# Patient Record
Sex: MALE | Race: BLACK OR AFRICAN AMERICAN | NOT HISPANIC OR LATINO | Employment: OTHER | URBAN - METROPOLITAN AREA
[De-identification: names, ages, dates, MRNs, and addresses within clinical notes are randomized per-mention and may not be internally consistent; named-entity substitution may affect disease eponyms.]

---

## 2017-05-01 ENCOUNTER — OFFICE VISIT (OUTPATIENT)
Dept: NEPHROLOGY | Facility: CLINIC | Age: 42
End: 2017-05-01
Payer: COMMERCIAL

## 2017-05-01 VITALS
HEART RATE: 88 BPM | WEIGHT: 280 LBS | HEIGHT: 74 IN | OXYGEN SATURATION: 98 % | BODY MASS INDEX: 35.94 KG/M2 | SYSTOLIC BLOOD PRESSURE: 150 MMHG | DIASTOLIC BLOOD PRESSURE: 90 MMHG

## 2017-05-01 DIAGNOSIS — I10 ACCELERATED HYPERTENSION: Primary | ICD-10-CM

## 2017-05-01 DIAGNOSIS — E66.01 MORBID OBESITY DUE TO EXCESS CALORIES: ICD-10-CM

## 2017-05-01 PROCEDURE — 3077F SYST BP >= 140 MM HG: CPT | Mod: S$GLB,,, | Performed by: INTERNAL MEDICINE

## 2017-05-01 PROCEDURE — 1160F RVW MEDS BY RX/DR IN RCRD: CPT | Mod: S$GLB,,, | Performed by: INTERNAL MEDICINE

## 2017-05-01 PROCEDURE — 99999 PR PBB SHADOW E&M-EST. PATIENT-LVL III: CPT | Mod: PBBFAC,,, | Performed by: INTERNAL MEDICINE

## 2017-05-01 PROCEDURE — 99204 OFFICE O/P NEW MOD 45 MIN: CPT | Mod: S$GLB,,, | Performed by: INTERNAL MEDICINE

## 2017-05-01 PROCEDURE — 3080F DIAST BP >= 90 MM HG: CPT | Mod: S$GLB,,, | Performed by: INTERNAL MEDICINE

## 2017-05-01 NOTE — Clinical Note
Dinh let kanu know that I will try to enroll     Him in the [facility] Digital BP Monitoring Program.  I would like him to sen to me 2 weeks of BP readings every 6 weeks at least until he is established in this program.

## 2017-05-01 NOTE — LETTER
May 3, 2017      Vero Mercer, NP  [address] [facility] [address]           [facility] - Nephrology  [address]  [place] [address]  Phone: [phone]  Fax: [phone]          Patient: Nicolas Rock   MR Number: [id number]   YOB: 1975   Date of Visit: 5/1/2017       Dear Veor Mercer:    Thank you for referring Nicolas Rock to me for evaluation. Attached you will find relevant portions of my assessment and plan of care.    If you have questions, please do not hesitate to call me. I look forward to following Nicolas Rock along with you.    Sincerely,    Yvette Hyde MD    Enclosure  CC:  No Recipients    If you would like to receive this communication electronically, please contact [email or web address] or [phone] to request more information on [employer]mo Link access.    For providers and/or their staff who would like to refer a patient to [facility], please contact us through our one-stop-shop provider referral line, [facility] , at [phone].    If you feel you have received this communication in error or would no longer like to receive these types of communications, please e-mail [email or web address]

## 2017-05-01 NOTE — PROGRESS NOTES
Subjective:       Patient ID: Nicolas Rock is a 41 y.o. Black or  male who presents for new evaluation of renal function.  HPI  40 yo AAM with morbid obesity IDDM, uncontrolled HTN, serum crt 0.9 and no proteinuria, LVEF 55 with mild diastolic dysfunction and mild MR, here  For evaluation of HTN.  Bp meds include:  on amlodipine,  losartan, labetolol, clonidine, hydralazine and spironolactone.  Renin, aldosterone wnl  2016.     11/2016 renal US: Note that the examination was technically difficult. The kidneys are normal in overall size with the right kidney measuring 12.0 x 6.2 x 5.3 cm in size and the left kidney measuring 12.4 x 6.4 x 5.7 cm. There is no evidence for abnormal renal masses or hydronephrosis. No renal calculi are identified. The urinary bladder appears unremarkable.  The Doppler portion of the examination is markedly suboptimal.     Since hospitalization he has been on more medication and feels that his bp is better controlled.  At home bp 120/80 usally but may drop to 90/60. NO use of NSAIDs,  LUTS, dysuria, SOB, CP.    Review of Systems   Constitutional: Negative for activity change, appetite change, chills, diaphoresis, fatigue, fever and unexpected weight change.   HENT: Negative for congestion, ear discharge, ear pain, facial swelling, hearing loss, nosebleeds, sinus pressure, sore throat and trouble swallowing.    Eyes: Negative for photophobia, pain, discharge, redness, itching and visual disturbance.   Respiratory: Negative for apnea, cough, chest tightness, shortness of breath and wheezing.    Cardiovascular: Negative for chest pain, palpitations and leg swelling.   Gastrointestinal: Negative for abdominal distention, abdominal pain, constipation, diarrhea and vomiting.   Endocrine: Negative for cold intolerance, heat intolerance, polydipsia and polyuria.   Genitourinary: Negative for decreased urine volume, difficulty urinating, dysuria, flank pain, frequency,  "hematuria, scrotal swelling, testicular pain and urgency.   Musculoskeletal: Negative for arthralgias, back pain, gait problem, joint swelling, myalgias, neck pain and neck stiffness.   Skin: Negative for color change, pallor, rash and wound.   Allergic/Immunologic: Negative for environmental allergies and food allergies.   Neurological: Negative for dizziness, tremors, seizures, syncope, facial asymmetry, speech difficulty, weakness, light-headedness, numbness and headaches.   Hematological: Negative for adenopathy. Does not bruise/bleed easily.   Psychiatric/Behavioral: Negative for agitation, behavioral problems, dysphoric mood and sleep disturbance. The patient is not nervous/anxious.        Objective:     Blood pressure (!) 150/90, pulse 88, height 6' 2" (1.88 m), weight 127 kg (280 lb), SpO2 98 %.      Physical Exam   Constitutional: He is oriented to person, place, and time. He appears well-developed and well-nourished. No distress.   HENT:   Head: Normocephalic and atraumatic.   Mouth/Throat: Oropharynx is clear and moist. No oropharyngeal exudate.   Eyes: Conjunctivae and EOM are normal. Pupils are equal, round, and reactive to light. Right eye exhibits no discharge. Left eye exhibits no discharge. No scleral icterus.   Neck: Normal range of motion. Neck supple. No JVD present. No tracheal deviation present. No thyromegaly present.   Cardiovascular: Normal rate, regular rhythm and normal heart sounds.  Exam reveals no gallop and no friction rub.    No murmur heard.  Pulmonary/Chest: No stridor. No respiratory distress. He has no wheezes. He has no rales. He exhibits no tenderness.   Abdominal: Soft. Bowel sounds are normal. He exhibits no distension and no mass. There is no tenderness. There is no rebound and no guarding. No hernia.   Musculoskeletal: Normal range of motion. He exhibits no edema or tenderness.   Lymphadenopathy:     He has no cervical adenopathy.   Neurological: He is alert and oriented to " person, place, and time. No cranial nerve deficit. He exhibits normal muscle tone. Coordination normal.   Skin: Skin is warm and dry. No rash noted. He is not diaphoretic. No erythema. No pallor.   Psychiatric: He has a normal mood and affect. His behavior is normal. Judgment and thought content normal.   Nursing note and vitals reviewed.      Assessment:       1. Accelerated hypertension    2. Morbid obesity due to excess calories        Plan:     42 yo AAM with morbid obesity IDDM, uncontrolled HTN, serum crt 0.9 and no proteinuria, LVEF 55 with mild diastolic dysfunction and mild MR, here  With recent hospitalization for accelerated HTN.    Bp meds include:  on amlodipine,  losartan, labetolol, clonidine, hydralazine and spironolactone.  Renin, aldosterone wnl  2016.normal electrolytes and renal function on chemistries and  normal kidneys on US and now with compliancy and current medical regimen, bps that he claims are well controlled and even low at home.    No further workup at this time.  If Bps continue to be problematic with current regimen would then consider serum and urine catecholamines and metanephrines.  Patient will continue to monitor his bp; instructions on how to monitor provided.  Patient may do well in outpatient BP monitoring program offered through cardiology: Digital BP Monitoring Program with pharmacy surveillance.    No change in meds for now with h/o low bps at home        At this time would continue to monitor

## 2017-05-01 NOTE — PATIENT INSTRUCTIONS
1. Labs:    6 months  2.  Medications:  No change    Add probiotic or fage yogurt  and high fiber diet or metamucil   speak wiith PCP about  Smoking cessation    5. BP:  Take BP and pulse  twice daily for one week, record              Bring results  to next visit.              Goal :   <130/85    6. Diet:        Sodium: < 2000 milligrams daily including all food and drink      (one teaspoon of table salt has 2300 milligrams of sodium)  Mediterranean diet     Vaccines: please check with your PCP and be sure to have an annual flu vaccine and keep up to date with your pneumococcal vaccine for pneumonia      Please avoid or minimize all NSAIDS (ibuprofen, motrin, aleve, indocin, naprosyn, BC powder, mobic, relafen, alleve, and any others) to minimize the risk to your kidneys    Please avoid Proton pump inhibitors unless specifically necessary and speak with your PCP about alternatives such as H2 blockers     Return to clinic:  6 months

## 2017-05-01 NOTE — MR AVS SNAPSHOT
[facility] - Nephrology  [address]  [facility] [address]  Phone: [phone]  Fax: [phone]                  Nicolas MENA Pranav   2017 10:30 AM   Office Visit    Description:  Male : 1975   Provider:  Yvette Hyde MD   Department:  [facility] - Nephrology           Diagnoses this Visit        Comments    Accelerated hypertension    -  Primary     Morbid obesity due to excess calories                To Do List           Future Appointments        Provider Department Dept Phone    5/10/2017 10:30 AM [facility] - Endocrinology [phone]      Goals (5 Years of Data)     None      Follow-Up and Disposition     Return in about 6 months (around 2017).      [facility]     [facility] On Call Nurse Care Line -  Assistance  Unless otherwise directed by your provider, please contact [facility], our nurse care line that is available for  assistance.     Registered nurses in the [facility] On Call Center provide: appointment scheduling, clinical advisement, health education, and other advisory services.  Call: [phone] (toll free)               Medications           Message regarding Medications     Verify the changes and/or additions to your medication regime listed below are the same as discussed with your clinician today.  If any of these changes or additions are incorrect, please notify your healthcare provider.             Verify that the below list of medications is an accurate representation of the medications you are currently taking.  If none reported, the list may be blank. If incorrect, please contact your healthcare provider. Carry this list with you in case of emergency.           Current Medications     amlodipine (NORVASC) 10 MG tablet Take 1 tablet (10 mg total) by mouth once daily.    aspirin 81 MG Chew Take 1 tablet (81 mg total) by mouth once daily.    cloNIDine (CATAPRES) 0.3 MG tablet Take 0.3 mg by mouth 3 (three) times daily.  "   econazole nitrate 1 % cream Daily.    hydrALAZINE (APRESOLINE) 100 MG tablet Take 1 tablet (100 mg total) by mouth every 8 (eight) hours.    ibuprofen (ADVIL,MOTRIN) 800 MG tablet every 4 (four) hours as needed.     insulin glargine (TOUJEO SOLOSTAR) 300 unit/mL (1.5 mL) InPn Inject 40 Units as directed every evening.    insulin regular (NOVOLIN R) 100 unit/mL Inj injection  3 times a day by injection route before meals. ( sliding scale)    labetalol (NORMODYNE) 300 MG tablet Take 1 tablet (300 mg total) by mouth every 12 (twelve) hours.    losartan (COZAAR) 100 MG tablet Take 1 tablet (100 mg total) by mouth once daily.    metformin (GLUCOPHAGE) 500 MG tablet 500 mg 2 (two) times daily with meals.     nicotine (NICODERM CQ) 21 mg/24 hr Place 1 patch onto the skin once daily.    nystatin-triamcinolone (MYCOLOG II) cream Apply topically 2 (two) times daily.    spironolactone (ALDACTONE) 25 MG tablet Take 1 tablet (25 mg total) by mouth once daily.           Clinical Reference Information           Your Vitals Were     BP Pulse Height Weight SpO2 BMI    150/90 88 6' 2" (1.88 m) 127 kg (280 lb) 98% 35.95 kg/m2      Blood Pressure          Most Recent Value    BP  (!)  150/90      Allergies as of 5/1/2017     Lisinopril      Immunizations Administered on Date of Encounter - 5/1/2017     None      [facility] Sign-Up     Activating your [facility] account is as easy as 1-2-3!     1) Visit [email or web address], select Sign Up Now, enter this activation code and your date of birth, then select Next.  [email or web address]  Expires: 5/28/2017 10:00 PM      2) Create a username and password to use when you visit [facility] in the future and select a security question in case you lose your password and select Next.    3) Enter your e-mail address and click Sign Up!    Additional Information  If you have questions, please e-mail [email or web address] or call [phone] to talk to our [facility] staff. Remember, [facility] is NOT to be " used for urgent needs. For medical emergencies, dial 911.         Instructions      1. Labs:    6 months  2.  Medications:  No change    Add probiotic or fage yogurt  and high fiber diet or metamucil   speak wiith PCP about  Smoking cessation    5. BP:  Take BP and pulse  twice daily for one week, record              Bring results  to next visit.              Goal :   <130/85    6. Diet:        Sodium: < 2000 milligrams daily including all food and drink      (one teaspoon of table salt has 2300 milligrams of sodium)  Mediterranean diet     Vaccines: please check with your PCP and be sure to have an annual flu vaccine and keep up to date with your pneumococcal vaccine for pneumonia      Please avoid or minimize all NSAIDS (ibuprofen, motrin, aleve, indocin, naprosyn, BC powder, mobic, relafen, alleve, and any others) to minimize the risk to your kidneys    Please avoid Proton pump inhibitors unless specifically necessary and speak with your PCP about alternatives such as H2 blockers     Return to clinic:  6 months       Smoking Cessation     If you would like to quit smoking:   You may be eligible for free services if you are a [place] resident and started smoking cigarettes before September 1, 1988.  Call the Smoking Cessation Trust ([facility]) toll free at [phone] or [phone].   Call 1-800-QUIT-NOW if you do not meet the above criteria.   Contact us via email: [email or web address]   View our website for more information: [email or web address]        Language Assistance Services     ATTENTION: Language assistance services are available, free of charge. Please call [phone].      ATENCIÓN: Si habla español, tiene a pitts disposición servicios gratuitos de asistencia lingüística. Llame al [phone].     CHÚ Ý: N?u b?n nói Ti?ng Vi?t, có các d?ch v? h? tr? ngôn ng? mi?n phí dành cho b?n. G?i s? [phone].         [facility] complies with applicable Federal civil rights  laws and does not discriminate on the basis of race, color, national origin, age, disability, or sex.

## 2017-05-01 NOTE — Clinical Note
Dr. Rueda, I see that you saw Mr. Rock inpatient last winter. The patient  was sent to me for HTN evaluation at this time. BP appears much better.  I think he would be a good candidate for the Digital BP Monitoring Program.  I am not sure how to initiate this evaluation.  Would you be able to direct me? Regards, Yvette Hyde

## 2017-05-02 ENCOUNTER — TELEPHONE (OUTPATIENT)
Dept: NEPHROLOGY | Facility: CLINIC | Age: 42
End: 2017-05-02

## 2017-10-30 ENCOUNTER — TELEPHONE (OUTPATIENT)
Dept: PAIN MEDICINE | Facility: CLINIC | Age: 42
End: 2017-10-30

## 2017-10-30 NOTE — TELEPHONE ENCOUNTER
"----- Message from Sammy Nuñez sent at 10/30/2017 11:01 AM CDT -----  Contact: self   "Please call me in reference to scheduling an appointment to see the pain management md .  I went to ER, and I was told that I have sciatic nerve pain.  I have not had any radiology report done.  "

## 2017-11-06 ENCOUNTER — TELEPHONE (OUTPATIENT)
Dept: NEUROSURGERY | Facility: CLINIC | Age: 42
End: 2017-11-06

## 2017-11-06 NOTE — TELEPHONE ENCOUNTER
"The pt stated he was not sure if Neurosurgery was the correct path he should be taking for SI pain. I inquired if the medication prescribe for him in the ED is working (mobic, robaxin, and gabapentin. The pt replied with "yes". He stated he will f/u with his PCP first and if he needs to call for another appointment he will do so.    "

## 2018-05-01 ENCOUNTER — HOSPITAL ENCOUNTER (EMERGENCY)
Facility: HOSPITAL | Age: 43
Discharge: HOME | End: 2018-05-01
Payer: COMMERCIAL

## 2018-05-01 VITALS
BODY MASS INDEX: 35.94 KG/M2 | TEMPERATURE: 98 F | RESPIRATION RATE: 16 BRPM | OXYGEN SATURATION: 99 % | SYSTOLIC BLOOD PRESSURE: 160 MMHG | HEIGHT: 74 IN | HEART RATE: 80 BPM | DIASTOLIC BLOOD PRESSURE: 80 MMHG | WEIGHT: 280 LBS

## 2018-05-01 DIAGNOSIS — L02.214 INGUINAL ABSCESS: Primary | ICD-10-CM

## 2018-05-01 PROCEDURE — 25000003 PHARM REV CODE 250: Performed by: NURSE PRACTITIONER

## 2018-05-01 PROCEDURE — 90471 IMMUNIZATION ADMIN: CPT | Performed by: NURSE PRACTITIONER

## 2018-05-01 PROCEDURE — 10061 I&D ABSCESS COMP/MULTIPLE: CPT

## 2018-05-01 PROCEDURE — 63600175 PHARM REV CODE 636 W HCPCS: Performed by: NURSE PRACTITIONER

## 2018-05-01 PROCEDURE — 99283 EMERGENCY DEPT VISIT LOW MDM: CPT | Mod: 25

## 2018-05-01 PROCEDURE — 90715 TDAP VACCINE 7 YRS/> IM: CPT | Performed by: NURSE PRACTITIONER

## 2018-05-01 RX ORDER — SULFAMETHOXAZOLE AND TRIMETHOPRIM 800; 160 MG/1; MG/1
2 TABLET ORAL 2 TIMES DAILY
Qty: 40 TABLET | Refills: 0 | Status: SHIPPED | OUTPATIENT
Start: 2018-05-01 | End: 2018-05-11

## 2018-05-01 RX ORDER — LIDOCAINE HYDROCHLORIDE 10 MG/ML
5 INJECTION INFILTRATION; PERINEURAL
Status: COMPLETED | OUTPATIENT
Start: 2018-05-01 | End: 2018-05-01

## 2018-05-01 RX ORDER — HYDROCODONE BITARTRATE AND ACETAMINOPHEN 7.5; 325 MG/1; MG/1
1 TABLET ORAL EVERY 6 HOURS PRN
Qty: 13 TABLET | Refills: 0 | Status: SHIPPED | OUTPATIENT
Start: 2018-05-01 | End: 2019-05-03

## 2018-05-01 RX ADMIN — CLOSTRIDIUM TETANI TOXOID ANTIGEN (FORMALDEHYDE INACTIVATED), CORYNEBACTERIUM DIPHTHERIAE TOXOID ANTIGEN (FORMALDEHYDE INACTIVATED), BORDETELLA PERTUSSIS TOXOID ANTIGEN (GLUTARALDEHYDE INACTIVATED), BORDETELLA PERTUSSIS FILAMENTOUS HEMAGGLUTININ ANTIGEN (FORMALDEHYDE INACTIVATED), BORDETELLA PERTUSSIS PERTACTIN ANTIGEN, AND BORDETELLA PERTUSSIS FIMBRIAE 2/3 ANTIGEN 0.5 ML: 5; 2; 2.5; 5; 3; 5 INJECTION, SUSPENSION INTRAMUSCULAR at 05:05

## 2018-05-01 RX ADMIN — LIDOCAINE HYDROCHLORIDE 5 ML: 10 INJECTION, SOLUTION INFILTRATION; PERINEURAL at 04:05

## 2018-05-01 NOTE — ED PROVIDER NOTES
New Prescriptions    HYDROCODONE-ACETAMINOPHEN 7.5-325MG (NORCO) 7.5-325 MG PER TABLET    Take 1 tablet by mouth every 6 (six) hours as needed for Pain.    SULFAMETHOXAZOLE-TRIMETHOPRIM 800-160MG (BACTRIM DS) 800-160 MG TAB    Take 2 tablets by mouth 2 (two) times daily.    eMERGENCY dEPARTMENT eNCOUnter    CHIEF COMPLAINT    Chief Complaint   Patient presents with    Abscess     I have abscess in my groin on the right for 7 days and it isn't open.        HPI    Nicolas Rock is a 42 y.o. male who presents to the ED with an abscess to the right groin. States started 7 days ago. Started as a small hard area and now has gotten bigger and is red and feels softer, like something is in it. He denies fever, vomiting, diarrhea. He denies any other lesions. He states he has had abscess before and had it cut open.       CURRENT MEDICATIONS    No current facility-administered medications on file prior to encounter.      Current Outpatient Prescriptions on File Prior to Encounter   Medication Sig Dispense Refill    amlodipine (NORVASC) 10 MG tablet Take 1 tablet (10 mg total) by mouth once daily. 30 tablet 1    cloNIDine (CATAPRES) 0.3 MG tablet Take 0.3 mg by mouth 3 (three) times daily.  0    gabapentin (NEURONTIN) 300 MG capsule Take 1 capsule (300 mg total) by mouth 3 (three) times daily. 60 capsule 0    hydrALAZINE (APRESOLINE) 100 MG tablet Take 1 tablet (100 mg total) by mouth every 8 (eight) hours. 90 tablet 0    insulin glargine (TOUJEO SOLOSTAR) 300 unit/mL (1.5 mL) InPn Inject 40 Units as directed every evening.      insulin regular (NOVOLIN R) 100 unit/mL Inj injection  3 times a day by injection route before meals. ( sliding scale)      labetalol (NORMODYNE) 300 MG tablet Take 1 tablet (300 mg total) by mouth every 12 (twelve) hours. 60 tablet 0    losartan (COZAAR) 100 MG tablet Take 1 tablet (100 mg total) by mouth once daily. 30 tablet 0    meloxicam (MOBIC) 15 MG tablet Take 1 tablet (15 mg  total) by mouth once daily. 14 tablet 0    metformin (GLUCOPHAGE) 500 MG tablet 500 mg 2 (two) times daily with meals.       econazole nitrate 1 % cream Daily.      ibuprofen (ADVIL,MOTRIN) 800 MG tablet every 4 (four) hours as needed.       nicotine (NICODERM CQ) 21 mg/24 hr Place 1 patch onto the skin once daily.  0    nystatin-triamcinolone (MYCOLOG II) cream Apply topically 2 (two) times daily. 30 g 1    [DISCONTINUED] aspirin 81 MG Chew Take 1 tablet (81 mg total) by mouth once daily.  0    [DISCONTINUED] spironolactone (ALDACTONE) 25 MG tablet Take 1 tablet (25 mg total) by mouth once daily. 30 tablet 0         ALLERGIES    Review of patient's allergies indicates:   Allergen Reactions    Lisinopril Hives       PAST MEDICAL HISTORY  Past Medical History:   Diagnosis Date    Chronic diastolic heart failure     Diabetes mellitus     HLD (hyperlipidemia)     Hypertension        SURGICAL HISTORY    Past Surgical History:   Procedure Laterality Date    COLONOSCOPY Left 6/28/2016    Procedure: COLONOSCOPY;  Surgeon: ONEYDA Mckeon MD;  Location: [facility];  Service: Endoscopy;  Laterality: Left;  prostate exam         SOCIAL HISTORY    Social History     Social History    Marital status:      Spouse name: N/A    Number of children: N/A    Years of education: N/A     Social History Main Topics    Smoking status: Current Some Day Smoker     Packs/day: 0.75     Years: 16.00     Types: Cigarettes    Smokeless tobacco: Never Used      Comment:  Currently, chews Nicorette gum.    Alcohol use No    Drug use: No    Sexual activity: Yes     Partners: Female     Other Topics Concern    None     Social History Narrative    None       FAMILY HISTORY    Family History   Problem Relation Age of Onset    Hypertension Mother     Diabetes Mother     Kidney disease Mother     Cancer Maternal Uncle     Heart attack Maternal Grandfather        REVIEW OF SYSTEMS   ROS  Constitutional:  No fever,  "chills, weight loss or weakness.   Eyes:  No  Photophobia, blurred vision or discharge.   HENT:  No ear pain, nasal congestion or sore throat..  Respiratory:  No cough, shortness of breath or wheezing.   Cardiovascular:  No chest pain, palpitations or swelling.   GI:  No abdominal pain, nausea, vomiting, or diarrhea.  : No dysuria, frequency   Musculoskeletal:  No back pain or neck pain.   Skin:  No reported rashes or infected lesions.   Neurologic:  No reported headache, focal weakness or sensory changes.   Endocrine:    Lymphatic:  No reported swollen glands.   Psychiatric:  No reported depression, suicidal ideation or homicidal ideation  All Systems otherwise negative except as noted in the History of Present Illness.        PHYSICAL EXAM    Reviewed Triage Note  VITAL SIGNS: BP (!) 163/89   Pulse 83   Temp 97.9 °F (36.6 °C) (Oral)   Resp 16   Ht 6' 2" (1.88 m)   Wt 127 kg (280 lb)   SpO2 98%   BMI 35.95 kg/m²    Vitals:    05/01/18 1556   BP: (!) 163/89   Pulse: 83   Resp: 16   Temp: 97.9 °F (36.6 °C)       Physical Exam  Nursing Notes and Vital Signs Reviewed  Constitutional:  Well-developed, well-nourished, middle aged male in NAD.  HENT:  Normocephalic, atraumatic. Bilateral external EACs normal. Nose normal, no rhinorrhea. Mouth mucus membranes P & M.   Eyes:  PERRL EOMI. Conjunctiva normal without discharge.   Neck: Normal range of motion. No midline tenderness or vertebral step-off. No stridor. No meningismus. No lymphadenopathy.   Respiratory:  Normal breath sounds bilaterally.  No respiratory distress, retractions, or conversational dyspnea. No wheezing. No rhonchi. No rales.   Cardiovascular:  Normal heart rate. Normal rhythm. No pitting lower extremity edema.   Integument:  Right inguinal abscess with erythema andedema above right testicle. No drainage.   Neurologic:   Alert and Interactive. MAEW. Gait steady.   Psychiatric:  Affect normal. Mood normal.         LABS  Pertinent labs reviewed. " "(See chart for details)           RADIOLOGY    Imaging Results    None         PROCEDURES    I & D - Incision and Drainage  Date/Time: 5/1/2018 5:30 PM  Location procedure was performed: HISTORICAL DEPT - [facility]  Performed by: GUILLERMO DOWD  Authorized by: GUILLERMO DOWD   Assisting provider: ROHAN VILLANUEVA  Pre-operative diagnosis: Inguinal Abscess  Post-operative diagnosis: Inguinal Abscess  Consent Done: Yes  Consent: Verbal consent obtained.  Risks and benefits: risks, benefits and alternatives were discussed  Consent given by: patient  Patient understanding: patient states understanding of the procedure being performed  Patient consent: the patient's understanding of the procedure matches consent given  Procedure consent: procedure consent matches procedure scheduled  Relevant documents: relevant documents present and verified  Site marked: the operative site was marked  Patient identity confirmed: name and verbally with patient  Time out: Immediately prior to procedure a "time out" was called to verify the correct patient, procedure, equipment, support staff and site/side marked as required.  Type: abscess  Body area: trunk  Location details: abdomen    Anesthesia:  Local Anesthetic: lidocaine 1% without epinephrine  Anesthetic total: 5 mL  Patient sedated: no  Risk factor: underlying major vessel  Scalpel size: 11  Incision type: single straight  Complexity: complex  Drainage: pus  Drainage amount: moderate  Wound treatment: deloculation  Packing material: 1/4 in gauze  Technical procedures used: Incision and drainage with packing  Significant surgical tasks conducted by the assistant(s): none  Complications: No  Estimated blood loss (mL): 3  Implants: No  Patient tolerance: Patient tolerated the procedure well with no immediate complications            EKG         ED COURSE & MEDICAL DECISION MAKING    Pertinent & Imaging studies reviewed. (See chart for details and " specific orders.)  I & D with packing. Rx for Bactrim double dose. Patient has had these multiple sites in the past. Advised to return to ED in 2 days for recheck. Sooner if concerns/worsening.    Medications   Tdap vaccine injection 0.5 mL (0.5 mLs Intramuscular Given 5/1/18 1726)   lidocaine HCL 10 mg/ml (1%) injection 5 mL (5 mLs Infiltration Given by Other 5/1/18 1645)           FINAL IMPRESSION    1. Inguinal abscess        Differential Diagnosis: Inguinal Hernia                                       Scrotal/Testicular Mass    Patient advised to follow-up with PCP for re-check and BP re-check.                   Jeimy Pimentel, ADAM  05/01/18 2231

## 2018-07-18 DIAGNOSIS — E11.69 TYPE 2 DIABETES MELLITUS WITH OTHER SPECIFIED COMPLICATION: Primary | ICD-10-CM

## 2018-08-07 ENCOUNTER — HOSPITAL ENCOUNTER (OUTPATIENT)
Dept: RADIOLOGY | Facility: HOSPITAL | Age: 43
Discharge: HOME | End: 2018-08-07
Attending: PODIATRIST
Payer: COMMERCIAL

## 2018-08-07 DIAGNOSIS — E11.69 TYPE 2 DIABETES MELLITUS WITH OTHER SPECIFIED COMPLICATION: ICD-10-CM

## 2018-08-07 PROCEDURE — 73630 X-RAY EXAM OF FOOT: CPT | Mod: 50,TC,FY,PO

## 2019-05-30 ENCOUNTER — PATIENT MESSAGE (OUTPATIENT)
Dept: ADMINISTRATIVE | Facility: OTHER | Age: 44
End: 2019-05-30

## 2019-08-14 ENCOUNTER — HOSPITAL ENCOUNTER (OUTPATIENT)
Dept: RADIOLOGY | Facility: HOSPITAL | Age: 44
Discharge: HOME | End: 2019-08-14
Attending: ANESTHESIOLOGY
Payer: COMMERCIAL

## 2019-08-14 DIAGNOSIS — G89.29 CHRONIC LOW BACK PAIN: ICD-10-CM

## 2019-08-14 DIAGNOSIS — M54.50 CHRONIC LOW BACK PAIN: Primary | ICD-10-CM

## 2019-08-14 DIAGNOSIS — G89.29 CHRONIC LOW BACK PAIN: Primary | ICD-10-CM

## 2019-08-14 DIAGNOSIS — M54.50 CHRONIC LOW BACK PAIN: ICD-10-CM

## 2019-08-14 PROCEDURE — 72110 X-RAY EXAM L-2 SPINE 4/>VWS: CPT | Mod: TC,FY,PO

## 2019-08-21 ENCOUNTER — HOSPITAL ENCOUNTER (OUTPATIENT)
Dept: RADIOLOGY | Facility: HOSPITAL | Age: 44
Discharge: HOME | End: 2019-08-21
Attending: ANESTHESIOLOGY
Payer: COMMERCIAL

## 2019-08-21 DIAGNOSIS — G89.29 CHRONIC LOW BACK PAIN: ICD-10-CM

## 2019-08-21 DIAGNOSIS — M54.50 CHRONIC LOW BACK PAIN: ICD-10-CM

## 2019-08-21 PROCEDURE — 72148 MRI LUMBAR SPINE W/O DYE: CPT | Mod: TC,PO

## 2020-01-31 ENCOUNTER — HOSPITAL ENCOUNTER (EMERGENCY)
Facility: HOSPITAL | Age: 45
Discharge: HOME | End: 2020-01-31
Attending: EMERGENCY MEDICINE
Payer: COMMERCIAL

## 2020-01-31 VITALS
DIASTOLIC BLOOD PRESSURE: 91 MMHG | OXYGEN SATURATION: 97 % | HEIGHT: 74 IN | HEART RATE: 97 BPM | TEMPERATURE: 98 F | WEIGHT: 255 LBS | SYSTOLIC BLOOD PRESSURE: 176 MMHG | RESPIRATION RATE: 16 BRPM | BODY MASS INDEX: 32.73 KG/M2

## 2020-01-31 DIAGNOSIS — I10 HYPERTENSION, UNSPECIFIED TYPE: ICD-10-CM

## 2020-01-31 DIAGNOSIS — L98.8 SKIN MACERATION: Primary | ICD-10-CM

## 2020-01-31 PROCEDURE — S0077 INJECTION, CLINDAMYCIN PHOSP: HCPCS | Mod: ER | Performed by: EMERGENCY MEDICINE

## 2020-01-31 PROCEDURE — 96372 THER/PROPH/DIAG INJ SC/IM: CPT | Mod: ER

## 2020-01-31 PROCEDURE — 99284 EMERGENCY DEPT VISIT MOD MDM: CPT | Mod: 25,ER

## 2020-01-31 PROCEDURE — 25000003 PHARM REV CODE 250: Mod: ER | Performed by: EMERGENCY MEDICINE

## 2020-01-31 RX ORDER — CLINDAMYCIN PHOSPHATE 150 MG/ML
600 INJECTION, SOLUTION INTRAVENOUS
Status: COMPLETED | OUTPATIENT
Start: 2020-01-31 | End: 2020-01-31

## 2020-01-31 RX ORDER — CLINDAMYCIN HYDROCHLORIDE 150 MG/1
300 CAPSULE ORAL 4 TIMES DAILY
Qty: 56 CAPSULE | Refills: 0 | Status: SHIPPED | OUTPATIENT
Start: 2020-01-31 | End: 2020-02-07

## 2020-01-31 RX ORDER — CLONIDINE HYDROCHLORIDE 0.1 MG/1
0.1 TABLET ORAL
Status: COMPLETED | OUTPATIENT
Start: 2020-01-31 | End: 2020-01-31

## 2020-01-31 RX ORDER — CLINDAMYCIN HYDROCHLORIDE 150 MG/1
300 CAPSULE ORAL 4 TIMES DAILY
Qty: 56 CAPSULE | Refills: 0 | Status: SHIPPED | OUTPATIENT
Start: 2020-01-31 | End: 2020-01-31 | Stop reason: SDUPTHER

## 2020-01-31 RX ADMIN — CLONIDINE HYDROCHLORIDE 0.1 MG: 0.1 TABLET ORAL at 09:01

## 2020-01-31 RX ADMIN — CLINDAMYCIN PHOSPHATE 600 MG: 150 INJECTION, SOLUTION INTRAMUSCULAR; INTRAVENOUS at 09:01

## 2020-02-01 NOTE — DISCHARGE INSTRUCTIONS
Keep the area clean and dry.  Your xray does not show bone involvement. Follow-up with your podiatrist in 2 days for wound recheck.  Watch for signs of infection including any worsening redness, pus, warmth, or fever.  If you notice these things please follow up with your primary care physician immediately return to emergency department.  Take medications as prescribed.  Refer to the additional materials provided for further information including when to return to the emergency department.

## 2020-02-01 NOTE — ED TRIAGE NOTES
"Reports to ED c diabetic wound to L foot. States "I have been having this callus on my foot for quite some time now and I see a podiatrist for it. Recently its been moist. And when I work it got wet and since Wednesday it has been draining some white stuff and a little blood." Reports podiatrist sent pt here for possible blood work and abx regimen. No drainage noted. No fever present. Pt diabetic, last BS was 95 this AM  "

## 2020-02-01 NOTE — ED PROVIDER NOTES
Chief complaint:  Left foot wound     HPI    Nicolas Rock 44 y.o. presents to the emergency department today with a complaint of  a left foot long.  Patient reports that he has a callus on his left foot that he is seen regularly with Podiatry for.  Few days ago at work he was out working in the rain, his boot got wet, he had a lot of moisture within the boots.  He noticed that the callus had gotten very soft at that point.  It then progressed becoming open and draining some clear fluid.  Patient denies any warmth or pain at the site.  Denies any drainage of pus.  No fever, chills or sweats at home.    ROS    Constitutional: No fever, no chills.  Eyes: No discharge. No pain.  HENT: No nasal drainage. No ear ache. No sore throat.  Cardiovascular: No chest pain, no palpitations.  Respiratory: No cough, no shortness of breath.  Gastrointestinal: No abdominal pain, no vomiting. No diarrhea.  Genitourinary: No hematuria, dysuria, urgency.  Musculoskeletal: No back pain.   Skin: No rashes, no lesions.  Neurological: No headache, no focal weakness.    Otherwise remaining ROS negative     The history is provided by the patient      ALLERGIES REVIEWED  MEDICATIONS REVIEWED  PMH/PSH/SOC/FH REVIEWED       Past Medical History:   Diagnosis Date    Chronic diastolic heart failure     Diabetes mellitus     HLD (hyperlipidemia)     Hypertension          Past Surgical History:   Procedure Laterality Date    COLONOSCOPY Left 6/28/2016    Procedure: COLONOSCOPY;  Surgeon: ONEYDA Mckeon MD;  Location: [facility];  Service: Endoscopy;  Laterality: Left;  prostate exam           Social History     Tobacco Use    Smoking status: Current Some Day Smoker     Packs/day: 0.75     Years: 16.00     Pack years: 12.00     Types: Cigarettes    Smokeless tobacco: Never Used    Tobacco comment:  Currently, chews Nicorette gum.   Substance Use Topics    Alcohol use: No    Drug use: No       Family History   Problem Relation Age of  "Onset    Hypertension Mother     Diabetes Mother     Kidney disease Mother     Cancer Maternal Uncle     Heart attack Maternal Grandfather        Nursing/Ancillary staff note reviewed.  VS reviewed         Physical Exam     BP (S) (!) 200/95 Comment: pt reports "i forgot to take all blood pressure medicines"  Pulse 98   Temp 98 °F (36.7 °C) (Oral)   Resp 20   Ht 6' 2" (1.88 m)   Wt 115.7 kg (255 lb)   SpO2 96%   BMI 32.74 kg/m²     Physical Exam   Musculoskeletal:        Feet:     There is no warmth or erythema to this area.    General Appearance: The patient is alert, has no immediate need for airway protection and no signs of toxicity. No acute distress. Lying in bed but able to sit up without difficulty.   HEENT: Eyes: Pupils equal and round no pallor or injection. Extra ocular movements intact. No drainage.       Mouth: Mucous membranes are moist. Oropharynx clear.   Neck:Neck is supple non-tender. No lymphadenopathy. No stridor.   Respiratory: There are no retractions, lungs are clear to auscultation. No wheezing, no crackles. Chest wall nontender to palpation.   Cardiovascular: Regular rate and rhythm. No murmurs, rubs or gallops.  Gastrointestinal:  Abdomen is soft and non-tender, no masses, bowel sounds normal. No guarding, no rebound.  No pulsatile mass.   Neurological: Alert and oriented x 4. CN II-XII grossly intact. No focal weakness. Strength intact 5/5 bilaterally in upper and lower extremities.   Skin: Warm and dry, no rashes.   Musculoskeletal:Musculoskeletal: Extremities are non-tender, non-swollen and have full range of motion. Back NTTP along the midline.     DIFFERENTIAL DIAGNOSIS: After history and physical exam a differential diagnosis was considered, but was not limited to, callus, immersion injury, trench foot, cellulitis, abscess         Initial management:  This is a 44-year-old male who presents to the emergency department today with what appears to be an emergent injury. He " has what appears to be macerated area of skin secondary to moisture.  It does not have any purulent drainage at this time.  Will obtain x-ray to in order to evaluate for osteomyelitis.  Treat with antibiotics.          X-Ray Foot Complete Left   Final Result      No radiographic evidence of osteomyelitis.  No foreign body.         Electronically signed by: Cristóbal Ortiz MD   Date:    01/31/2020   Time:    21:05                        ED Course                 Medical Decision Making:   History:   I obtained history from: patient.  Old Medical Records: I decided to obtain old medical records.  Is seen regularly by Podiatry.      Radiological Study: Ordered and Reviewed      ED Management:  Nicolas Rock  presents to the emergency Department today with what appears to be an emergent injury on his left foot.  At this time there is no purulent drainage.  No sign of abscess.  No sign of osteomyelitis on x-ray.  Given the fact the patient has diabetes of place him on antibiotics and I will have him follow up with his podiatrist.  I have discussed with him the need for close follow-up and to monitor the wound for signs of worsening infection.  Patient understands.  He had an elevated blood pressure in the emergency department.  He will go home and take his medications.  He is comfortable with this plan and comfortable going home at this time. The pt is comfortable with this plan and comfortable going home at this time. After taking into careful account the historical factors and physical exam findings of the patient's presentation today, in conjunction with the empirical and objective data obtained on ED workup, no acute emergent medical condition requiring admission has been identified. The patient appears to be low risk for an emergent medical condition and I feel it is safe and appropriate at this time for the patient to be discharged to follow-up as detailed in their discharge instructions for reevaluation and possible  continued outpatient workup and management. Regardless, an unremarkable evaluation in the ED does not preclude the development or presence of a serious or life threatening condition. As such, patient was instructed to return immediately for any worsening or change in current symptoms. Precautions for return discussed at length.  Discharge and follow-up instructions discussed with the patient who expressed understanding and willingness to comply with my recommendations.    Voice recognition software utilized in this note.              Impression      The primary encounter diagnosis was Skin maceration. A diagnosis of Hypertension, unspecified type was also pertinent to this visit.                New Prescriptions    CLINDAMYCIN (CLEOCIN) 150 MG CAPSULE    Take 2 capsules (300 mg total) by mouth 4 (four) times daily. for 7 days                Devin Chávez MD  02/01/20 0313

## 2020-02-01 NOTE — ED NOTES
Pt is ambulatory to and from restroom c NADN or SOB noted. Aware of waiting 15 min to be cautious of any reaction

## 2020-02-06 ENCOUNTER — OFFICE VISIT (OUTPATIENT)
Dept: PODIATRY | Facility: CLINIC | Age: 45
End: 2020-02-06
Payer: COMMERCIAL

## 2020-02-06 VITALS
DIASTOLIC BLOOD PRESSURE: 84 MMHG | SYSTOLIC BLOOD PRESSURE: 120 MMHG | RESPIRATION RATE: 16 BRPM | WEIGHT: 253.81 LBS | HEIGHT: 74 IN | BODY MASS INDEX: 32.57 KG/M2 | HEART RATE: 78 BPM

## 2020-02-06 DIAGNOSIS — E11.621 DIABETIC ULCER OF OTHER PART OF LEFT FOOT ASSOCIATED WITH TYPE 2 DIABETES MELLITUS, WITH FAT LAYER EXPOSED: ICD-10-CM

## 2020-02-06 DIAGNOSIS — E11.49 TYPE II DIABETES MELLITUS WITH NEUROLOGICAL MANIFESTATIONS: ICD-10-CM

## 2020-02-06 DIAGNOSIS — F17.200 SMOKER: ICD-10-CM

## 2020-02-06 DIAGNOSIS — L97.522 DIABETIC ULCER OF OTHER PART OF LEFT FOOT ASSOCIATED WITH TYPE 2 DIABETES MELLITUS, WITH FAT LAYER EXPOSED: ICD-10-CM

## 2020-02-06 PROCEDURE — 3074F PR MOST RECENT SYSTOLIC BLOOD PRESSURE < 130 MM HG: ICD-10-PCS | Mod: CPTII,S$GLB,, | Performed by: PODIATRIST

## 2020-02-06 PROCEDURE — 3008F PR BODY MASS INDEX (BMI) DOCUMENTED: ICD-10-PCS | Mod: CPTII,S$GLB,, | Performed by: PODIATRIST

## 2020-02-06 PROCEDURE — 11042 WOUND DEBRIDEMENT: ICD-10-PCS | Mod: S$GLB,,, | Performed by: PODIATRIST

## 2020-02-06 PROCEDURE — 99204 OFFICE O/P NEW MOD 45 MIN: CPT | Mod: 25,S$GLB,, | Performed by: PODIATRIST

## 2020-02-06 PROCEDURE — 99204 PR OFFICE/OUTPT VISIT, NEW, LEVL IV, 45-59 MIN: ICD-10-PCS | Mod: 25,S$GLB,, | Performed by: PODIATRIST

## 2020-02-06 PROCEDURE — 3079F DIAST BP 80-89 MM HG: CPT | Mod: CPTII,S$GLB,, | Performed by: PODIATRIST

## 2020-02-06 PROCEDURE — 99999 PR PBB SHADOW E&M-EST. PATIENT-LVL IV: ICD-10-PCS | Mod: PBBFAC,,, | Performed by: PODIATRIST

## 2020-02-06 PROCEDURE — 11042 DBRDMT SUBQ TIS 1ST 20SQCM/<: CPT | Mod: S$GLB,,, | Performed by: PODIATRIST

## 2020-02-06 PROCEDURE — 99999 PR PBB SHADOW E&M-EST. PATIENT-LVL IV: CPT | Mod: PBBFAC,,, | Performed by: PODIATRIST

## 2020-02-06 PROCEDURE — 3074F SYST BP LT 130 MM HG: CPT | Mod: CPTII,S$GLB,, | Performed by: PODIATRIST

## 2020-02-06 PROCEDURE — 3008F BODY MASS INDEX DOCD: CPT | Mod: CPTII,S$GLB,, | Performed by: PODIATRIST

## 2020-02-06 PROCEDURE — 3079F PR MOST RECENT DIASTOLIC BLOOD PRESSURE 80-89 MM HG: ICD-10-PCS | Mod: CPTII,S$GLB,, | Performed by: PODIATRIST

## 2020-02-06 RX ORDER — OXYCODONE AND ACETAMINOPHEN 10; 325 MG/1; MG/1
1 TABLET ORAL 2 TIMES DAILY PRN
Status: ON HOLD | COMMUNITY
Start: 2020-01-30 | End: 2020-09-03

## 2020-02-06 RX ORDER — ATORVASTATIN CALCIUM 40 MG/1
TABLET, FILM COATED ORAL
COMMUNITY
End: 2021-01-01

## 2020-02-06 RX ORDER — GABAPENTIN 300 MG/1
300 CAPSULE ORAL 2 TIMES DAILY
COMMUNITY

## 2020-02-06 NOTE — LETTER
February 6, 2020      Eveline Hensley DPM  [address]  [place] [address]             [address]  [place] [address]  Phone: [phone]  Fax: [phone]          Patient: Nicolas Rock   MR Number: [id number]   YOB: 1975   Date of Visit: 2/6/2020       Dear Dr. Eveline Hensley:    Thank you for referring Nicolas Rock to me for evaluation. Attached you will find relevant portions of my assessment and plan of care.    If you have questions, please do not hesitate to call me. I look forward to following Nicolas Rock along with you.    Sincerely,    Lacie Ibanez DPM    Enclosure  CC:  No Recipients    If you would like to receive this communication electronically, please contact [email or web address] or [phone] to request more information on [facility] access.    For providers and/or their staff who would like to refer a patient to [facility], please contact us through our one-stop-shop provider referral line, [facility], at [phone].    If you feel you have received this communication in error or would no longer like to receive these types of communications, please e-mail [email or web address]

## 2020-02-06 NOTE — PROGRESS NOTES
Subjective:      Patient ID: Nicolas Rock is a 44 y.o. male.    Chief Complaint: Foot Ulcer (left foot. discharge); Callouses (right foot); and PCP visit (does not have a set primary)    44 y.o. male presenting with left foot diabetic ulcer.  Patient has been under care of Dr. Hensley.  Patient points sub met 2 of the left foot. Patient tells me has been formation of callus 4 years.  Callus for them developed ulcer sometime last week.  Ambulating in [place] shoe.  Last glucose check was 192.  He smokes half pack a day. Patient was sent to emergency room.  X-rays were taken which was negative for infection.  Patient was given 10 days of clindamycin.  Patient admits taking antibiotics.  Denies pain.  Complains of numbness and tingling.        Review of Systems   Constitution: Negative for chills, decreased appetite, fever and malaise/fatigue.   HENT: Negative for congestion, ear discharge and sore throat.    Eyes: Negative for discharge and pain.   Cardiovascular: Negative for chest pain, claudication and leg swelling.   Respiratory: Negative for cough and shortness of breath.    Skin: Positive for color change and poor wound healing. Negative for nail changes and rash.   Musculoskeletal: Positive for stiffness. Negative for arthritis, joint pain, joint swelling and muscle weakness.   Gastrointestinal: Negative for bloating, abdominal pain, diarrhea, nausea and vomiting.   Genitourinary: Negative for flank pain and hematuria.   Neurological: Positive for numbness and sensory change. Negative for headaches and weakness.   Psychiatric/Behavioral: Negative for altered mental status.             Past Medical History:   Diagnosis Date    Chronic diastolic heart failure     Diabetes mellitus     HLD (hyperlipidemia)     Hypertension        Past Surgical History:   Procedure Laterality Date    COLONOSCOPY Left 6/28/2016    Procedure: COLONOSCOPY;  Surgeon: ONEYDA Mckeon MD;  Location: [facility];  Service:  Endoscopy;  Laterality: Left;  prostate exam         Family History   Problem Relation Age of Onset    Hypertension Mother     Diabetes Mother     Kidney disease Mother     Cancer Maternal Uncle     Heart attack Maternal Grandfather        Social History     Socioeconomic History    Marital status:      Spouse name: Not on file    Number of children: Not on file    Years of education: Not on file    Highest education level: Not on file   Occupational History    Not on file   Social Needs    Financial resource strain: Not on file    Food insecurity:     Worry: Not on file     Inability: Not on file    Transportation needs:     Medical: Not on file     Non-medical: Not on file   Tobacco Use    Smoking status: Current Some Day Smoker     Packs/day: 0.75     Years: 16.00     Pack years: 12.00     Types: Cigarettes    Smokeless tobacco: Never Used    Tobacco comment:  Currently, chews Nicorette gum.   Substance and Sexual Activity    Alcohol use: No    Drug use: No    Sexual activity: Yes     Partners: Female   Lifestyle    Physical activity:     Days per week: Not on file     Minutes per session: Not on file    Stress: Not on file   Relationships    Social connections:     Talks on phone: Not on file     Gets together: Not on file     Attends Hindu service: Not on file     Active member of club or organization: Not on file     Attends meetings of clubs or organizations: Not on file     Relationship status: Not on file   Other Topics Concern    Not on file   Social History Narrative    Not on file       Current Outpatient Medications   Medication Sig Dispense Refill    atorvastatin (LIPITOR) 40 MG tablet atorvastatin 40 mg tablet      clindamycin (CLEOCIN) 150 MG capsule Take 2 capsules (300 mg total) by mouth 4 (four) times daily. for 7 days 56 capsule 0    cloNIDine (CATAPRES) 0.3 MG tablet Take 0.3 mg by mouth 3 (three) times daily.  0    gabapentin (NEURONTIN) 300 MG capsule  "gabapentin 300 mg capsule      hydrALAZINE (APRESOLINE) 100 MG tablet Take 1 tablet (100 mg total) by mouth every 8 (eight) hours. 90 tablet 0    ibuprofen (ADVIL,MOTRIN) 800 MG tablet every 4 (four) hours as needed.       insulin glargine (TOUJEO SOLOSTAR) 300 unit/mL (1.5 mL) InPn Inject 40 Units as directed every evening.      insulin regular (NOVOLIN R) 100 unit/mL Inj injection  3 times a day by injection route before meals. ( sliding scale)      oxyCODONE-acetaminophen (PERCOCET)  mg per tablet Take 1 tablet by mouth 2 (two) times daily as needed.      amlodipine (NORVASC) 10 MG tablet Take 1 tablet (10 mg total) by mouth once daily. 30 tablet 1    HYDROcodone-acetaminophen (NORCO) 5-325 mg per tablet Take 1 tablet by mouth every 4 (four) hours as needed for Pain. (Patient not taking: Reported on 2/6/2020) 10 tablet 0    labetalol (NORMODYNE) 300 MG tablet Take 1 tablet (300 mg total) by mouth every 12 (twelve) hours. 60 tablet 0    losartan (COZAAR) 100 MG tablet Take 1 tablet (100 mg total) by mouth once daily. 30 tablet 0    meloxicam (MOBIC) 15 MG tablet Take 1 tablet (15 mg total) by mouth once daily. 14 tablet 0     No current facility-administered medications for this visit.        Review of patient's allergies indicates:   Allergen Reactions    Lisinopril Hives       Vitals:    02/06/20 1438   BP: 120/84   Pulse: 78   Resp: 16   Weight: 115.1 kg (253 lb 12.8 oz)   Height: 6' 2" (1.88 m)   PainSc: 0-No pain       Objective:      Physical Exam   Constitutional: He is oriented to person, place, and time. He appears well-developed and well-nourished. No distress.   HENT:   Nose: Nose normal.   Eyes: Conjunctivae are normal.   Neck: Normal range of motion.   Pulmonary/Chest: Effort normal. He exhibits no tenderness.   Abdominal: There is no tenderness.   Neurological: He is alert and oriented to person, place, and time.   Psychiatric: He has a normal mood and affect. His behavior is normal. "       Vascular: Distal DP/PT pulses palpable 1/4. CRT < 3 sec to tips of toes. No vericosities noted to LEs. warm to touch LE, No edema noted to LE.    Dermatologic:   Left foot:  4 cm x 5 cm by 0.5 cm wound on the plantar aspect of the sub met 2 with blister formation filled with serous drainage with some epidermolysis.  Mild rubor, no erythema, no pus, no crepitus, mild malodor, interdigital maceration.  No probes to bone/no probe to deep tissue.     R foot:  Hyperkeratotic lesion distal tip of hallux, sub met 4.     Scaling dryness in a moccasin distribution is noted to the bilateral lower extremities with associated erythema.    Musculoskeletal: MMT 5/5 in DF/PF/Inv/Ev resistance with no reproduction of pain in any direction. Passive range of motion of ankle and pedal joints is painless. No calf tenderness LE, Compartments soft/compressible.    Neurological: Light touch, proprioception, and sharp/dull sensation are all intact. Protective threshold with the Bellwood-Wienstein monofilament is intact. Vibratory sensation intact.     2/6/20            Assessment:       Encounter Diagnoses   Name Primary?    IDDM (insulin dependent diabetes mellitus) Yes    Type II diabetes mellitus with neurological manifestations     Diabetic ulcer of other part of left foot associated with type 2 diabetes mellitus, with fat layer exposed     Smoker          Plan:       Nicolas was seen today for foot ulcer, callouses and pcp visit.    Diagnoses and all orders for this visit:    IDDM (insulin dependent diabetes mellitus)    Type II diabetes mellitus with neurological manifestations    Diabetic ulcer of other part of left foot associated with type 2 diabetes mellitus, with fat layer exposed    Smoker      I counseled the patient on his conditions, their implications and medical management.    44 y.o. male with left foot diabetic ulcer status post wound debridement.     -status post left foot wound debridement.  Verbal consent was  obtained.  See procedure note.  Patient tolerated well.  -Applied saline moist and Silva to wound site followed by Aquacel foam 2 layers of cast padding to the foot secured lightly with Coban.  -WBAt in sx shoe. Sx shoe dispensed.  -Shoe inspection. General Foot Education. Patient reminded of the importance of good nutrition. Patient instructed on proper foot hygeine. We discussed wearing proper shoe gear, daily foot inspections, never walking without protective shoe gear, caution putting sharp instruments to feet. Discussed general foot care:  Wear comfortable, proper fitting shoes. Wash feet daily. Dry well. After drying, apply moisturizer to feet (no lotion to webspaces). Inspect feet daily for skin breaks, blisters, swelling, or redness. Wear cotton socks (preferably white)  Change socks every day. Do NOT walk barefoot. Do NOT use heating pads or warm/hot water soaks   -It was discussed the importance of wearing shoes with adequate room in toe box to accommodate toe deformities. Recommended New Balance/Asics shoe brands with adequate arch supports to alleviate abnormal pressure and improve stability of foot while walking. Avoid flat shoes and barefoot walking as these will exacerbate or worsen symptoms.   -Discuss in detail the harmful effects of nicotine/tobacco/cigarette smoking, especially in relation to the lower extremity. Recommend consultation with primary care provider for further discussed of smoking cessation methods. Smoking & Tobacco use cessation couseling was rendered at today's visit; intermediate, bewteen 3 and 10 minutes.  -f/u 1 week.       Note dictated with voice recognition software, please excuse any grammatical errors.

## 2020-02-06 NOTE — PROCEDURES
"Wound Debridement  Date/Time: 2/6/2020 1:45 PM  Performed by: Lacie Ibanez DPM  Authorized by: Lacie Ibanez DPM     Time out: Immediately prior to procedure a "time out" was called to verify the correct patient, procedure, equipment, support staff and site/side marked as required.    Consent Done?:  Yes (Verbal)    Preparation: Patient was prepped and draped in usual sterile fashion    Local anesthesia used?: No      Wound Details:    Location:  Left foot    Location:  Left Plantar    Type of Debridement:  Excisional       Length (cm):  4       Area (sq cm):  20       Width (cm):  5       Percent Debrided (%):  1       Depth (cm):  100       Total Area Debrided (sq cm):  0.2    Depth of debridement:  Subcutaneous tissue    Tissue debrided:  Subcutaneous    Devitalized tissue debrided:  Biofilm and Callus    Instruments:  Curette and Blade    Bleeding:  Minimal  Hemostasis Achieved: Yes    Method Used:  Pressure  Patient tolerance:  Patient tolerated the procedure well with no immediate complications     Time-out was performed with the patient the room      "

## 2020-02-17 ENCOUNTER — TELEPHONE (OUTPATIENT)
Dept: PODIATRY | Facility: CLINIC | Age: 45
End: 2020-02-17

## 2020-02-17 NOTE — TELEPHONE ENCOUNTER
----- Message from Starr Phillips sent at 2/17/2020  3:47 PM CST -----  Contact: [phone]/self  Patient calling to inform you that he is running late for his appointment. About 15 - 20 minutes. Please advise.

## 2020-02-19 ENCOUNTER — PATIENT MESSAGE (OUTPATIENT)
Dept: PODIATRY | Facility: CLINIC | Age: 45
End: 2020-02-19

## 2020-02-19 ENCOUNTER — OFFICE VISIT (OUTPATIENT)
Dept: PODIATRY | Facility: CLINIC | Age: 45
End: 2020-02-19
Payer: COMMERCIAL

## 2020-02-19 VITALS
WEIGHT: 251.88 LBS | HEART RATE: 94 BPM | RESPIRATION RATE: 16 BRPM | DIASTOLIC BLOOD PRESSURE: 84 MMHG | BODY MASS INDEX: 32.33 KG/M2 | HEIGHT: 74 IN | SYSTOLIC BLOOD PRESSURE: 135 MMHG

## 2020-02-19 DIAGNOSIS — E11.621 DIABETIC ULCER OF OTHER PART OF LEFT FOOT ASSOCIATED WITH TYPE 2 DIABETES MELLITUS, WITH FAT LAYER EXPOSED: Primary | ICD-10-CM

## 2020-02-19 DIAGNOSIS — E11.49 TYPE II DIABETES MELLITUS WITH NEUROLOGICAL MANIFESTATIONS: ICD-10-CM

## 2020-02-19 DIAGNOSIS — L84 CORN OR CALLUS: ICD-10-CM

## 2020-02-19 DIAGNOSIS — L97.522 DIABETIC ULCER OF OTHER PART OF LEFT FOOT ASSOCIATED WITH TYPE 2 DIABETES MELLITUS, WITH FAT LAYER EXPOSED: Primary | ICD-10-CM

## 2020-02-19 PROCEDURE — 99999 PR PBB SHADOW E&M-EST. PATIENT-LVL IV: ICD-10-PCS | Mod: PBBFAC,,, | Performed by: PODIATRIST

## 2020-02-19 PROCEDURE — 99213 PR OFFICE/OUTPT VISIT, EST, LEVL III, 20-29 MIN: ICD-10-PCS | Mod: S$GLB,,, | Performed by: PODIATRIST

## 2020-02-19 PROCEDURE — 99213 OFFICE O/P EST LOW 20 MIN: CPT | Mod: S$GLB,,, | Performed by: PODIATRIST

## 2020-02-19 PROCEDURE — 3079F PR MOST RECENT DIASTOLIC BLOOD PRESSURE 80-89 MM HG: ICD-10-PCS | Mod: CPTII,S$GLB,, | Performed by: PODIATRIST

## 2020-02-19 PROCEDURE — 3008F BODY MASS INDEX DOCD: CPT | Mod: CPTII,S$GLB,, | Performed by: PODIATRIST

## 2020-02-19 PROCEDURE — 3079F DIAST BP 80-89 MM HG: CPT | Mod: CPTII,S$GLB,, | Performed by: PODIATRIST

## 2020-02-19 PROCEDURE — 99999 PR PBB SHADOW E&M-EST. PATIENT-LVL IV: CPT | Mod: PBBFAC,,, | Performed by: PODIATRIST

## 2020-02-19 PROCEDURE — 3075F SYST BP GE 130 - 139MM HG: CPT | Mod: CPTII,S$GLB,, | Performed by: PODIATRIST

## 2020-02-19 PROCEDURE — 3075F PR MOST RECENT SYSTOLIC BLOOD PRESS GE 130-139MM HG: ICD-10-PCS | Mod: CPTII,S$GLB,, | Performed by: PODIATRIST

## 2020-02-19 PROCEDURE — 3008F PR BODY MASS INDEX (BMI) DOCUMENTED: ICD-10-PCS | Mod: CPTII,S$GLB,, | Performed by: PODIATRIST

## 2020-02-19 NOTE — PROGRESS NOTES
Subjective:      Patient ID: Nicolas Rock is a 44 y.o. male.    Chief Complaint: football dressing (left foot)    44 y.o. male presenting with left foot diabetic ulcer.  Patient has been under care of Dr. Hensley.  Patient points sub met 2 of the left foot. Patient tells me has been formation of callus 4 years.  Callus for them developed ulcer sometime last week.  Ambulating in [place] shoe.  Last glucose check was 192.  He smokes half pack a day. Patient was sent to emergency room.  X-rays were taken which was negative for infection.  Patient was given 10 days of clindamycin.  Patient admits taking antibiotics.  Denies pain.  Complains of numbness and tingling.      2/19/20 f/u for left foot DM ulcer. Has been in football dressing in sx shoe. Denies pain. Last glucose measured was 189. Requesting DM shoe.       Hemoglobin A1C   Date Value Ref Range Status   11/28/2016 12.0 (H) 4.5 - 6.2 % Final     Comment:     According to ADA guidelines, hemoglobin A1C <7.0% represents  optimal control in non-pregnant diabetic patients.  Different  metrics may apply to specific populations.   Standards of Medical Care in Diabetes - 2016.  For the purpose of screening for the presence of diabetes:  <5.7%     Consistent with the absence of diabetes  5.7-6.4%  Consistent with increasing risk for diabetes   (prediabetes)  >or=6.5%  Consistent with diabetes  Currently no consensus exists for use of hemoglobin A1C  for diagnosis of diabetes for children.     04/04/2014 9.3 (H) 4.5 - 6.2 % Final       Review of Systems   Constitution: Negative for chills, decreased appetite, fever and malaise/fatigue.   HENT: Negative for congestion, ear discharge and sore throat.    Eyes: Negative for discharge and pain.   Cardiovascular: Negative for chest pain, claudication and leg swelling.   Respiratory: Negative for cough and shortness of breath.    Skin: Positive for color change. Negative for nail changes, poor wound healing and rash.    Musculoskeletal: Positive for stiffness. Negative for arthritis, joint pain, joint swelling and muscle weakness.   Gastrointestinal: Negative for bloating, abdominal pain, diarrhea, nausea and vomiting.   Genitourinary: Negative for flank pain and hematuria.   Neurological: Positive for numbness and sensory change. Negative for headaches and weakness.   Psychiatric/Behavioral: Negative for altered mental status.             Past Medical History:   Diagnosis Date    Chronic diastolic heart failure     Diabetes mellitus     HLD (hyperlipidemia)     Hypertension        Past Surgical History:   Procedure Laterality Date    COLONOSCOPY Left 6/28/2016    Procedure: COLONOSCOPY;  Surgeon: ONEYDA Mckeon MD;  Location: [facility];  Service: Endoscopy;  Laterality: Left;  prostate exam         Family History   Problem Relation Age of Onset    Hypertension Mother     Diabetes Mother     Kidney disease Mother     Cancer Maternal Uncle     Heart attack Maternal Grandfather        Social History     Socioeconomic History    Marital status:      Spouse name: Not on file    Number of children: Not on file    Years of education: Not on file    Highest education level: Not on file   Occupational History    Not on file   Social Needs    Financial resource strain: Not on file    Food insecurity:     Worry: Not on file     Inability: Not on file    Transportation needs:     Medical: Not on file     Non-medical: Not on file   Tobacco Use    Smoking status: Current Some Day Smoker     Packs/day: 0.75     Years: 16.00     Pack years: 12.00     Types: Cigarettes    Smokeless tobacco: Never Used    Tobacco comment:  Currently, chews Nicorette gum.   Substance and Sexual Activity    Alcohol use: No    Drug use: No    Sexual activity: Yes     Partners: Female   Lifestyle    Physical activity:     Days per week: Not on file     Minutes per session: Not on file    Stress: Not on file   Relationships     Social connections:     Talks on phone: Not on file     Gets together: Not on file     Attends [employer] service: Not on file     Active member of club or organization: Not on file     Attends meetings of clubs or organizations: Not on file     Relationship status: Not on file   Other Topics Concern    Not on file   Social History Narrative    Not on file       Current Outpatient Medications   Medication Sig Dispense Refill    atorvastatin (LIPITOR) 40 MG tablet atorvastatin 40 mg tablet      cloNIDine (CATAPRES) 0.3 MG tablet Take 0.3 mg by mouth 3 (three) times daily.  0    gabapentin (NEURONTIN) 300 MG capsule gabapentin 300 mg capsule      hydrALAZINE (APRESOLINE) 100 MG tablet Take 1 tablet (100 mg total) by mouth every 8 (eight) hours. 90 tablet 0    ibuprofen (ADVIL,MOTRIN) 800 MG tablet every 4 (four) hours as needed.       insulin glargine (TOUJEO SOLOSTAR) 300 unit/mL (1.5 mL) InPn Inject 40 Units as directed every evening.      insulin regular (NOVOLIN R) 100 unit/mL Inj injection  3 times a day by injection route before meals. ( sliding scale)      meloxicam (MOBIC) 15 MG tablet Take 1 tablet (15 mg total) by mouth once daily. 14 tablet 0    oxyCODONE-acetaminophen (PERCOCET)  mg per tablet Take 1 tablet by mouth 2 (two) times daily as needed.      amlodipine (NORVASC) 10 MG tablet Take 1 tablet (10 mg total) by mouth once daily. 30 tablet 1    HYDROcodone-acetaminophen (NORCO) 5-325 mg per tablet Take 1 tablet by mouth every 4 (four) hours as needed for Pain. (Patient not taking: Reported on 2/6/2020) 10 tablet 0    labetalol (NORMODYNE) 300 MG tablet Take 1 tablet (300 mg total) by mouth every 12 (twelve) hours. 60 tablet 0    losartan (COZAAR) 100 MG tablet Take 1 tablet (100 mg total) by mouth once daily. 30 tablet 0     No current facility-administered medications for this visit.        Review of patient's allergies indicates:   Allergen Reactions    Lisinoprmalvin Cuevaes  "      Vitals:    02/19/20 1509   BP: 135/84   Pulse: 94   Resp: 16   Weight: 114.3 kg (251 lb 14.4 oz)   Height: 6' 2" (1.88 m)   PainSc: 0-No pain       Objective:      Physical Exam   Constitutional: He is oriented to person, place, and time. He appears well-developed and well-nourished. No distress.   HENT:   Nose: Nose normal.   Eyes: Conjunctivae are normal.   Neck: Normal range of motion.   Pulmonary/Chest: Effort normal. He exhibits no tenderness.   Abdominal: There is no tenderness.   Neurological: He is alert and oriented to person, place, and time.   Psychiatric: He has a normal mood and affect. His behavior is normal.       Vascular: Distal DP/PT pulses palpable 1/4. CRT < 3 sec to tips of toes. No vericosities noted to LEs. warm to touch LE, No edema noted to LE.    Dermatologic:   Left foot: completely closed wound with periwound hyperkeratotic lesion plantarly. No open wound.     R foot:  Hyperkeratotic lesion distal tip of hallux, sub met 4.     Scaling dryness in a moccasin distribution is noted to the bilateral lower extremities with associated erythema.    Musculoskeletal: MMT 5/5 in DF/PF/Inv/Ev resistance with no reproduction of pain in any direction. Passive range of motion of ankle and pedal joints is painless. No calf tenderness LE, Compartments soft/compressible.    Neurological: Light touch, proprioception, and sharp/dull sensation are all intact. Protective threshold with the [place]-Wienstein monofilament is intact. Vibratory sensation intact.     2/6/20            Assessment:       Encounter Diagnoses   Name Primary?    Type II diabetes mellitus with neurological manifestations     Corn or callus     Diabetic ulcer of other part of left foot associated with type 2 diabetes mellitus, with fat layer exposed Yes         Plan:       Nicolas was seen today for football dressing.    Diagnoses and all orders for this visit:    Diabetic ulcer of other part of left foot associated with type 2 " diabetes mellitus, with fat layer exposed    Type II diabetes mellitus with neurological manifestations  -     DIABETIC SHOES FOR HOME USE    Corn or callus      I counseled the patient on his conditions, their implications and medical management.    44 y.o. male with left foot diabetic ulcer status post wound debridement.     -completely healed of left foot DM ulcer. Recommend DM shoe with proper offloading.  -rx. DM shoe  -pt would like to follow up with Dr. Hensley.  -The nature of the condition, options for management, as well as potential risks and complications were discussed in detail with patient. Patient was amenable to my recommendations and left my office fully informed and will follow up as instructed or sooner if necessary.    -Patient was advised of signs and symptoms of infection including redness, drainage, purulence, odor, streaking, fever, chills and I advised patient to seek medical attention (ER or urgent care) if these symptoms arise.   -Advised for optimal glucose control and maintenance per primary care physician. Patient was also educated on healthy diet that is naturally rich in nutrients and low in fat and calories.   -f/u prn       Note dictated with voice recognition software, please excuse any grammatical errors.

## 2020-03-16 ENCOUNTER — OFFICE VISIT (OUTPATIENT)
Dept: PODIATRY | Facility: CLINIC | Age: 45
End: 2020-03-16
Payer: COMMERCIAL

## 2020-03-16 VITALS
BODY MASS INDEX: 32.61 KG/M2 | HEIGHT: 74 IN | HEART RATE: 82 BPM | RESPIRATION RATE: 16 BRPM | SYSTOLIC BLOOD PRESSURE: 140 MMHG | WEIGHT: 254.13 LBS | DIASTOLIC BLOOD PRESSURE: 92 MMHG

## 2020-03-16 DIAGNOSIS — Z87.2 HISTORY OF ULCER OF LOWER LIMB: ICD-10-CM

## 2020-03-16 DIAGNOSIS — E11.49 TYPE II DIABETES MELLITUS WITH NEUROLOGICAL MANIFESTATIONS: ICD-10-CM

## 2020-03-16 DIAGNOSIS — L84 CORN OR CALLUS: Primary | ICD-10-CM

## 2020-03-16 PROBLEM — E11.621 DIABETIC ULCER OF LEFT FOOT ASSOCIATED WITH TYPE 2 DIABETES MELLITUS, WITH FAT LAYER EXPOSED: Status: RESOLVED | Noted: 2020-02-06 | Resolved: 2020-03-16

## 2020-03-16 PROBLEM — L97.522 DIABETIC ULCER OF LEFT FOOT ASSOCIATED WITH TYPE 2 DIABETES MELLITUS, WITH FAT LAYER EXPOSED: Status: RESOLVED | Noted: 2020-02-06 | Resolved: 2020-03-16

## 2020-03-16 PROCEDURE — 3080F PR MOST RECENT DIASTOLIC BLOOD PRESSURE >= 90 MM HG: ICD-10-PCS | Mod: CPTII,S$GLB,, | Performed by: PODIATRIST

## 2020-03-16 PROCEDURE — 99999 PR PBB SHADOW E&M-EST. PATIENT-LVL III: CPT | Mod: PBBFAC,,, | Performed by: PODIATRIST

## 2020-03-16 PROCEDURE — 99213 PR OFFICE/OUTPT VISIT, EST, LEVL III, 20-29 MIN: ICD-10-PCS | Mod: S$GLB,,, | Performed by: PODIATRIST

## 2020-03-16 PROCEDURE — 99213 OFFICE O/P EST LOW 20 MIN: CPT | Mod: S$GLB,,, | Performed by: PODIATRIST

## 2020-03-16 PROCEDURE — 3080F DIAST BP >= 90 MM HG: CPT | Mod: CPTII,S$GLB,, | Performed by: PODIATRIST

## 2020-03-16 PROCEDURE — 3008F BODY MASS INDEX DOCD: CPT | Mod: CPTII,S$GLB,, | Performed by: PODIATRIST

## 2020-03-16 PROCEDURE — 3077F SYST BP >= 140 MM HG: CPT | Mod: CPTII,S$GLB,, | Performed by: PODIATRIST

## 2020-03-16 PROCEDURE — 3008F PR BODY MASS INDEX (BMI) DOCUMENTED: ICD-10-PCS | Mod: CPTII,S$GLB,, | Performed by: PODIATRIST

## 2020-03-16 PROCEDURE — 99999 PR PBB SHADOW E&M-EST. PATIENT-LVL III: ICD-10-PCS | Mod: PBBFAC,,, | Performed by: PODIATRIST

## 2020-03-16 PROCEDURE — 3077F PR MOST RECENT SYSTOLIC BLOOD PRESSURE >= 140 MM HG: ICD-10-PCS | Mod: CPTII,S$GLB,, | Performed by: PODIATRIST

## 2020-03-16 NOTE — PROGRESS NOTES
Subjective:      Patient ID: Nicolas Rock is a 44 y.o. male.    Chief Complaint: Follow-up (left foot) and Foot Ulcer (left foot)    44 y.o. male presenting with left foot diabetic ulcer.  Patient has been under care of Dr. Hensley.  Patient points sub met 2 of the left foot. Patient tells me has been formation of callus 4 years.  Callus for them developed ulcer sometime last week.  Ambulating in [place] shoe.  Last glucose check was 192.  He smokes half pack a day. Patient was sent to emergency room.  X-rays were taken which was negative for infection.  Patient was given 10 days of clindamycin.  Patient admits taking antibiotics.  Denies pain.  Complains of numbness and tingling.      2/19/20 f/u for left foot DM ulcer. Has been in football dressing in sx shoe. Denies pain. Last glucose measured was 189. Requesting DM shoe.     3/16/20 f/u for surgical consultation from Dr. Hensley. Pt has been evaluated by Dr. Hensley who has been debriding plantar wounds. Pt was seen by Dr. Hensley last Friday. Pt denies having re occurrence of the ulcer of left foot since pt was seen by me. Ambulating in [place] shoe. Last measured sugar was 154. I ordered DM shoe last time which he did not obtain. Complains of pain at submet2/3 of left foot and painful callus distal tip of hallux.         Hemoglobin A1C   Date Value Ref Range Status   11/28/2016 12.0 (H) 4.5 - 6.2 % Final     Comment:     According to ADA guidelines, hemoglobin A1C <7.0% represents  optimal control in non-pregnant diabetic patients.  Different  metrics may apply to specific populations.   Standards of Medical Care in Diabetes - 2016.  For the purpose of screening for the presence of diabetes:  <5.7%     Consistent with the absence of diabetes  5.7-6.4%  Consistent with increasing risk for diabetes   (prediabetes)  >or=6.5%  Consistent with diabetes  Currently no consensus exists for use of hemoglobin A1C  for diagnosis of diabetes for children.     04/04/2014 9.3 (H)  4.5 - 6.2 % Final       Review of Systems   Constitution: Negative for chills, decreased appetite, fever and malaise/fatigue.   HENT: Negative for congestion, ear discharge and sore throat.    Eyes: Negative for discharge and pain.   Cardiovascular: Negative for chest pain, claudication and leg swelling.   Respiratory: Negative for cough and shortness of breath.    Skin: Positive for color change. Negative for nail changes, poor wound healing and rash.   Musculoskeletal: Positive for stiffness. Negative for arthritis, joint pain, joint swelling and muscle weakness.   Gastrointestinal: Negative for bloating, abdominal pain, diarrhea, nausea and vomiting.   Genitourinary: Negative for flank pain and hematuria.   Neurological: Positive for numbness and sensory change. Negative for headaches and weakness.   Psychiatric/Behavioral: Negative for altered mental status.             Past Medical History:   Diagnosis Date    Chronic diastolic heart failure     Diabetes mellitus     HLD (hyperlipidemia)     Hypertension        Past Surgical History:   Procedure Laterality Date    COLONOSCOPY Left 6/28/2016    Procedure: COLONOSCOPY;  Surgeon: ONEYDA Mckeon MD;  Location: [facility];  Service: Endoscopy;  Laterality: Left;  prostate exam         Family History   Problem Relation Age of Onset    Hypertension Mother     Diabetes Mother     Kidney disease Mother     Cancer Maternal Uncle     Heart attack Maternal Grandfather        Social History     Socioeconomic History    Marital status:      Spouse name: Not on file    Number of children: Not on file    Years of education: Not on file    Highest education level: Not on file   Occupational History    Not on file   Social Needs    Financial resource strain: Not on file    Food insecurity:     Worry: Not on file     Inability: Not on file    Transportation needs:     Medical: Not on file     Non-medical: Not on file   Tobacco Use    Smoking status:  Current Some Day Smoker     Packs/day: 0.75     Years: 16.00     Pack years: 12.00     Types: Cigarettes    Smokeless tobacco: Never Used    Tobacco comment:  Currently, chews Nicorette gum.   Substance and Sexual Activity    Alcohol use: No    Drug use: No    Sexual activity: Yes     Partners: Female   Lifestyle    Physical activity:     Days per week: Not on file     Minutes per session: Not on file    Stress: Not on file   Relationships    Social connections:     Talks on phone: Not on file     Gets together: Not on file     Attends [employer] service: Not on file     Active member of club or organization: Not on file     Attends meetings of clubs or organizations: Not on file     Relationship status: Not on file   Other Topics Concern    Not on file   Social History Narrative    Not on file       Current Outpatient Medications   Medication Sig Dispense Refill    atorvastatin (LIPITOR) 40 MG tablet atorvastatin 40 mg tablet      cloNIDine (CATAPRES) 0.3 MG tablet Take 0.3 mg by mouth 3 (three) times daily.  0    gabapentin (NEURONTIN) 300 MG capsule gabapentin 300 mg capsule      hydrALAZINE (APRESOLINE) 100 MG tablet Take 1 tablet (100 mg total) by mouth every 8 (eight) hours. 90 tablet 0    ibuprofen (ADVIL,MOTRIN) 800 MG tablet every 4 (four) hours as needed.       insulin glargine (TOUJEO SOLOSTAR) 300 unit/mL (1.5 mL) InPn Inject 40 Units as directed every evening.      insulin regular (NOVOLIN R) 100 unit/mL Inj injection  3 times a day by injection route before meals. ( sliding scale)      meloxicam (MOBIC) 15 MG tablet Take 1 tablet (15 mg total) by mouth once daily. 14 tablet 0    oxyCODONE-acetaminophen (PERCOCET)  mg per tablet Take 1 tablet by mouth 2 (two) times daily as needed.      amlodipine (NORVASC) 10 MG tablet Take 1 tablet (10 mg total) by mouth once daily. 30 tablet 1    HYDROcodone-acetaminophen (NORCO) 5-325 mg per tablet Take 1 tablet by mouth every 4 (four)  "hours as needed for Pain. (Patient not taking: Reported on 2/6/2020) 10 tablet 0    labetalol (NORMODYNE) 300 MG tablet Take 1 tablet (300 mg total) by mouth every 12 (twelve) hours. 60 tablet 0    losartan (COZAAR) 100 MG tablet Take 1 tablet (100 mg total) by mouth once daily. 30 tablet 0     No current facility-administered medications for this visit.        Review of patient's allergies indicates:   Allergen Reactions    Lisinopril Hives       Vitals:    03/16/20 1027   BP: (!) 140/92   Pulse: 82   Resp: 16   Weight: 115.3 kg (254 lb 1.6 oz)   Height: 6' 2" (1.88 m)   PainSc: 0-No pain       Objective:      Physical Exam   Constitutional: He is oriented to person, place, and time. He appears well-developed and well-nourished. No distress.   HENT:   Nose: Nose normal.   Eyes: Conjunctivae are normal.   Neck: Normal range of motion.   Pulmonary/Chest: Effort normal. He exhibits no tenderness.   Abdominal: There is no tenderness.   Neurological: He is alert and oriented to person, place, and time.   Psychiatric: He has a normal mood and affect. His behavior is normal.       Vascular: Distal DP/PT pulses palpable 1/4. CRT < 3 sec to tips of toes. No vericosities noted to LEs. warm to touch LE, No edema noted to LE.    Dermatologic:   Left foot: hyperkeratotic lesion submet2 and 3.   R foot:  Hyperkeratotic lesion distal tip of hallux, sub met 4.     Scaling dryness in a moccasin distribution is noted to the bilateral lower extremities with associated erythema.    Musculoskeletal: MMT 5/5 in DF/PF/Inv/Ev resistance with no reproduction of pain in any direction. Passive range of motion of ankle and pedal joints is painless. No calf tenderness LE, Compartments soft/compressible.  B/l feet: ttp over hyperkeratotic lesions.     Neurological: Light touch, proprioception, and sharp/dull sensation are all intact. Protective threshold with the [place]-Wienstein monofilament is intact. Vibratory sensation intact. "     2/6/20            Assessment:       Encounter Diagnoses   Name Primary?    Corn or callus Yes    Type II diabetes mellitus with neurological manifestations     History of ulcer of lower limb - Left Foot          Plan:       Nicolas was seen today for follow-up and foot ulcer.    Diagnoses and all orders for this visit:    Corn or callus    Type II diabetes mellitus with neurological manifestations    History of ulcer of lower limb - Left Foot      I counseled the patient on his conditions, their implications and medical management.    44 y.o. male with left foot healed diabetic ulcer status post wound debridement.     -no open wounds. +callus formation plantar left foot and distal hallux of right foot. I do not think he will need surgery at this time. No open wounds. I think his callus can be well controlled with proper off loading with DM shoe. If dealing with reoccurrence we will consider surgical option. Pt verbalized understanding.   -stressed importance of wearing DM shoe. Rx. DM is already placed from last visit.     -pt would like to follow up with Dr. Hensley.  -The nature of the condition, options for management, as well as potential risks and complications were discussed in detail with patient. Patient was amenable to my recommendations and left my office fully informed and will follow up as instructed or sooner if necessary.    -Patient was advised of signs and symptoms of infection including redness, drainage, purulence, odor, streaking, fever, chills and I advised patient to seek medical attention (ER or urgent care) if these symptoms arise.   -Advised for optimal glucose control and maintenance per primary care physician. Patient was also educated on healthy diet that is naturally rich in nutrients and low in fat and calories.   -f/u prn       Note dictated with voice recognition software, please excuse any grammatical errors.

## 2020-03-16 NOTE — LETTER
March 16, 2020      [facility]  [address]  [place] [address]  Phone: [phone]  Fax: [phone]       Patient: Nicolas Rock   YOB: 1975  Date of Visit: 03/16/2020    To Whom It May Concern:    Zeke Rock  was at [facility] on 03/16/2020. He may return to work on 03/23/2020 with no restrictions. If you have any questions or concerns, or if I can be of further assistance, please do not hesitate to contact me.    Sincerely,    Renee Wilburn MA

## 2020-04-22 ENCOUNTER — HOSPITAL ENCOUNTER (EMERGENCY)
Facility: HOSPITAL | Age: 45
Discharge: HOME | End: 2020-04-22
Attending: EMERGENCY MEDICINE
Payer: COMMERCIAL

## 2020-04-22 VITALS
HEART RATE: 86 BPM | TEMPERATURE: 98 F | HEIGHT: 74 IN | SYSTOLIC BLOOD PRESSURE: 216 MMHG | RESPIRATION RATE: 20 BRPM | WEIGHT: 260 LBS | BODY MASS INDEX: 33.37 KG/M2 | OXYGEN SATURATION: 100 % | DIASTOLIC BLOOD PRESSURE: 94 MMHG

## 2020-04-22 DIAGNOSIS — L02.91 ABSCESS: Primary | ICD-10-CM

## 2020-04-22 PROCEDURE — 99284 EMERGENCY DEPT VISIT MOD MDM: CPT | Mod: 25,ER

## 2020-04-22 PROCEDURE — 25000003 PHARM REV CODE 250: Mod: ER | Performed by: EMERGENCY MEDICINE

## 2020-04-22 PROCEDURE — 10060 I&D ABSCESS SIMPLE/SINGLE: CPT | Mod: ER

## 2020-04-22 RX ORDER — CLINDAMYCIN HYDROCHLORIDE 150 MG/1
450 CAPSULE ORAL EVERY 8 HOURS
Qty: 63 CAPSULE | Refills: 0 | Status: SHIPPED | OUTPATIENT
Start: 2020-04-22 | End: 2020-04-29

## 2020-04-22 RX ORDER — LIDOCAINE HYDROCHLORIDE 10 MG/ML
10 INJECTION, SOLUTION EPIDURAL; INFILTRATION; INTRACAUDAL; PERINEURAL
Status: COMPLETED | OUTPATIENT
Start: 2020-04-22 | End: 2020-04-22

## 2020-04-22 RX ADMIN — LIDOCAINE HYDROCHLORIDE 50 MG: 10 INJECTION, SOLUTION EPIDURAL; INFILTRATION; INTRACAUDAL; PERINEURAL at 02:04

## 2020-04-22 NOTE — ED PROVIDER NOTES
"Encounter Date: 4/22/2020       History     Chief Complaint   Patient presents with    Arm Swelling     "I was at the methadone clinic and they sent me here because I have swelling on my left arm like a abcess or something again"      Nicolas Rock is a 44 y.o. male who  has a past medical history of Chronic diastolic heart failure, Diabetes mellitus, HLD (hyperlipidemia), and Hypertension.    The patient presents to the ED due to left arm swelling.  Patient reports a history of IV drug use last used 2 days ago he was referred here from methadone clinic for concern for abscess to his left forearm.  Patient reports noticing swelling for the past 5 days.  He reports injecting about site couple days ago.  Denies fever or additional symptoms at this time.  He does have a history of diabetic foot infection and skin popping.  Reports tetanus is up-to-date.        Review of patient's allergies indicates:   Allergen Reactions    Lisinopril Hives     Past Medical History:   Diagnosis Date    Chronic diastolic heart failure     Diabetes mellitus     HLD (hyperlipidemia)     Hypertension      Past Surgical History:   Procedure Laterality Date    COLONOSCOPY Left 6/28/2016    Procedure: COLONOSCOPY;  Surgeon: ONEYDA Mckeon MD;  Location[facility];  Service: Endoscopy;  Laterality: Left;  prostate exam       Family History   Problem Relation Age of Onset    Hypertension Mother     Diabetes Mother     Kidney disease Mother     Cancer Maternal Uncle     Heart attack Maternal Grandfather      Social History     Tobacco Use    Smoking status: Current Some Day Smoker     Packs/day: 0.75     Years: 16.00     Pack years: 12.00     Types: Cigarettes    Smokeless tobacco: Never Used    Tobacco comment:  Currently, chews Nicorette gum.   Substance Use Topics    Alcohol use: No    Drug use: No     Review of Systems   Constitutional: Negative for chills and fever.   HENT: Negative for rhinorrhea, sore throat and " trouble swallowing.    Eyes: Negative for visual disturbance.   Respiratory: Negative for cough and shortness of breath.    Cardiovascular: Negative for chest pain.   Gastrointestinal: Negative for abdominal pain, diarrhea and vomiting.   Genitourinary: Negative for dysuria and hematuria.   Musculoskeletal: Negative for back pain.   Skin: Positive for wound. Negative for rash.   Neurological: Negative for numbness and headaches.   Hematological: Negative for adenopathy.       Physical Exam     Initial Vitals [04/22/20 1345]   BP Pulse Resp Temp SpO2   (!) 222/100 97 19 97.9 °F (36.6 °C) 100 %      MAP       --         Physical Exam    Nursing note and vitals reviewed.  Constitutional: He appears well-developed and well-nourished. He is not diaphoretic. No distress.   HENT:   Head: Normocephalic and atraumatic.   Eyes: Conjunctivae are normal.   Cardiovascular: Regular rhythm and intact distal pulses.   Pulmonary/Chest: No respiratory distress.   Musculoskeletal:        Arms:  Left upper extremity:  Left anterior forearm with 2 x 3 cm area of fluctuance with surrounding induration.  Noncircumferential.  No crepitus noted.  Additional track marks/induration noted along the upper forearm    Pulses palpable and sensation intact to left upper extremity.   Neurological: He is alert.   Skin: Skin is warm and dry. Capillary refill takes less than 2 seconds. No rash noted.   Psychiatric: He has a normal mood and affect.         ED Course   I & D - Incision and Drainage  Date/Time: 4/22/2020 3:52 PM  Performed by: Joon Santos Jr., MD  Authorized by: Joon Santos Jr., MD   Consent Done: Emergent Situation  Type: abscess  Body area: upper extremity  Location details: left arm  Anesthesia: local infiltration    Anesthesia:  Local Anesthetic: lidocaine 1% without epinephrine  Anesthetic total: 5 mL  Patient sedated: no  Scalpel size: 11  Incision type: single straight  Complexity: simple  Drainage: pus  Drainage amount:  moderate  Wound treatment: incision,  expression of material and  deloculation  Packing material: 1/4 in gauze  Complications: No  Patient tolerance: Patient tolerated the procedure well with no immediate complications        Labs Reviewed - No data to display       Imaging Results    None          Medical Decision Making:   Differential Diagnosis:   Differential Diagnosis includes, but is not limited to:  Cellulitis, abscess, necrotizing fasciitis, osteomyelitis, septic joint, MRSA, DVT, drug eruption, allergic/contact dermatitis, EM/SJS, viral exanthem, local trauma/contusion    ED Management:  Patient with history of skin popping/IV drug use presents with abscess to left upper extremity.  No evidence of necrotizing infection or systemic signs at this time.  Vital signs do not suggest sepsis.    Abscess was incised and drained with expression of purulent material.  Patient refused testing for hypertension and hyperglycemia.  Will start on clindamycin for surrounding cellulitis.  I recommend he follow-up with his primary doctor in 2 days to have the wound repacked and for wound recheck.  Patient verbalized understanding.  Return precautions discussed for worsening pain fever drainage swelling or any other concerns.    After taking into careful account the historical factors and physical exam findings of the patient's presentation today, in conjunction with the empirical and objective data obtained on ED workup, no acute emergent medical condition has been identified. The patient appears to be low risk for an emergent medical condition and I feel it is safe and appropriate at this time for the patient to be discharged to follow-up as detailed in their discharge instructions for reevaluation and possible continued outpatient workup and management. I have discussed the specifics of the workup with the patient and the patient has verbalized understanding of the details of the workup, the diagnosis, the treatment plan,  and the need for outpatient follow-up.  Although the patient has no emergent etiology today this does not preclude the development of an emergent condition so in addition, I have advised the patient that they can return to the ED and/or activate EMS at any time with worsening of their symptoms, change of their symptoms, or with any other medical complaint.  The patient remained comfortable and stable during their visit in the ED.  Discharge and follow-up instructions discussed with the patient who expressed understanding and willingness to comply with my recommendations.                    ED Course as of Apr 22 1551 Wed Apr 22, 2020   1409 Patient presents with skin popping assess to left upper extremity.  Vital signs remarkable for hypertension.  Patient is refusing evaluation for hypertension or point of care glucose to evaluate for hyperglycemia.  He is clinically sober and is demonstrated medical decision-making capacity.  Will incise and drain abscess and reassess.    [RN]      ED Course User Index  [RN] Joon Santos Jr., MD                Clinical Impression:       ICD-10-CM ICD-9-CM   1. Abscess L02.91 682.9     Portions of this note were dictated using voice recognition software and may contain dictation related errors in spelling/grammar/syntax not found on text review                               Joon Santos Jr., MD  04/22/20 9806

## 2020-04-22 NOTE — DISCHARGE INSTRUCTIONS
Please follow-up with her primary doctor for wound repacking and wound check in 2 days (this Friday).  Please return immediately if you notice increased swelling drainage fever or any other concerns.

## 2020-08-19 ENCOUNTER — TELEPHONE (OUTPATIENT)
Dept: PODIATRY | Facility: CLINIC | Age: 45
End: 2020-08-19

## 2020-09-02 ENCOUNTER — HOSPITAL ENCOUNTER (INPATIENT)
Facility: HOSPITAL | Age: 45
LOS: 1 days | Discharge: LEFT BEFORE DISCHARGE | DRG: 638 | End: 2020-09-06
Attending: EMERGENCY MEDICINE | Admitting: INTERNAL MEDICINE
Payer: COMMERCIAL

## 2020-09-02 DIAGNOSIS — F14.10 COCAINE ABUSE: ICD-10-CM

## 2020-09-02 DIAGNOSIS — L97.513 DIABETIC ULCER OF TOE OF RIGHT FOOT ASSOCIATED WITH TYPE 2 DIABETES MELLITUS, WITH NECROSIS OF MUSCLE: ICD-10-CM

## 2020-09-02 DIAGNOSIS — I50.9 CHF (CONGESTIVE HEART FAILURE): ICD-10-CM

## 2020-09-02 DIAGNOSIS — E11.52 TYPE 2 DIABETES MELLITUS WITH DIABETIC PERIPHERAL ANGIOPATHY AND GANGRENE, WITH LONG-TERM CURRENT USE OF INSULIN: ICD-10-CM

## 2020-09-02 DIAGNOSIS — I10 UNCONTROLLED HYPERTENSION: ICD-10-CM

## 2020-09-02 DIAGNOSIS — R59.0 INGUINAL LYMPHADENOPATHY: Chronic | ICD-10-CM

## 2020-09-02 DIAGNOSIS — E11.621 DIABETIC ULCER OF TOE OF RIGHT FOOT ASSOCIATED WITH TYPE 2 DIABETES MELLITUS, WITH NECROSIS OF MUSCLE: ICD-10-CM

## 2020-09-02 DIAGNOSIS — F11.10 HEROIN ABUSE: ICD-10-CM

## 2020-09-02 DIAGNOSIS — Z79.4 TYPE 2 DIABETES MELLITUS WITH DIABETIC PERIPHERAL ANGIOPATHY AND GANGRENE, WITH LONG-TERM CURRENT USE OF INSULIN: ICD-10-CM

## 2020-09-02 DIAGNOSIS — Z72.0 TOBACCO ABUSE: ICD-10-CM

## 2020-09-02 DIAGNOSIS — L03.116 CELLULITIS OF LEFT LOWER LEG: Primary | ICD-10-CM

## 2020-09-02 DIAGNOSIS — L97.512 CHRONIC ULCER OF GREAT TOE OF RIGHT FOOT WITH FAT LAYER EXPOSED: ICD-10-CM

## 2020-09-02 DIAGNOSIS — I50.32 CHRONIC DIASTOLIC HEART FAILURE: ICD-10-CM

## 2020-09-02 LAB
ALBUMIN SERPL BCP-MCNC: 3.3 G/DL (ref 3.5–5.2)
ALP SERPL-CCNC: 104 U/L (ref 38–126)
ALT SERPL W/O P-5'-P-CCNC: 17 U/L (ref 10–44)
AMPHET+METHAMPHET UR QL: NEGATIVE
ANION GAP SERPL CALC-SCNC: 6 MMOL/L (ref 8–16)
AST SERPL-CCNC: 28 U/L (ref 15–46)
BACTERIA #/AREA URNS AUTO: NORMAL /HPF
BARBITURATES UR QL SCN>200 NG/ML: NEGATIVE
BASOPHILS # BLD AUTO: 0.07 K/UL (ref 0–0.2)
BASOPHILS NFR BLD: 0.6 % (ref 0–1.9)
BENZODIAZ UR QL SCN>200 NG/ML: NEGATIVE
BILIRUB SERPL-MCNC: 0.6 MG/DL (ref 0.1–1)
BILIRUB UR QL STRIP: ABNORMAL
BUN SERPL-MCNC: 16 MG/DL (ref 2–20)
BZE UR QL SCN: NORMAL
CALCIUM SERPL-MCNC: 8.4 MG/DL (ref 8.7–10.5)
CANNABINOIDS UR QL SCN: NEGATIVE
CHLORIDE SERPL-SCNC: 98 MMOL/L (ref 95–110)
CLARITY UR REFRACT.AUTO: ABNORMAL
CO2 SERPL-SCNC: 28 MMOL/L (ref 23–29)
COLOR UR AUTO: ABNORMAL
CREAT SERPL-MCNC: 0.84 MG/DL (ref 0.5–1.4)
CREAT UR-MCNC: 259 MG/DL (ref 23–375)
DIFFERENTIAL METHOD: ABNORMAL
EOSINOPHIL # BLD AUTO: 0.4 K/UL (ref 0–0.5)
EOSINOPHIL NFR BLD: 3.5 % (ref 0–8)
ERYTHROCYTE [DISTWIDTH] IN BLOOD BY AUTOMATED COUNT: 13.6 % (ref 11.5–14.5)
EST. GFR  (AFRICAN AMERICAN): >60 ML/MIN/1.73 M^2
EST. GFR  (NON AFRICAN AMERICAN): >60 ML/MIN/1.73 M^2
GLUCOSE SERPL-MCNC: 137 MG/DL (ref 70–110)
GLUCOSE UR QL STRIP: NEGATIVE
HCT VFR BLD AUTO: 27.5 % (ref 40–54)
HGB BLD-MCNC: 9.1 G/DL (ref 14–18)
HGB UR QL STRIP: NEGATIVE
HYALINE CASTS UR QL AUTO: 0 /LPF
IMM GRANULOCYTES # BLD AUTO: 0.05 K/UL (ref 0–0.04)
IMM GRANULOCYTES NFR BLD AUTO: 0.4 % (ref 0–0.5)
KETONES UR QL STRIP: ABNORMAL
LACTATE SERPL-SCNC: 0.8 MMOL/L (ref 0.5–2.2)
LEUKOCYTE ESTERASE UR QL STRIP: ABNORMAL
LYMPHOCYTES # BLD AUTO: 2.2 K/UL (ref 1–4.8)
LYMPHOCYTES NFR BLD: 17.7 % (ref 18–48)
MCH RBC QN AUTO: 28.8 PG (ref 27–31)
MCHC RBC AUTO-ENTMCNC: 33.1 G/DL (ref 32–36)
MCV RBC AUTO: 87 FL (ref 82–98)
METHADONE UR QL SCN>300 NG/ML: NEGATIVE
MICROSCOPIC COMMENT: NORMAL
MONOCYTES # BLD AUTO: 1.9 K/UL (ref 0.3–1)
MONOCYTES NFR BLD: 15.4 % (ref 4–15)
NEUTROPHILS # BLD AUTO: 7.8 K/UL (ref 1.8–7.7)
NEUTROPHILS NFR BLD: 62.4 % (ref 38–73)
NITRITE UR QL STRIP: NEGATIVE
NRBC BLD-RTO: 0 /100 WBC
OPIATES UR QL SCN: NORMAL
PCP UR QL SCN>25 NG/ML: NEGATIVE
PH UR STRIP: 6 [PH] (ref 5–8)
PLATELET # BLD AUTO: 301 K/UL (ref 150–350)
PMV BLD AUTO: 9.3 FL (ref 9.2–12.9)
POTASSIUM SERPL-SCNC: 3.9 MMOL/L (ref 3.5–5.1)
PROT SERPL-MCNC: 7.6 G/DL (ref 6–8.4)
PROT UR QL STRIP: ABNORMAL
RBC # BLD AUTO: 3.16 M/UL (ref 4.6–6.2)
RBC #/AREA URNS AUTO: 2 /HPF (ref 0–4)
SODIUM SERPL-SCNC: 132 MMOL/L (ref 136–145)
SP GR UR STRIP: 1.01 (ref 1–1.03)
SQUAMOUS #/AREA URNS AUTO: NORMAL /HPF
TOXICOLOGY INFORMATION: NORMAL
URN SPEC COLLECT METH UR: ABNORMAL
UROBILINOGEN UR STRIP-ACNC: >=8 EU/DL
WBC # BLD AUTO: 12.43 K/UL (ref 3.9–12.7)
WBC #/AREA URNS AUTO: 2 /HPF (ref 0–5)

## 2020-09-02 PROCEDURE — 99285 EMERGENCY DEPT VISIT HI MDM: CPT | Mod: 25,ER

## 2020-09-02 PROCEDURE — 96365 THER/PROPH/DIAG IV INF INIT: CPT | Mod: ER

## 2020-09-02 PROCEDURE — 84145 PROCALCITONIN (PCT): CPT | Mod: ER

## 2020-09-02 PROCEDURE — 96375 TX/PRO/DX INJ NEW DRUG ADDON: CPT | Mod: ER

## 2020-09-02 PROCEDURE — 85025 COMPLETE CBC W/AUTO DIFF WBC: CPT | Mod: ER

## 2020-09-02 PROCEDURE — 80307 DRUG TEST PRSMV CHEM ANLYZR: CPT | Mod: ER

## 2020-09-02 PROCEDURE — 81000 URINALYSIS NONAUTO W/SCOPE: CPT | Mod: 59,ER

## 2020-09-02 PROCEDURE — 87040 BLOOD CULTURE FOR BACTERIA: CPT | Mod: 59,ER

## 2020-09-02 PROCEDURE — 25000003 PHARM REV CODE 250: Mod: ER | Performed by: EMERGENCY MEDICINE

## 2020-09-02 PROCEDURE — 83605 ASSAY OF LACTIC ACID: CPT | Mod: ER

## 2020-09-02 PROCEDURE — 63600175 PHARM REV CODE 636 W HCPCS: Mod: ER | Performed by: EMERGENCY MEDICINE

## 2020-09-02 PROCEDURE — 80053 COMPREHEN METABOLIC PANEL: CPT | Mod: ER

## 2020-09-02 RX ORDER — DOXYCYCLINE 100 MG/1
CAPSULE ORAL
Status: ON HOLD | COMMUNITY
End: 2020-09-03 | Stop reason: ALTCHOICE

## 2020-09-02 RX ADMIN — VANCOMYCIN HYDROCHLORIDE 2000 MG: 1 INJECTION, POWDER, LYOPHILIZED, FOR SOLUTION INTRAVENOUS at 10:09

## 2020-09-02 RX ADMIN — PIPERACILLIN AND TAZOBACTAM 4.5 G: 4; .5 INJECTION, POWDER, LYOPHILIZED, FOR SOLUTION INTRAVENOUS; PARENTERAL at 11:09

## 2020-09-03 ENCOUNTER — CLINICAL SUPPORT (OUTPATIENT)
Dept: SMOKING CESSATION | Facility: CLINIC | Age: 45
End: 2020-09-03
Payer: COMMERCIAL

## 2020-09-03 DIAGNOSIS — F17.210 CIGARETTE SMOKER: Primary | ICD-10-CM

## 2020-09-03 PROBLEM — R59.0 INGUINAL LYMPHADENOPATHY: Chronic | Status: ACTIVE | Noted: 2020-09-03

## 2020-09-03 PROBLEM — L03.116 CELLULITIS OF LEFT LOWER LEG: Status: ACTIVE | Noted: 2020-09-03

## 2020-09-03 PROBLEM — L97.512 CHRONIC ULCER OF GREAT TOE OF RIGHT FOOT WITH FAT LAYER EXPOSED: Status: ACTIVE | Noted: 2020-09-03

## 2020-09-03 PROBLEM — F19.90 IVDU (INTRAVENOUS DRUG USER): Chronic | Status: ACTIVE | Noted: 2020-09-03

## 2020-09-03 LAB
ANION GAP SERPL CALC-SCNC: 9 MMOL/L (ref 8–16)
BASOPHILS # BLD AUTO: 0.07 K/UL (ref 0–0.2)
BASOPHILS NFR BLD: 0.7 % (ref 0–1.9)
BUN SERPL-MCNC: 16 MG/DL (ref 6–20)
CALCIUM SERPL-MCNC: 8.3 MG/DL (ref 8.7–10.5)
CHLORIDE SERPL-SCNC: 94 MMOL/L (ref 95–110)
CO2 SERPL-SCNC: 25 MMOL/L (ref 23–29)
CREAT SERPL-MCNC: 0.9 MG/DL (ref 0.5–1.4)
DIFFERENTIAL METHOD: ABNORMAL
EOSINOPHIL # BLD AUTO: 0.7 K/UL (ref 0–0.5)
EOSINOPHIL NFR BLD: 6.3 % (ref 0–8)
ERYTHROCYTE [DISTWIDTH] IN BLOOD BY AUTOMATED COUNT: 13.9 % (ref 11.5–14.5)
EST. GFR  (AFRICAN AMERICAN): >60 ML/MIN/1.73 M^2
EST. GFR  (NON AFRICAN AMERICAN): >60 ML/MIN/1.73 M^2
ESTIMATED AVG GLUCOSE: 154 MG/DL (ref 68–131)
FERRITIN SERPL-MCNC: 319 NG/ML (ref 20–300)
GLUCOSE SERPL-MCNC: 175 MG/DL (ref 70–110)
HBA1C MFR BLD HPLC: 7 % (ref 4–5.6)
HCT VFR BLD AUTO: 27 % (ref 40–54)
HGB BLD-MCNC: 9 G/DL (ref 14–18)
IMM GRANULOCYTES # BLD AUTO: 0.04 K/UL (ref 0–0.04)
IMM GRANULOCYTES NFR BLD AUTO: 0.4 % (ref 0–0.5)
LYMPHOCYTES # BLD AUTO: 1.7 K/UL (ref 1–4.8)
LYMPHOCYTES NFR BLD: 16.2 % (ref 18–48)
MCH RBC QN AUTO: 29.1 PG (ref 27–31)
MCHC RBC AUTO-ENTMCNC: 33.3 G/DL (ref 32–36)
MCV RBC AUTO: 87 FL (ref 82–98)
MONOCYTES # BLD AUTO: 1.5 K/UL (ref 0.3–1)
MONOCYTES NFR BLD: 14.9 % (ref 4–15)
NEUTROPHILS # BLD AUTO: 6.3 K/UL (ref 1.8–7.7)
NEUTROPHILS NFR BLD: 61.5 % (ref 38–73)
NRBC BLD-RTO: 0 /100 WBC
PLATELET # BLD AUTO: ABNORMAL K/UL (ref 150–350)
PLATELET BLD QL SMEAR: ABNORMAL
PMV BLD AUTO: ABNORMAL FL (ref 9.2–12.9)
POCT GLUCOSE: 157 MG/DL (ref 70–110)
POCT GLUCOSE: 191 MG/DL (ref 70–110)
POTASSIUM SERPL-SCNC: 3.8 MMOL/L (ref 3.5–5.1)
PROCALCITONIN SERPL IA-MCNC: 0.04 NG/ML
RBC # BLD AUTO: 3.09 M/UL (ref 4.6–6.2)
SARS-COV-2 RDRP RESP QL NAA+PROBE: NEGATIVE
SODIUM SERPL-SCNC: 128 MMOL/L (ref 136–145)
WBC # BLD AUTO: 10.27 K/UL (ref 3.9–12.7)

## 2020-09-03 PROCEDURE — 25500020 PHARM REV CODE 255: Mod: ER | Performed by: EMERGENCY MEDICINE

## 2020-09-03 PROCEDURE — 94761 N-INVAS EAR/PLS OXIMETRY MLT: CPT

## 2020-09-03 PROCEDURE — 93010 ELECTROCARDIOGRAM REPORT: CPT | Mod: ,,, | Performed by: INTERNAL MEDICINE

## 2020-09-03 PROCEDURE — 25000003 PHARM REV CODE 250: Performed by: STUDENT IN AN ORGANIZED HEALTH CARE EDUCATION/TRAINING PROGRAM

## 2020-09-03 PROCEDURE — 99407 PR TOBACCO USE CESSATION INTENSIVE >10 MINUTES: ICD-10-PCS | Mod: S$GLB,,,

## 2020-09-03 PROCEDURE — 96376 TX/PRO/DX INJ SAME DRUG ADON: CPT | Performed by: EMERGENCY MEDICINE

## 2020-09-03 PROCEDURE — 63600175 PHARM REV CODE 636 W HCPCS: Performed by: STUDENT IN AN ORGANIZED HEALTH CARE EDUCATION/TRAINING PROGRAM

## 2020-09-03 PROCEDURE — U0002 COVID-19 LAB TEST NON-CDC: HCPCS | Mod: ER

## 2020-09-03 PROCEDURE — 86703 HIV-1/HIV-2 1 RESULT ANTBDY: CPT

## 2020-09-03 PROCEDURE — 82746 ASSAY OF FOLIC ACID SERUM: CPT

## 2020-09-03 PROCEDURE — 83036 HEMOGLOBIN GLYCOSYLATED A1C: CPT

## 2020-09-03 PROCEDURE — 80048 BASIC METABOLIC PNL TOTAL CA: CPT

## 2020-09-03 PROCEDURE — 93005 ELECTROCARDIOGRAM TRACING: CPT

## 2020-09-03 PROCEDURE — G0378 HOSPITAL OBSERVATION PER HR: HCPCS

## 2020-09-03 PROCEDURE — 85025 COMPLETE CBC W/AUTO DIFF WBC: CPT

## 2020-09-03 PROCEDURE — 25000003 PHARM REV CODE 250: Performed by: INTERNAL MEDICINE

## 2020-09-03 PROCEDURE — 93010 EKG 12-LEAD: ICD-10-PCS | Mod: ,,, | Performed by: INTERNAL MEDICINE

## 2020-09-03 PROCEDURE — 99407 BEHAV CHNG SMOKING > 10 MIN: CPT | Mod: S$GLB,,,

## 2020-09-03 PROCEDURE — 82728 ASSAY OF FERRITIN: CPT

## 2020-09-03 PROCEDURE — 96372 THER/PROPH/DIAG INJ SC/IM: CPT | Mod: 59 | Performed by: EMERGENCY MEDICINE

## 2020-09-03 PROCEDURE — 96375 TX/PRO/DX INJ NEW DRUG ADDON: CPT | Performed by: EMERGENCY MEDICINE

## 2020-09-03 PROCEDURE — 99999 PR PBB SHADOW E&M-EST. PATIENT-LVL II: CPT | Mod: PBBFAC,,,

## 2020-09-03 PROCEDURE — 99999 PR PBB SHADOW E&M-EST. PATIENT-LVL II: ICD-10-PCS | Mod: PBBFAC,,,

## 2020-09-03 PROCEDURE — 82607 VITAMIN B-12: CPT

## 2020-09-03 PROCEDURE — 87522 HEPATITIS C REVRS TRNSCRPJ: CPT

## 2020-09-03 PROCEDURE — 25000003 PHARM REV CODE 250: Performed by: EMERGENCY MEDICINE

## 2020-09-03 PROCEDURE — 83540 ASSAY OF IRON: CPT

## 2020-09-03 PROCEDURE — 63600175 PHARM REV CODE 636 W HCPCS: Performed by: INTERNAL MEDICINE

## 2020-09-03 PROCEDURE — C9399 UNCLASSIFIED DRUGS OR BIOLOG: HCPCS | Performed by: STUDENT IN AN ORGANIZED HEALTH CARE EDUCATION/TRAINING PROGRAM

## 2020-09-03 PROCEDURE — 36415 COLL VENOUS BLD VENIPUNCTURE: CPT

## 2020-09-03 PROCEDURE — 96361 HYDRATE IV INFUSION ADD-ON: CPT | Performed by: EMERGENCY MEDICINE

## 2020-09-03 PROCEDURE — 63600175 PHARM REV CODE 636 W HCPCS: Performed by: EMERGENCY MEDICINE

## 2020-09-03 RX ORDER — IBUPROFEN 200 MG
24 TABLET ORAL
Status: DISCONTINUED | OUTPATIENT
Start: 2020-09-03 | End: 2020-09-06 | Stop reason: HOSPADM

## 2020-09-03 RX ORDER — ATORVASTATIN CALCIUM 40 MG/1
40 TABLET, FILM COATED ORAL DAILY
Status: DISCONTINUED | OUTPATIENT
Start: 2020-09-03 | End: 2020-09-03

## 2020-09-03 RX ORDER — GLUCAGON 1 MG
1 KIT INJECTION
Status: DISCONTINUED | OUTPATIENT
Start: 2020-09-03 | End: 2020-09-06 | Stop reason: HOSPADM

## 2020-09-03 RX ORDER — DICYCLOMINE HYDROCHLORIDE 20 MG/1
20 TABLET ORAL EVERY 6 HOURS PRN
Status: DISCONTINUED | OUTPATIENT
Start: 2020-09-03 | End: 2020-09-06 | Stop reason: HOSPADM

## 2020-09-03 RX ORDER — IBUPROFEN 200 MG
16 TABLET ORAL
Status: DISCONTINUED | OUTPATIENT
Start: 2020-09-03 | End: 2020-09-06 | Stop reason: HOSPADM

## 2020-09-03 RX ORDER — HYDRALAZINE HYDROCHLORIDE 25 MG/1
100 TABLET, FILM COATED ORAL EVERY 8 HOURS
Status: DISCONTINUED | OUTPATIENT
Start: 2020-09-03 | End: 2020-09-03

## 2020-09-03 RX ORDER — SODIUM CHLORIDE 0.9 % (FLUSH) 0.9 %
10 SYRINGE (ML) INJECTION
Status: DISCONTINUED | OUTPATIENT
Start: 2020-09-03 | End: 2020-09-06 | Stop reason: HOSPADM

## 2020-09-03 RX ORDER — GABAPENTIN 300 MG/1
300 CAPSULE ORAL 2 TIMES DAILY
Status: DISCONTINUED | OUTPATIENT
Start: 2020-09-03 | End: 2020-09-06 | Stop reason: HOSPADM

## 2020-09-03 RX ORDER — ATORVASTATIN CALCIUM 40 MG/1
40 TABLET, FILM COATED ORAL NIGHTLY
Status: DISCONTINUED | OUTPATIENT
Start: 2020-09-03 | End: 2020-09-06 | Stop reason: HOSPADM

## 2020-09-03 RX ORDER — SODIUM CHLORIDE 9 MG/ML
INJECTION, SOLUTION INTRAVENOUS CONTINUOUS
Status: DISCONTINUED | OUTPATIENT
Start: 2020-09-03 | End: 2020-09-06 | Stop reason: HOSPADM

## 2020-09-03 RX ORDER — AMLODIPINE BESYLATE 5 MG/1
10 TABLET ORAL DAILY
Status: DISCONTINUED | OUTPATIENT
Start: 2020-09-03 | End: 2020-09-03

## 2020-09-03 RX ORDER — CYCLOBENZAPRINE HCL 10 MG
10 TABLET ORAL EVERY 6 HOURS PRN
Status: DISCONTINUED | OUTPATIENT
Start: 2020-09-03 | End: 2020-09-06 | Stop reason: HOSPADM

## 2020-09-03 RX ORDER — FAMOTIDINE 20 MG/1
20 TABLET, FILM COATED ORAL
Status: DISCONTINUED | OUTPATIENT
Start: 2020-09-04 | End: 2020-09-03

## 2020-09-03 RX ORDER — FAMOTIDINE 20 MG/1
20 TABLET, FILM COATED ORAL 2 TIMES DAILY
Status: DISCONTINUED | OUTPATIENT
Start: 2020-09-03 | End: 2020-09-06 | Stop reason: HOSPADM

## 2020-09-03 RX ORDER — CLONIDINE HYDROCHLORIDE 0.3 MG/1
0.3 TABLET ORAL 3 TIMES DAILY
Status: DISCONTINUED | OUTPATIENT
Start: 2020-09-03 | End: 2020-09-06 | Stop reason: HOSPADM

## 2020-09-03 RX ORDER — HYDRALAZINE HYDROCHLORIDE 25 MG/1
100 TABLET, FILM COATED ORAL 2 TIMES DAILY
Status: DISCONTINUED | OUTPATIENT
Start: 2020-09-03 | End: 2020-09-05

## 2020-09-03 RX ORDER — INSULIN ASPART 100 [IU]/ML
1-10 INJECTION, SOLUTION INTRAVENOUS; SUBCUTANEOUS
Status: DISCONTINUED | OUTPATIENT
Start: 2020-09-03 | End: 2020-09-06 | Stop reason: HOSPADM

## 2020-09-03 RX ORDER — ENOXAPARIN SODIUM 100 MG/ML
40 INJECTION SUBCUTANEOUS EVERY 24 HOURS
Status: DISCONTINUED | OUTPATIENT
Start: 2020-09-03 | End: 2020-09-06 | Stop reason: HOSPADM

## 2020-09-03 RX ORDER — TRAZODONE HYDROCHLORIDE 100 MG/1
100 TABLET ORAL NIGHTLY PRN
Status: DISCONTINUED | OUTPATIENT
Start: 2020-09-03 | End: 2020-09-06 | Stop reason: HOSPADM

## 2020-09-03 RX ORDER — ONDANSETRON 8 MG/1
8 TABLET, ORALLY DISINTEGRATING ORAL EVERY 6 HOURS PRN
Status: DISCONTINUED | OUTPATIENT
Start: 2020-09-03 | End: 2020-09-06 | Stop reason: HOSPADM

## 2020-09-03 RX ADMIN — ENOXAPARIN SODIUM 40 MG: 40 INJECTION SUBCUTANEOUS at 05:09

## 2020-09-03 RX ADMIN — HYDRALAZINE HYDROCHLORIDE 100 MG: 25 TABLET, FILM COATED ORAL at 08:09

## 2020-09-03 RX ADMIN — ATORVASTATIN CALCIUM 40 MG: 40 TABLET, FILM COATED ORAL at 09:09

## 2020-09-03 RX ADMIN — INSULIN DETEMIR 10 UNITS: 100 INJECTION, SOLUTION SUBCUTANEOUS at 08:09

## 2020-09-03 RX ADMIN — LABETALOL HYDROCHLORIDE 300 MG: 100 TABLET, FILM COATED ORAL at 05:09

## 2020-09-03 RX ADMIN — IOHEXOL 100 ML: 350 INJECTION, SOLUTION INTRAVENOUS at 01:09

## 2020-09-03 RX ADMIN — VANCOMYCIN HYDROCHLORIDE 1750 MG: 100 INJECTION, POWDER, LYOPHILIZED, FOR SOLUTION INTRAVENOUS at 11:09

## 2020-09-03 RX ADMIN — ATORVASTATIN CALCIUM 40 MG: 40 TABLET, FILM COATED ORAL at 08:09

## 2020-09-03 RX ADMIN — SODIUM CHLORIDE: 0.9 INJECTION, SOLUTION INTRAVENOUS at 03:09

## 2020-09-03 RX ADMIN — GABAPENTIN 300 MG: 300 CAPSULE ORAL at 05:09

## 2020-09-03 RX ADMIN — VANCOMYCIN HYDROCHLORIDE 1750 MG: 100 INJECTION, POWDER, LYOPHILIZED, FOR SOLUTION INTRAVENOUS at 09:09

## 2020-09-03 RX ADMIN — AMLODIPINE BESYLATE 10 MG: 5 TABLET ORAL at 09:09

## 2020-09-03 RX ADMIN — FAMOTIDINE 20 MG: 20 TABLET ORAL at 08:09

## 2020-09-03 RX ADMIN — CLONIDINE HYDROCHLORIDE 0.3 MG: 0.1 TABLET ORAL at 08:09

## 2020-09-03 RX ADMIN — CLONIDINE HYDROCHLORIDE 0.3 MG: 0.1 TABLET ORAL at 09:09

## 2020-09-03 RX ADMIN — HYDRALAZINE HYDROCHLORIDE 100 MG: 25 TABLET, FILM COATED ORAL at 05:09

## 2020-09-03 RX ADMIN — CLONIDINE HYDROCHLORIDE 0.3 MG: 0.1 TABLET ORAL at 03:09

## 2020-09-03 RX ADMIN — PIPERACILLIN AND TAZOBACTAM 4.5 G: 4; .5 INJECTION, POWDER, LYOPHILIZED, FOR SOLUTION INTRAVENOUS; PARENTERAL at 07:09

## 2020-09-03 NOTE — MEDICAL/APP STUDENT
"[facility] Medicine H&P Note     Admitting Team: [facility] Hospitalist Team A  Attending Physician: Vipin White MD  Resident: Tino Marshall MD  Intern: Holly Guadalupe MD    Date of Admit: 9/2/2020    Chief Complaint      "Right toe wound" for 1 week    Subjective:      History of Present Illness:  Nicolas Rock is a 45 y.o. male with a past medical history of chronic diastolic heart failure, DM I, HLD, and chronic foot wounds presented to [facility] as a transfer from [facility] for a wound of the great right toe.     Mr. Rock has a 3 year history of chronic foot wounds with routine wound care appointments. Three weeks ago he experienced increased bloody discharge of a wound on his right great toe.  He underwent debridement of the right great toe with wound care at that time.  Two weeks later he began to experience bilateral foot and leg swelling and increased bloody/pus drainage of the R toe wound.  Yesterday, he was told to go to the ER during his follow up wound care appointment due to how his foot wound looked. He endorses a 3 day history of chills but denies any nausea, fever, vomiting    Pt states has two swollen and tender inguinal lymph nodes. He has had chronic bouts of inflamed inguinal lymph nodes with drainage since he was 25 y.o.  They have been swollen for roughly 10 days with some drainage over a week ago.     He is a current IV drug user last injecting heroin 2 days ago as well as taking oral opioids at that time. He does not lick his needles and uses bottled water to mix with. He occasionally mixes cocaine into his heroin. He is feeling like he may go into withdrawals.        Past Medical History:  Past Medical History:   Diagnosis Date    Chronic diastolic heart failure     Diabetes mellitus     HLD (hyperlipidemia)     Hypertension        Past Surgical History:  Past Surgical History:   Procedure Laterality Date    COLONOSCOPY Left 6/28/2016    Procedure: " "COLONOSCOPY;  Surgeon: ONEYDA Mckeon MD;  Location: [facility];  Service: Endoscopy;  Laterality: Left;  prostate exam         Allergies:  Review of patient's allergies indicates:   Allergen Reactions    Lisinopril Hives     Angioedema        Home Medications:  Prior to Admission medications    Clonidine .3mg TID  Gabapentin 300 mg BID  Labetalol 300 mg QD  Hydralazine 100 mg BID  Levemir 10 U QD  Insulin sliding scale    Family History:  Family History   Problem Relation Age of Onset    Hypertension Mother     Diabetes Mother     Kidney disease Mother     Cancer Maternal Uncle     Heart attack Maternal Grandfather        Social History:  Social History     Tobacco Use    Smoking status: Current Some Day Smoker     Packs/day: 0.50     Years: 16.00     Pack years: 8.00     Types: Cigarettes    Smokeless tobacco: Never Used    Tobacco comment:  Currently, chews Nicorette gum.   Substance Use Topics    Alcohol use: No    Drug use: Yes, IV heroine (2 days ago), opioids, cocaine       Review of Systems:  Constitutional: positive for chills  Eyes: negative  Ears, nose, mouth, throat, and face: negative  Respiratory: negative  Cardiovascular: negative  Gastrointestinal: positive for loose stool  Genitourinary:negative  Hematologic/lymphatic: positive for tender inguinal lymph nodes  Musculoskeletal:negative  Neurological: negative  Behavioral/Psych: negative     All other systems are reviewed and are negative.    Health Maintaince :   Primary Care Physician: Dr. Efrain Berman    Immunizations:   TDap: Not up to date  Flu: Not up to date      Cancer Screening:    Colonoscopy: UT2016     Objective:   Last 24 Hour Vital Signs:  BP  Min: 163/82  Max: 193/96  Temp  Av.8 °F (37.1 °C)  Min: 97.8 °F (36.6 °C)  Max: 100.1 °F (37.8 °C)  Pulse  Av.4  Min: 74  Max: 97  Resp  Av.2  Min: 16  Max: 20  SpO2  Av.1 %  Min: 96 %  Max: 100 %  Height  Av' 2" (188 cm)  Min: 6' 2" (188 cm)  Max: 6' 2" " (188 cm)  Weight  Av.5 kg (239 lb 4.3 oz)  Min: 108.2 kg (238 lb 8.6 oz)  Max: 108.9 kg (240 lb)  Body mass index is 30.63 kg/m².  I/O last 3 completed shifts:  In: 50 [IV Piggyback:50]  Out: -     Physical Examination:  Physical Exam   Constitutional: He is oriented to person, place, and time. Mildly Uncomfortable.   HENT:   Head: Atraumatic.   Right Ear: External ear normal.   Left Ear: External ear normal.   Eyes: Conjunctivae are normal. Right eye exhibits no discharge. Left eye exhibits no discharge. No scleral icterus.   Neck: Neck supple. No thyromegaly present.   Cardiovascular: Normal heart sounds. Exam reveals no friction rub.   No murmur heard.  Pulmonary/Chest: Effort normal and breath sounds normal. He has no wheezes. He has no rales.   Abdominal: Soft. He exhibits no distension. There is no abdominal tenderness.   Genitourinary: Penis normal. Bilateral tender inguinal lymphadenopathy.One 2-3cm lymph node in each inguinal crease, both are soft and mobile. No drainage, sinus tracts, and overlying skin is intact.  Musculoskeletal:  Bilateral leg swelling from foot until distal thigh. Skin is shiny, warm and intact over the lower legs except for the right hallux.  Feet are dry, rough, and swollen.  There is an open and tender wound on the plantar hallux.  No exposed bone or purulent discharge. Bilateral dorsalis pedis pulses not palpable with current swelling. No crepitus or streaking present. Bilateral onychomycosis present.  Neurological: He is alert and oriented to person, place, and time. He displays normal reflexes.     Laboratory:  Most Recent Data:  CBC:   Lab Results   Component Value Date    WBC 12.43 2020    HGB 9.1 (L) 2020    HCT 27.5 (L) 2020     2020    MCV 87 2020    RDW 13.6 2020       BMP:   Lab Results   Component Value Date     (L) 2020    K 3.9 2020    CL 98 2020    CO2 28 2020    BUN 16 2020     CREATININE 0.84 09/02/2020     (H) 09/02/2020    CALCIUM 8.4 (L) 09/02/2020     LFTs:   Lab Results   Component Value Date    PROT 7.6 09/02/2020    ALBUMIN 3.3 (L) 09/02/2020    BILITOT 0.6 09/02/2020    AST 28 09/02/2020    ALKPHOS 104 09/02/2020    ALT 17 09/02/2020     DM:   Lab Results   Component Value Date    HGBA1C 7.0 (H) 09/03/2020                      CREATININE 0.9 09/03/2020     Anemia:   Lab Results   Component Value Date    FERRITIN 319 (H) 09/03/2020     Urinalysis:   Lab Results   Component Value Date               COLORU Argenis 09/02/2020    SPECGRAV 1.015 09/02/2020    NITRITE Negative 09/02/2020    KETONESU 1+ (A) 09/02/2020    UROBILINOGEN >=8.0 (A) 09/02/2020    WBCUA 2 09/02/2020       Trended Lab Data:  Recent Labs   Lab 09/02/20  2252   WBC 12.43   HGB 9.1*   HCT 27.5*      MCV 87   RDW 13.6   *   K 3.9   CL 98   CO2 28   BUN 16   CREATININE 0.84   *   PROT 7.6   ALBUMIN 3.3*   BILITOT 0.6   AST 28   ALKPHOS 104   ALT 17     Diff:  Gran 62.4%, Lymph 17.7%, Mono 15.4%, Eosin 3.5%, Baso .07%.  Trended Cardiac Data:  No results for input(s): TROPONINI, CKTOTAL, CKMB, BNP in the last 168 hours.    Microbiology Data:  9/2 Blood cultures x2: pending    Other Results:      Radiology:  Imaging Results          CT Foot With Contrast Right (Final result)  Result time 09/03/20 09:54:05    Final result by Fady Myers MD (09/03/20 09:54:05)                 Impression:      As above.      Electronically signed by: Fady Myers  Date:    09/03/2020  Time:    09:54             Narrative:    EXAMINATION:  CT FOOT WITH CONTRAST RIGHT    CLINICAL HISTORY:  Osteomyelitis suspected, diabetic;Cellulitis Foot;    TECHNIQUE:  CT right foot with IV contrast.  100 mL Omnipaque 350 IV.  Coronal and sagittal reconstructions provided.  All CT scans at this location are performed using dose modulation techniques as appropriate to a performed exam including the following: Automated exposure  control; adjustment of the mA and/or kV  according to patient size.    COMPARISON:  03/16/2020    FINDINGS:  Diffuse subcutaneous edema throughout the right foot suspicious for cellulitis.  No appreciable rim enhancing fluid collection to suggest abscess.  No soft tissue gas or foreign body.  Advanced atherosclerotic calcification.  Mild degenerative changes of the dorsal midfoot.  Joint spaces otherwise appear well maintained.  No evidence of osseous erosion.  If there is concern for osteomyelitis, consider further evaluation with right foot MRI without and with IV contrast.                                   Assessment:     Nicolas Rock is a 45 y.o. male with: Cellulitis of bilateral lower legs and right hallux wound, potential bacteremia with history of IVDA who is currently being treated empirically with antibiotics.       Plan:     Right Hallux wound  -3 year history of chronic foot wounds 2/2 to 35 year history of DM  -9/2 CT contrast of R foot indicates cellulitis of right hallux  -High clinical suspicion for osteomyelitis, MRI w/ contrast pending   -Blood x2 pending  -Started on empiric Vanc/Zosyn, will reevaluate after blood culture results return  -Consult sent to wound care    Bilateral Lower Leg Cellulitis  -Warm, swollen bilateral lower extremity likely due to cellulitis  -Blood cultures x2 pending, treating with empiric abx  -Wound care consult    Normocytic Anemia  -Admission H/H 9/27, 3 years ago 14.7/42.2  -Likely anemia of chronic disease due to elevated ferritin of 319, normocytic RBC's and pt denies hematochezia/melena  -Iron studies, folate, and Vit B12 pending for further workup    Proteinuria  -9/2 UA 3+ protein, likely 2/2 to longstanding DM.  -Renal function WNL, Cr .84, GFR >60  -Unclear if transient, repeat UA tomorrow    Chronic Hypertension  -176/89 on admission  -Continuing on home meds of amlodipine 10mg, clonidine .3mg TID, hydralazine 100mg BID  -Most recent pressure 158/83 after  medication administration, will continue to monitor     Chronic Congestive Heart Failure, Diastolic Dysfunction  -Echo in Nov 2016 EF 55-60 with LV diastolic dysfunction, mild mitral regurg  -Using lipid panel from 2018, ASCVD 10 year risk of 30.3% recommending high intensity statin  -Started on atorvastatin 40mg, will repeat ASCVD risk with repeat lipid panel    Hyponatremia  -admission Na 132 further decreased to 128  -Pt is asymptomatic, repleting with .9% NaCl infusion    Intravenous Drug Use  -Extensive history of IV drug use, last use 2 days ago.  -Blood cultures, HIV, HepC pending,  -high risk of withdrawal, PRN trazodone 100mg, cyclobenzaprine 10mg, dicyclomine 20mg    Chronic Intermediate Inguinal Lymphadenopathy  -Recurrent cycles of draining bilateral inguinal lymphadenopathy since 25 y.o.  -Inguinal lymph nodes currently tender and swollen  -monitor closely will consider ultrasound    DM I  -Diabetic since 10 years old, 9/3 A1C 7.0  -Home meds: Sliding scale insulin, 10 units levimir  -Continue home sliding scale insulin            Code Status: Full   Diet: Diabetic  Ppx: Enoxaparin 40mg  Dispo: pending improvement of right toe wound    Shay Lr  [facility] MS4    [facility] Medicine Hospitalist Pager numbers:   [facility] Hospitalist Medicine Team A (Cindy/Chhaya): [phone]  [facility] Hospitalist Medicine Team B (Kentrell/Nehemias):  [phone]

## 2020-09-03 NOTE — PROGRESS NOTES
Individual Follow-Up Form    9/3/2020    Quit Date: To be determined    Clinical Status of Patient: Inpatient    Length of Service: 30 minutes    Comments: Smoking cessation education provided. Pt is a 0.75 pk/day cigarette smoker x 33 years. He states that he does not wish to use the nicotine patch during this hospital admission.  Pt is currently enrolled in the [employer].     Diagnosis: F17.210    Next Visit:  Ambulatory referral to Smoking Cessation program following hospital discharge

## 2020-09-03 NOTE — PROGRESS NOTES
Pharmacist Renal Dose Adjustment Note    Nicolas Rock is a 45 y.o. male being treated with the medication famotidine     Patient Data:    Vital Signs (Most Recent):  Temp: 97 °F (36.1 °C) (09/03/20 1246)  Pulse: 76 (09/03/20 1246)  Resp: 18 (09/03/20 1246)  BP: (!) 158/83 (09/03/20 1246)  SpO2: 97 % (09/03/20 1246)   Vital Signs (72h Range):  Temp:  [97 °F (36.1 °C)-100.1 °F (37.8 °C)]   Pulse:  [74-97]   Resp:  [16-20]   BP: (158-193)/(82-96)   SpO2:  [96 %-100 %]      Recent Labs   Lab 09/02/20 2252   CREATININE 0.84     Serum creatinine: 0.84 mg/dL 09/02/20 2252  Estimated creatinine clearance: 142.4 mL/min    Medication:famotidine  dose: 20 frequency qd will be changed to medication:famotidine dose:20mg frequency:bid    Pharmacist's Name: Kaya Altman  Pharmacist's Extension: [phone]

## 2020-09-03 NOTE — PROGRESS NOTES
Pharmacokinetic Initial Assessment: IV Vancomycin    Assessment/Plan:    Initiate intravenous vancomycin with loading dose of 2000 mg once  @ [facility] 22:30followed by a maintenance dose of vancomycin 1750mg IV every 12 hours  Desired empiric serum trough concentration is 10 to 15 mcg/mL  Draw vancomycin trough level 60 min prior to fourth dose on 9-4 at approximately 09:30  Pharmacy will continue to follow and monitor vancomycin.      Please contact pharmacy at extension [phone] with any questions regarding this assessment.     Thank you for the consult,   Blu Galindo       Patient brief summary:  Nicolas Rock is a 45 y.o. male initiated on antimicrobial therapy with IV Vancomycin for treatment of suspected skin & soft tissue infection    Drug Allergies:   Review of patient's allergies indicates:   Allergen Reactions    Lisinopril Hives       Actual Body Weight:   108.2 kg    Renal Function:   Estimated Creatinine Clearance: 142.4 mL/min (based on SCr of 0.84 mg/dL).,     Dialysis Method (if applicable):  N/A    CBC (last 72 hours):  Recent Labs   Lab Result Units 09/02/20  2252   WBC K/uL 12.43   Hemoglobin g/dL 9.1*   Hematocrit % 27.5*   Platelets K/uL 301   Gran% % 62.4   Lymph% % 17.7*   Mono% % 15.4*   Eosinophil% % 3.5   Basophil% % 0.6   Differential Method  Automated       Metabolic Panel (last 72 hours):  Recent Labs   Lab Result Units 09/02/20  2252 09/02/20  2253   Sodium mmol/L 132*  --    Potassium mmol/L 3.9  --    Chloride mmol/L 98  --    CO2 mmol/L 28  --    Glucose mg/dL 137*  --    Glucose, UA   --  Negative   BUN, Bld mg/dL 16  --    Creatinine mg/dL 0.84  --    Creatinine, Random Ur mg/dL  --  259.0   Albumin g/dL 3.3*  --    Total Bilirubin mg/dL 0.6  --    Alkaline Phosphatase U/L 104  --    AST U/L 28  --    ALT U/L 17  --        Drug levels (last 3 results):  No results for input(s): VANCOMYCINRA, VANCOMYCINPE, VANCOMYCINTR in the last 72 hours.    Microbiologic Results:  Microbiology  Results (last 7 days)       Procedure Component Value Units Date/Time    Blood culture [[id number]] Collected: 09/02/20 2338    Order Status: Sent Specimen: Blood from Peripheral, Antecubital, Right Updated: 09/02/20 2352    Blood culture [[id number]] Collected: 09/02/20 2245    Order Status: Sent Specimen: Blood from Peripheral, Wrist, Left Updated: 09/02/20 3512
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
## 2020-09-03 NOTE — PLAN OF CARE
Patient AAOx3  Lives at home with wife  Independent with ADL's  No dme or home health    This  put name on white board and explained blue discharge folder to patient. Discharge planning brochure and/or business card given to patient.  Patient verbalized understanding.    PCP is Dr. Efrain Berman       09/03/20 1011   Discharge Assessment   Assessment Type Discharge Planning Assessment   Confirmed/corrected address and phone number on facesheet? Yes   Assessment information obtained from? Patient   Communicated expected length of stay with patient/caregiver yes   Prior to hospitilization cognitive status: Alert/Oriented   Prior to hospitalization functional status: Independent   Current cognitive status: Alert/Oriented   Current Functional Status: Independent   Lives With spouse   Able to Return to Prior Arrangements yes   Is patient able to care for self after discharge? Yes   Patient's perception of discharge disposition home or selfcare   Readmission Within the Last 30 Days no previous admission in last 30 days   Patient currently being followed by outpatient case management? No   Patient currently receives any other outside agency services? No   Equipment Currently Used at Home none   Do you have any problems affording any of your prescribed medications? No   Is the patient taking medications as prescribed? yes   Does the patient have transportation home? Yes   Transportation Anticipated family or friend will provide   Discharge Plan A Home   Discharge Plan B Home with family   DME Needed Upon Discharge  none   Patient/Family in Agreement with Plan yes     Desi Avery RN, CCM, CMSRN  RN Transition Navigator  [phone]

## 2020-09-03 NOTE — PLAN OF CARE
Problem: Adult Inpatient Plan of Care  Goal: Chart reviewed  Outcome: Ongoing, Progressing   VN completed admission questions with patient. Plan of care was discussed including VTE prophylaxis of lovenox.  All questions answered.   Education given on safety measures and using call light before getting out of bed by himself. Patient verbalized understanding. Bed locked and in lowest position. Alarm on.

## 2020-09-03 NOTE — ED NOTES
Called report to JUNITO Jhaveri at [facility] med/surg. Relayed all pertinent pt information.

## 2020-09-03 NOTE — PLAN OF CARE
VN cued into pt's room for introduction. VN informed pt that VN would be working along side bedside nurse and PCT throughout shift. Level of present pain assessed. At present no distress noted. Patient states feeling better today. Discussed with patient the plan of care. Discussed with patient High fall risk protocol and interventions that have been initiated and cont be in place for safety. Patient verbalized clear understanding and cooperation using teach back method. Bed alarm presently activated and in use. Will cont to be available to patient and intervene prn.

## 2020-09-03 NOTE — H&P
[facility] Medicine H&P Note     Admitting Team: [facility] Hospitalist Team A  Attending Physician: Vipin White MD  Resident: Rolando  Intern: Anayeli     Date of Admit: 9/2/2020    Chief Complaint     Worsening foot wound on right for 1 week.     Subjective:      History of Present Illness:  Nicolas Rock is a 45 y.o. AA male who  has a past medical history of Chronic diastolic heart failure, Diabetes mellitus, HLD (hyperlipidemia), and Hypertension.. The patient presented to [facility] on 9/2/2020 with a primary complaint of a worsening wound on his right great toe. Pt reports he has had an issues with foot wound for 3 years, including several hospitalizations.     The patient was in their usual state of health until 1 week prior to presentation, which includes being independent all his ADLs, but dealing with chronic foot ulcers. Three weeks prior to presentation he had a right hallux wound debrided by Dr. Hensley, Podiatry, he tolerated that well. He noted increased in swelling of his bilateral feet and legs as well as draining from the wound on his right great toe 1 week prior to admission. He denies fevers, chills, n/v/d, dysuria, SOB or cough at this time. He kept his previously scheduled follow with Dr. Hensley's office, and at that apt there was concern for infection and he was directed to report to his local ED.    Pt was transferred from OSH with fever, hypertension, bilateral leg swelling concerning for cellulitis and the right hallux wound.     Pt endorsed a history of injection heroine and PO opiate addiction. Last use was 2 days prior to presentation, when he injected a mixture of heroine and cocaine. Pt uses bottled water to inject, denies licking his needles.     Of note, pt reports a long history of intermittently swollen lymph nodes in his groin. He reports that since he was 25 years old he has had multiple nodes that will swell, then drain purulent material then resolve. Pt denies  penile sores or discharge and no recent sexual activity.     Past Medical History:  Past Medical History:   Diagnosis Date    Chronic diastolic heart failure     Diabetes mellitus     HLD (hyperlipidemia)     Hypertension        Past Surgical History:  Past Surgical History:   Procedure Laterality Date    COLONOSCOPY Left 6/28/2016    Procedure: COLONOSCOPY;  Surgeon: ONEYDA Mckeon MD;  Location: [facility];  Service: Endoscopy;  Laterality: Left;  prostate exam         Allergies:  Review of patient's allergies indicates:   Allergen Reactions    Lisinopril Hives     Angioedema        Home Medications:  Prior to Admission medications    Medication Sig Start Date End Date Taking? Authorizing Provider           cloNIDine (CATAPRES) 0.3 MG tablet Take 0.3 mg by mouth 3 (three) times daily. 9/12/16  Yes Historical Provider, MD           gabapentin (NEURONTIN) 300 MG capsule gabapentin 300 mg capsule   Yes Historical Provider, MD   hydrALAZINE (APRESOLINE) 100 MG tablet Take 1 tablet (100 mg total) by mouth every 8 (eight) hours.  Patient taking differently: Take 100 mg by mouth 2 (two) times a day.  12/1/16  Yes Pallavi Sunkara, MD           insulin detemir U-100 (LEVEMIR) 100 unit/mL injection Inject 10 Units into the skin every evening.   Yes Historical Provider, MD   insulin regular (NOVOLIN R) 100 unit/mL Inj injection  3 times a day by injection route before meals. ( sliding scale)   Yes Historical Provider, MD   labetalol (NORMODYNE) 300 MG tablet Take 1 tablet (300 mg total) by mouth every 12 (twelve) hours. 12/1/16 9/3/20 Yes Pallavi Sunkara, MD                                                       Family History:  Family History   Problem Relation Age of Onset    Hypertension Mother     Diabetes Mother     Kidney disease Mother     Cancer Maternal Uncle     Heart attack Maternal Grandfather        Social History:  Social History     Tobacco Use    Smoking status: Current Some Day Smoker      "Packs/day: 0.50     Years: 33.00     Pack years: 16.50     Types: Cigarettes     Start date:     Smokeless tobacco: Never Used    Tobacco comment:  Pt is enrolled in the [employer]. Ambulatory referral to Smoking Cessation program. Currently, chews Nicorette gum.   Substance Use Topics    Alcohol use: No    Drug use: No       Review of Systems:  Pertinent items are noted in HPI. All other systems are reviewed and are negative.    Health Maintaince :   Primary Care Physician: Efrain Berman Jr. MD    Immunizations:   TDap not up to date     Flu not up to date    Pna not up to date    Cancer Screening:  Colonoscopy: uptodate, last in , no findings      Objective:   Last 24 Hour Vital Signs:  BP  Min: 158/83  Max: 193/96  Temp  Av.3 °F (36.8 °C)  Min: 97 °F (36.1 °C)  Max: 100.1 °F (37.8 °C)  Pulse  Av.7  Min: 74  Max: 97  Resp  Av.1  Min: 16  Max: 20  SpO2  Av.1 %  Min: 96 %  Max: 100 %  Height  Av' 2" (188 cm)  Min: 6' 2" (188 cm)  Max: 6' 2" (188 cm)  Weight  Av.5 kg (239 lb 4.3 oz)  Min: 108.2 kg (238 lb 8.6 oz)  Max: 108.9 kg (240 lb)  Body mass index is 30.63 kg/m².  I/O last 3 completed shifts:  In: 150 [IV Piggyback:150]  Out: -     Physical Examination:  Physical Exam   Constitutional:  Non-toxic appearance. He does not appear ill. No distress.   Somewhat somnolent AA male in acute distress     HENT:   Head: Normocephalic and atraumatic.   Nose: Nose normal. No rhinorrhea or nasal congestion.   Mouth/Throat: Mucous membranes are moist.   Eyes: Pupils are equal, round, and reactive to light. Conjunctivae are normal. No scleral icterus.   Neck: Normal range of motion. No muscular tenderness present. No neck rigidity.   Cardiovascular: Normal rate, regular rhythm and normal heart sounds.   No murmur heard.  Dorsalis pedis pulses bilaterally unable to be detected 2/2 local tissue swelling     Pulmonary/Chest: Effort normal and breath sounds normal. He has no " wheezes. He has no rhonchi. He has no rales.   Abdominal: Soft. Bowel sounds are normal. He exhibits no distension. There is no abdominal tenderness.   Genitourinary:    Genitourinary Comments: uncircumcised penis, no obvious cankers  Bilateral inguinal lymphadenopathy, mobile, rubbery, 2-3 cm      Musculoskeletal:         General: Tenderness present. Swelling: bilateral swelling from feet to knees       Right lower leg: Edema present.      Left lower leg: Edema present.      Comments: Calor, dolor, and edema bilaterally from feet to knees   Lymphadenopathy:     He has no cervical adenopathy.   Neurological: He is alert.   Moving all extremities spontaneously    Skin: He is not diaphoretic. No jaundice or pallor.   Psychiatric: His behavior is normal. Mood normal.   Nursing note and vitals reviewed.         Laboratory:  Most Recent Data:  CBC:   Lab Results   Component Value Date    WBC 10.27 09/03/2020    HGB 9.0 (L) 09/03/2020    HCT 27.0 (L) 09/03/2020    PLT SEE COMMENT 09/03/2020    MCV 87 09/03/2020    RDW 13.9 09/03/2020     WBC Differential: 62.4 % N, 0 % Bands, 17.1 % L, 15.4 % M, 3.5 % Eo, 0.6 % Baso, no additional cells seen  BMP:   Lab Results   Component Value Date     (L) 09/03/2020    K 3.8 09/03/2020    CL 94 (L) 09/03/2020    CO2 25 09/03/2020    BUN 16 09/03/2020    CREATININE 0.9 09/03/2020     (H) 09/03/2020    CALCIUM 8.3 (L) 09/03/2020     LFTs:   Lab Results   Component Value Date    PROT 7.6 09/02/2020    ALBUMIN 3.3 (L) 09/02/2020    BILITOT 0.6 09/02/2020    AST 28 09/02/2020    ALKPHOS 104 09/02/2020    ALT 17 09/02/2020     Coags: No results found for: INR, PROTIME, PTT  FLP:   Lab Results   Component Value Date    CHOL 125 11/29/2016    HDL 35 (L) 11/29/2016    LDLCALC 66.8 11/29/2016    TRIG 116 11/29/2016    CHOLHDL 28.0 11/29/2016     Anemia:   Lab Results   Component Value Date    FERRITIN 319 (H) 09/03/2020     Procal: 0.04  HbgA1C: 7.0    Trended Lab Data:  Recent Labs    Lab 09/02/20  2252 09/03/20  1325   WBC 12.43 10.27   HGB 9.1* 9.0*   HCT 27.5* 27.0*    SEE COMMENT   MCV 87 87   RDW 13.6 13.9   * 128*   K 3.9 3.8   CL 98 94*   CO2 28 25   BUN 16 16   CREATININE 0.84 0.9   * 175*   PROT 7.6  --    ALBUMIN 3.3*  --    BILITOT 0.6  --    AST 28  --    ALKPHOS 104  --    ALT 17  --          Microbiology Data:  9/2/2020 Blood culture x2: in process     Other Results:  EKG (my interpretation): not done at OSH, ordered at admission     Radiology:  Imaging Results          CT Foot With Contrast Right (Edited Result - FINAL)  Result time 09/03/20 15:32:29    Addendum 1 of 1 by Fady Myers MD (09/03/20 15:32:29)      Note, the CT scan did not include the toes, which were collimated from field of view.  Thus, presence or absence of osteomyelitis cannot be determined based on this exam.  Recommend further evaluation with right forefoot MRI without and with IV contrast.      Electronically signed by: Fady Myers  Date:    09/03/2020  Time:    15:32               Final result by Fady Myers MD (09/03/20 09:54:05)                 Impression:      As above.      Electronically signed by: Fady Myers  Date:    09/03/2020  Time:    09:54             Narrative:    EXAMINATION:  CT FOOT WITH CONTRAST RIGHT    CLINICAL HISTORY:  Osteomyelitis suspected, diabetic;Cellulitis Foot;    TECHNIQUE:  CT right foot with IV contrast.  100 mL Omnipaque 350 IV.  Coronal and sagittal reconstructions provided.  All CT scans at this location are performed using dose modulation techniques as appropriate to a performed exam including the following: Automated exposure control; adjustment of the mA and/or kV  according to patient size.    COMPARISON:  03/16/2020    FINDINGS:  Diffuse subcutaneous edema throughout the right foot suspicious for cellulitis.  No appreciable rim enhancing fluid collection to suggest abscess.  No soft tissue gas or foreign body.  Advanced atherosclerotic  calcification.  Mild degenerative changes of the dorsal midfoot.  Joint spaces otherwise appear well maintained.  No evidence of osseous erosion.  If there is concern for osteomyelitis, consider further evaluation with right foot MRI without and with IV contrast.                                     Assessment:     Nicolas Rock is a 45 y.o. male with diastolic heart failure, DM1, HTN, HLD and chronic foot wound who presents today with bilateral leg cellulitis and right hallux wound, also concern for bacteremia in the setting of IVDU.      Plan:     Right Hallux Wound  - s/p debridement with Podiatry 3 weeks ago, new purulent drainage   - CT foot showed no bony erosion, but osteomyelitis could not be ruled out  - no bone exposure seen on exam  - vanc and zosyn IV  - MRI foot to evaluate for osteo  - wound care consulted    Bilateral lower leg cellulitis  - bilateral calor, dolor and swelling of lower legs of 1 week duration  - concern could have a component of CHF  - vanc of zosyn IV    Intravenous Drug Use  -Extensive history of IV drug use, last use 2 days ago.  -Blood cultures, HIV, HepC pending, TTE  -high risk of withdrawal, PRN trazodone 100mg, cyclobenzaprine 10mg, dicyclomine 20mg    Diastolic Heart Failure  - last ECHO was 2016 showing diastolic dysfunction with preserved ejection fraction  - will repeat ECHO on this admission to ensure maximal medical management  - currently on hydralazine, labetalol and clonidine   - Using lipid panel from 2018, ASCVD 10 year risk of 30.3% recommending high intensity statin  - Started on atorvastatin 40mg, will repeat ASCVD risk with repeat lipid panel    Diabetes Mellitus, Type 1 with neuropathy   - cannot confirm type 1 vs type 2, but pt reports being diagnosed at age 10 and is insulin dependent   - pt reports sliding scale regular insulin with meals and 10 units of detemir nightly  -  HbgA1c is 7.0 at this admission   - will continue SSI and nightly basal 10 units    - will continue home gabapentin 300 BID    HTN  - BP on admission was 193/96  - home meds include hydralazine 100 BID, labetalol 300 BID, clonidine 0.3 TID  - will continue home meds    Chronic Intermediate Inguinal Lymphadenopathy  -Recurrent cycles of draining bilateral inguinal lymphadenopathy since 25 y.o.  -Inguinal lymph nodes currently tender and swollen  -monitor closely will consider ultrasound    Normocytic Anemia  -Admission H/H 9/27, 3 years ago 14.7/42.2  -Likely anemia of chronic disease due to elevated ferritin of 319, normocytic RBC's and pt denies hematochezia/melena  -Iron studies, folate, and Vit B12 pending for further workup      Proteinuria  -9/2 UA 3+ protein, likely 2/2 to longstanding DM.  -Renal function WNL, Cr .84, GFR >60  -Unclear if transient or chronic 2/2 DM, repeat UA tomorrow, checking microablumin/creatine ratio    Code Status:     Full code  Ppx: lovenox  Diet: diabetic     Dispo: evaluate for osteomyelitis, medically maximize heart failure       Tino Marshall MD  [facility] Internal Medicine -III    [facility] Medicine Hospitalist Pager numbers:   [facility] Hospitalist Medicine Team A (Cindy/Chhaya): [phone]  [facility] Hospitalist Medicine Team B (Kentrell/Nehemias):  [phone]

## 2020-09-04 LAB
ALBUMIN SERPL BCP-MCNC: 2.6 G/DL (ref 3.5–5.2)
ALBUMIN/CREAT UR: 629.5 UG/MG (ref 0–30)
ALP SERPL-CCNC: 111 U/L (ref 55–135)
ALT SERPL W/O P-5'-P-CCNC: 17 U/L (ref 10–44)
ANION GAP SERPL CALC-SCNC: 10 MMOL/L (ref 8–16)
ANION GAP SERPL CALC-SCNC: 9 MMOL/L (ref 8–16)
ANION GAP SERPL CALC-SCNC: 9 MMOL/L (ref 8–16)
AST SERPL-CCNC: 19 U/L (ref 10–40)
BACTERIA #/AREA URNS HPF: NORMAL /HPF
BASOPHILS # BLD AUTO: 0.08 K/UL (ref 0–0.2)
BASOPHILS NFR BLD: 0.7 % (ref 0–1.9)
BILIRUB SERPL-MCNC: 0.4 MG/DL (ref 0.1–1)
BILIRUB UR QL STRIP: NEGATIVE
BUN SERPL-MCNC: 19 MG/DL (ref 6–20)
BUN SERPL-MCNC: 19 MG/DL (ref 6–20)
BUN SERPL-MCNC: 20 MG/DL (ref 6–20)
CALCIUM SERPL-MCNC: 8.2 MG/DL (ref 8.7–10.5)
CALCIUM SERPL-MCNC: 8.5 MG/DL (ref 8.7–10.5)
CALCIUM SERPL-MCNC: 8.5 MG/DL (ref 8.7–10.5)
CHLORIDE SERPL-SCNC: 97 MMOL/L (ref 95–110)
CHLORIDE SERPL-SCNC: 98 MMOL/L (ref 95–110)
CHLORIDE SERPL-SCNC: 98 MMOL/L (ref 95–110)
CHOLEST SERPL-MCNC: 113 MG/DL (ref 120–199)
CHOLEST/HDLC SERPL: 2.7 {RATIO} (ref 2–5)
CLARITY UR: CLEAR
CO2 SERPL-SCNC: 24 MMOL/L (ref 23–29)
CO2 SERPL-SCNC: 25 MMOL/L (ref 23–29)
CO2 SERPL-SCNC: 25 MMOL/L (ref 23–29)
COLOR UR: YELLOW
CREAT SERPL-MCNC: 1 MG/DL (ref 0.5–1.4)
CREAT SERPL-MCNC: 1.1 MG/DL (ref 0.5–1.4)
CREAT SERPL-MCNC: 1.1 MG/DL (ref 0.5–1.4)
CREAT UR-MCNC: 88 MG/DL (ref 23–375)
DIFFERENTIAL METHOD: ABNORMAL
EOSINOPHIL # BLD AUTO: 0.7 K/UL (ref 0–0.5)
EOSINOPHIL NFR BLD: 6.7 % (ref 0–8)
ERYTHROCYTE [DISTWIDTH] IN BLOOD BY AUTOMATED COUNT: 13.8 % (ref 11.5–14.5)
EST. GFR  (AFRICAN AMERICAN): >60 ML/MIN/1.73 M^2
EST. GFR  (NON AFRICAN AMERICAN): >60 ML/MIN/1.73 M^2
FOLATE SERPL-MCNC: 6.3 NG/ML (ref 4–24)
GLUCOSE SERPL-MCNC: 120 MG/DL (ref 70–110)
GLUCOSE SERPL-MCNC: 131 MG/DL (ref 70–110)
GLUCOSE SERPL-MCNC: 131 MG/DL (ref 70–110)
GLUCOSE UR QL STRIP: NEGATIVE
HCT VFR BLD AUTO: 28.8 % (ref 40–54)
HDLC SERPL-MCNC: 42 MG/DL (ref 40–75)
HDLC SERPL: 37.2 % (ref 20–50)
HGB BLD-MCNC: 9.4 G/DL (ref 14–18)
HGB UR QL STRIP: NEGATIVE
HIV 1+2 AB+HIV1 P24 AG SERPL QL IA: NEGATIVE
HYALINE CASTS #/AREA URNS LPF: 0 /LPF
IMM GRANULOCYTES # BLD AUTO: 0.03 K/UL (ref 0–0.04)
IMM GRANULOCYTES NFR BLD AUTO: 0.3 % (ref 0–0.5)
IRON SERPL-MCNC: 23 UG/DL (ref 45–160)
KETONES UR QL STRIP: NEGATIVE
LDLC SERPL CALC-MCNC: 59.8 MG/DL (ref 63–159)
LEUKOCYTE ESTERASE UR QL STRIP: NEGATIVE
LYMPHOCYTES # BLD AUTO: 2.4 K/UL (ref 1–4.8)
LYMPHOCYTES NFR BLD: 21.6 % (ref 18–48)
MAGNESIUM SERPL-MCNC: 1.8 MG/DL (ref 1.6–2.6)
MCH RBC QN AUTO: 28.6 PG (ref 27–31)
MCHC RBC AUTO-ENTMCNC: 32.6 G/DL (ref 32–36)
MCV RBC AUTO: 88 FL (ref 82–98)
MICROALBUMIN UR DL<=1MG/L-MCNC: 554 UG/ML
MICROSCOPIC COMMENT: NORMAL
MONOCYTES # BLD AUTO: 1.9 K/UL (ref 0.3–1)
MONOCYTES NFR BLD: 17.5 % (ref 4–15)
NEUTROPHILS # BLD AUTO: 5.9 K/UL (ref 1.8–7.7)
NEUTROPHILS NFR BLD: 53.2 % (ref 38–73)
NITRITE UR QL STRIP: NEGATIVE
NONHDLC SERPL-MCNC: 71 MG/DL
NRBC BLD-RTO: 0 /100 WBC
PH UR STRIP: 6 [PH] (ref 5–8)
PHOSPHATE SERPL-MCNC: 4.4 MG/DL (ref 2.7–4.5)
PLATELET # BLD AUTO: 309 K/UL (ref 150–350)
PMV BLD AUTO: 8.9 FL (ref 9.2–12.9)
POCT GLUCOSE: 120 MG/DL (ref 70–110)
POCT GLUCOSE: 178 MG/DL (ref 70–110)
POCT GLUCOSE: 196 MG/DL (ref 70–110)
POCT GLUCOSE: 201 MG/DL (ref 70–110)
POTASSIUM SERPL-SCNC: 3.8 MMOL/L (ref 3.5–5.1)
POTASSIUM SERPL-SCNC: 3.9 MMOL/L (ref 3.5–5.1)
POTASSIUM SERPL-SCNC: 3.9 MMOL/L (ref 3.5–5.1)
PROT SERPL-MCNC: 7.7 G/DL (ref 6–8.4)
PROT UR QL STRIP: ABNORMAL
RBC # BLD AUTO: 3.29 M/UL (ref 4.6–6.2)
RBC #/AREA URNS HPF: 1 /HPF (ref 0–4)
SATURATED IRON: 8 % (ref 20–50)
SODIUM SERPL-SCNC: 131 MMOL/L (ref 136–145)
SODIUM SERPL-SCNC: 132 MMOL/L (ref 136–145)
SODIUM SERPL-SCNC: 132 MMOL/L (ref 136–145)
SP GR UR STRIP: 1.01 (ref 1–1.03)
TOTAL IRON BINDING CAPACITY: 287 UG/DL (ref 250–450)
TRANSFERRIN SERPL-MCNC: 194 MG/DL (ref 200–375)
TRIGL SERPL-MCNC: 56 MG/DL (ref 30–150)
URN SPEC COLLECT METH UR: ABNORMAL
UROBILINOGEN UR STRIP-ACNC: ABNORMAL EU/DL
VANCOMYCIN TROUGH SERPL-MCNC: 17.5 UG/ML (ref 10–22)
VIT B12 SERPL-MCNC: 555 PG/ML (ref 210–950)
WBC # BLD AUTO: 11.09 K/UL (ref 3.9–12.7)
WBC #/AREA URNS HPF: 1 /HPF (ref 0–5)

## 2020-09-04 PROCEDURE — 84100 ASSAY OF PHOSPHORUS: CPT

## 2020-09-04 PROCEDURE — 36415 COLL VENOUS BLD VENIPUNCTURE: CPT

## 2020-09-04 PROCEDURE — 82043 UR ALBUMIN QUANTITATIVE: CPT

## 2020-09-04 PROCEDURE — 96372 THER/PROPH/DIAG INJ SC/IM: CPT | Mod: 59 | Performed by: EMERGENCY MEDICINE

## 2020-09-04 PROCEDURE — 25000003 PHARM REV CODE 250: Performed by: STUDENT IN AN ORGANIZED HEALTH CARE EDUCATION/TRAINING PROGRAM

## 2020-09-04 PROCEDURE — 63600175 PHARM REV CODE 636 W HCPCS: Performed by: STUDENT IN AN ORGANIZED HEALTH CARE EDUCATION/TRAINING PROGRAM

## 2020-09-04 PROCEDURE — 80048 BASIC METABOLIC PNL TOTAL CA: CPT

## 2020-09-04 PROCEDURE — 80053 COMPREHEN METABOLIC PANEL: CPT

## 2020-09-04 PROCEDURE — 96376 TX/PRO/DX INJ SAME DRUG ADON: CPT | Performed by: EMERGENCY MEDICINE

## 2020-09-04 PROCEDURE — 96361 HYDRATE IV INFUSION ADD-ON: CPT | Performed by: EMERGENCY MEDICINE

## 2020-09-04 PROCEDURE — 81000 URINALYSIS NONAUTO W/SCOPE: CPT

## 2020-09-04 PROCEDURE — 85025 COMPLETE CBC W/AUTO DIFF WBC: CPT

## 2020-09-04 PROCEDURE — 80061 LIPID PANEL: CPT

## 2020-09-04 PROCEDURE — 94761 N-INVAS EAR/PLS OXIMETRY MLT: CPT

## 2020-09-04 PROCEDURE — 80202 ASSAY OF VANCOMYCIN: CPT

## 2020-09-04 PROCEDURE — G0378 HOSPITAL OBSERVATION PER HR: HCPCS

## 2020-09-04 PROCEDURE — 96374 THER/PROPH/DIAG INJ IV PUSH: CPT | Mod: 59 | Performed by: EMERGENCY MEDICINE

## 2020-09-04 PROCEDURE — 83735 ASSAY OF MAGNESIUM: CPT

## 2020-09-04 RX ORDER — METRONIDAZOLE 500 MG/1
500 TABLET ORAL EVERY 8 HOURS
Status: DISCONTINUED | OUTPATIENT
Start: 2020-09-04 | End: 2020-09-06 | Stop reason: HOSPADM

## 2020-09-04 RX ORDER — CLONIDINE HYDROCHLORIDE 0.3 MG/1
0.3 TABLET ORAL 3 TIMES DAILY
Qty: 90 TABLET | Refills: 1 | Status: SHIPPED | OUTPATIENT
Start: 2020-09-04

## 2020-09-04 RX ORDER — FERROUS SULFATE 325(65) MG
325 TABLET, DELAYED RELEASE (ENTERIC COATED) ORAL DAILY
Status: DISCONTINUED | OUTPATIENT
Start: 2020-09-04 | End: 2020-09-06 | Stop reason: HOSPADM

## 2020-09-04 RX ORDER — METOPROLOL SUCCINATE 25 MG/1
25 TABLET, EXTENDED RELEASE ORAL DAILY
Qty: 30 TABLET | Refills: 11 | Status: SHIPPED | OUTPATIENT
Start: 2020-09-04 | End: 2021-01-01

## 2020-09-04 RX ORDER — HYDRALAZINE HYDROCHLORIDE 100 MG/1
100 TABLET, FILM COATED ORAL EVERY 8 HOURS
Qty: 90 TABLET | Refills: 0 | Status: SHIPPED | OUTPATIENT
Start: 2020-09-04

## 2020-09-04 RX ORDER — CEFEPIME HYDROCHLORIDE 1 G/50ML
2 INJECTION, SOLUTION INTRAVENOUS
Status: DISCONTINUED | OUTPATIENT
Start: 2020-09-04 | End: 2020-09-06 | Stop reason: HOSPADM

## 2020-09-04 RX ORDER — METOPROLOL SUCCINATE 25 MG/1
25 TABLET, EXTENDED RELEASE ORAL DAILY
Status: DISCONTINUED | OUTPATIENT
Start: 2020-09-05 | End: 2020-09-06 | Stop reason: HOSPADM

## 2020-09-04 RX ORDER — FERROUS SULFATE 325(65) MG
325 TABLET ORAL EVERY OTHER DAY
Qty: 15 TABLET | Refills: 1 | Status: SHIPPED | OUTPATIENT
Start: 2020-09-04

## 2020-09-04 RX ADMIN — CLONIDINE HYDROCHLORIDE 0.3 MG: 0.1 TABLET ORAL at 08:09

## 2020-09-04 RX ADMIN — FAMOTIDINE 20 MG: 20 TABLET ORAL at 08:09

## 2020-09-04 RX ADMIN — CEFEPIME HYDROCHLORIDE 2 G: 2 INJECTION, SOLUTION INTRAVENOUS at 07:09

## 2020-09-04 RX ADMIN — GABAPENTIN 300 MG: 300 CAPSULE ORAL at 08:09

## 2020-09-04 RX ADMIN — VANCOMYCIN HYDROCHLORIDE 1750 MG: 100 INJECTION, POWDER, LYOPHILIZED, FOR SOLUTION INTRAVENOUS at 10:09

## 2020-09-04 RX ADMIN — VANCOMYCIN HYDROCHLORIDE 1750 MG: 100 INJECTION, POWDER, LYOPHILIZED, FOR SOLUTION INTRAVENOUS at 09:09

## 2020-09-04 RX ADMIN — LABETALOL HYDROCHLORIDE 300 MG: 100 TABLET, FILM COATED ORAL at 08:09

## 2020-09-04 RX ADMIN — ATORVASTATIN CALCIUM 40 MG: 40 TABLET, FILM COATED ORAL at 08:09

## 2020-09-04 RX ADMIN — ENOXAPARIN SODIUM 40 MG: 40 INJECTION SUBCUTANEOUS at 05:09

## 2020-09-04 RX ADMIN — PIPERACILLIN AND TAZOBACTAM 4.5 G: 4; .5 INJECTION, POWDER, LYOPHILIZED, FOR SOLUTION INTRAVENOUS; PARENTERAL at 06:09

## 2020-09-04 RX ADMIN — HYDRALAZINE HYDROCHLORIDE 100 MG: 25 TABLET, FILM COATED ORAL at 08:09

## 2020-09-04 RX ADMIN — CYCLOBENZAPRINE 10 MG: 10 TABLET, FILM COATED ORAL at 08:09

## 2020-09-04 RX ADMIN — INSULIN ASPART 2 UNITS: 100 INJECTION, SOLUTION INTRAVENOUS; SUBCUTANEOUS at 11:09

## 2020-09-04 RX ADMIN — SODIUM CHLORIDE: 0.9 INJECTION, SOLUTION INTRAVENOUS at 07:09

## 2020-09-04 RX ADMIN — INSULIN ASPART 2 UNITS: 100 INJECTION, SOLUTION INTRAVENOUS; SUBCUTANEOUS at 08:09

## 2020-09-04 RX ADMIN — METRONIDAZOLE 500 MG: 500 TABLET ORAL at 09:09

## 2020-09-04 RX ADMIN — FERROUS SULFATE TAB EC 325 MG (65 MG FE EQUIVALENT) 325 MG: 325 (65 FE) TABLET DELAYED RESPONSE at 11:09

## 2020-09-04 RX ADMIN — INSULIN DETEMIR 10 UNITS: 100 INJECTION, SOLUTION SUBCUTANEOUS at 08:09

## 2020-09-04 RX ADMIN — PIPERACILLIN AND TAZOBACTAM 4.5 G: 4; .5 INJECTION, POWDER, LYOPHILIZED, FOR SOLUTION INTRAVENOUS; PARENTERAL at 02:09

## 2020-09-04 RX ADMIN — PIPERACILLIN AND TAZOBACTAM 4.5 G: 4; .5 INJECTION, POWDER, LYOPHILIZED, FOR SOLUTION INTRAVENOUS; PARENTERAL at 12:09

## 2020-09-04 RX ADMIN — CLONIDINE HYDROCHLORIDE 0.3 MG: 0.1 TABLET ORAL at 02:09

## 2020-09-04 NOTE — DISCHARGE SUMMARY
[facility] Hospital Medicine AMA Summary    Primary Team: [facility] Hospitalist Team A  Attending Physician: Vipin White MD  Resident: Rolando  Intern: Anayeli    Date of Admit: 2020  Date of Discharge: 2020    Discharge to: Home  Condition: Poor    Discharge Diagnoses     Patient Active Problem List   Diagnosis    Uncontrolled hypertension    Family history of colon cancer    Constipation    IDDM (insulin dependent diabetes mellitus)    Phimosis    Accelerated hypertension    Chronic diastolic heart failure    Type 1 diabetes mellitus with circulatory complication    Morbid obesity due to excess calories    Elevated troponin    Tobacco abuse    Acute cystitis without hematuria    History of ulcer of lower limb - Left Foot    Cellulitis of left lower leg    IVDU (intravenous drug user)    Chronic ulcer of great toe of right foot with fat layer exposed    Inguinal lymphadenopathy    Cocaine abuse    Diabetic ulcer of toe of right foot associated with type 2 diabetes mellitus, with necrosis of muscle    Heroin abuse    Type 2 diabetes mellitus with diabetic peripheral angiopathy and gangrene, with long-term current use of insulin       Consultants and Procedures     Consultants:  Infectious Disease  Podiatry    Imagin20 MRI Foot    Great toe inflammatory changes as above including osteomyelitis changes of the distal phalanx and distal aspect of the proximal phalanx.  Likely reactive edema changes with mid and proximal aspect of the 1st proximal phalanx although osteomyelitis not excluded either.    20 TTE  · Concentric left ventricular hypertrophy.  · Severe left atrial enlargement.  · Left ventricular systolic function. The estimated ejection fraction is 60%.  · Grade III (severe) left ventricular diastolic dysfunction consistent with restrictive physiology.  · Normal right ventricular systolic function.  · Mild tricuspid regurgitation.  · Mild mitral regurgitation.  · Intermediate  central venous pressure (8 mmHg).  · The estimated PA systolic pressure is 48 mmHg.  · Pulmonary hypertension present.    Brief History of Present Illness      Nicolas Rock is a 45 y.o. AA male with PMHx of  chronic diastolic heart failure, Diabetes mellitus, HLD (hyperlipidemia), and HTN who presented to [facility] on 9/2/2020 with a primary complaint of a worsening wound on his right great toe. Pt reports he has had an issues with foot wound for 3 years, including several hospitalizations.      The patient was in their usual state of health until 1 week prior to presentation, which includes being independent all his ADLs, but dealing with chronic foot ulcers. Three weeks prior to presentation he had a right hallux wound debrided by Dr. Hensley, Podiatry, he tolerated that well. He noted increased in swelling of his bilateral feet and legs as well as draining from the wound on his right great toe 1 week prior to admission. He denies fevers, chills, n/v/d, dysuria, SOB or cough at this time. He kept his previously scheduled follow with Dr. Hensley's office, and at that apt there was concern for infection and he was directed to report to his local ED.     Pt was transferred from OSH with fever, hypertension, bilateral leg swelling concerning for cellulitis and the right hallux wound.      Pt endorsed a history of injection heroin and PO opiate addiction. Last use was 2 days prior to presentation, when he injected a mixture of heroin and cocaine. Pt uses bottled water to inject, denies licking his needles.      Of note, pt reports a long history of intermittently swollen lymph nodes in his groin. He reports that since he was 25 years old he has had multiple nodes that will swell, then drain purulent material then resolve. Pt denies penile sores or discharge and no recent sexual activity.     For the full HPI please refer to the History & Physical from this admission.    Patient was started empirically  on IV abx. Found to have osteomyelitis of R great toe (distal phalanx & part of proximal phalanx) on MRI. Became somnolent on Day 3 of hospitalization, nurse concerned for possible drug use. Patient requested to AMA when security was called to search his room. Was told the risks of leaving including worsening infection and death but still elected to AMA.    Hospital Course By Problem with Pertinent Findings     Right Hallux Osteomyelitis  - s/p debridement with Podiatry 3 weeks ago, new purulent drainage   - no bone exposure seen on exam  - Patient started on vanc and zosyn IV in the ED. Switched to IV vanc, cefepime, and flagyl per ID recommendations.   - MRI R foot significant for osteomyelitis of the distal phalanx & distal aspect of proximal phalanx of right great toe  - Podiatry consulted, recommended IV antibiotics x6 weeks + surgical debridement  - Patient left AMA (see above as well as Plan of Care note by Dr. Naranjo)  - D/c'd on augmentin 875 BID and doxycycline 100 BID  - close f/u with podiatry and PCP     Bilateral lower leg cellulitis  - bilateral calor, dolor and swelling of lower legs of 1 week duration  - concern could have a component of CHF  - abx as above     Intravenous Drug Use  -Extensive history of IV drug use, last use 2 days ago.  -Blood cultures NGTD, HIV negative, HepC pending,   -high risk of withdrawal, PRN trazodone 100mg, cyclobenzaprine 10mg, dicyclomine 20mg     Diastolic Heart Failure  - last ECHO was 2016 showing diastolic dysfunction with preserved ejection fraction  - Echo this admission as above, again demonstrating diastolic dysfunction (Grade III) and preserved EF (60%)  - on hydralazine, labetalol and clonidine at home; switched from labetalol 300 mg BID to Toprol-XL 25 mg QD and increase hydralazine to TID     Diabetes Mellitus, Type 1 with neuropathy   - cannot confirm type 1 vs type 2, but pt reports being diagnosed at age 10 and is insulin dependent   - pt reports  sliding scale regular insulin with meals and 10 units of detemir nightly  -  HbgA1c is 7.0 at this admission   - continued SSI and nightly basal 10 units   - continued home gabapentin 300 BID     HTN  - BP on admission was 193/96  - home meds include hydralazine 100 BID, labetalol 300 BID, clonidine 0.3 TID  -  D/c'd labetalol 300 mg BID and started Toprol-XL 25 mg QD  - increased home hydralazine 100 BID to TID     Chronic Intermediate Inguinal Lymphadenopathy; suspect hidradenitis suppurativa.   -Recurrent cycles of draining bilateral inguinal lymphadenopathy since 25 y.o.  -Inguinal lymph nodes currently tender and swollen     Normocytic Anemia  -Admission H/H 9/27, 3 years ago 14.7/42.2  -Likely anemia of chronic disease due to elevated ferritin of 319, normocytic RBC's and pt denies hematochezia/melena  -Iron studies show low iron and TIBC, folate, and Vit B12 wnl  - started ferrous sulfate 325 mg every other day       Proteinuria  -9/2 UA 3+ protein, likely 2/2 to longstanding DM.  -Renal function WNL, Cr .84, GFR >60  -microablumin/creatine ratio is elevated   -repeat UA yesterday with 1+ protein  - f/u as outpatient    Discharge Medications      Nicolas Rock   Home Medication Instructions JOHANN:[id number]    Printed on:09/04/20 1312   Medication Information                      atorvastatin (LIPITOR) 40 MG tablet  atorvastatin 40 mg tablet             cloNIDine (CATAPRES) 0.3 MG tablet  Take 1 tablet (0.3 mg total) by mouth 3 (three) times daily.             ferrous sulfate 325 (65 FE) MG EC tablet  Take 1 tablet (325 mg total) by mouth every other day.             gabapentin (NEURONTIN) 300 MG capsule  300 mg 2 (two) times daily.              hydrALAZINE (APRESOLINE) 100 MG tablet  Take 1 tablet (100 mg total) by mouth every 8 (eight) hours.             insulin detemir U-100 (LEVEMIR) 100 unit/mL injection  Inject 10 Units into the skin every evening.              insulin regular (NOVOLIN R) 100 unit/mL  Inj injection   3 times a day by injection route before meals. ( sliding scale)             l           metoprolol succinate (TOPROL-XL) 25 MG 24 hr tablet  Take 1 tablet (25 mg total) by mouth once daily.                 Discharge Information:   Diet:  Diabetic (low carb, 2000kcal), cardiac    Physical Activity:  As tolerated             Instructions:  1. Take all medications as prescribed  2. Keep all follow-up appointments  3. Return to the hospital or call your primary care physicians if any worsening symptoms such as fever, chest pain, shortness of breath, return of symptoms, or any other concerns.    Follow-Up Appointments:  PCP  Wound Care    Yoli De Luna MD  [facility] Internal Medicine-Pediatrics, -I

## 2020-09-04 NOTE — CONSULTS
"Infectious Disease Consult Note:    Date of admission: 9/3/2020  Consulting Physician: Dr. White  Reason for Consult: right toe wound   Chief Complaint: right toe wound    History of Present Illness:  Patient is a 45 y.o. year old male with hx of HFpEF, HTN, DM, HLD, IV heroin and cocaine use presenting from Podiatry clinic for worsening right great toe wound. Patient with hx of intermittent toe wounds for which he follows with Podiatry. No hx of osteomyelitis or amputations. Most recent wound to the right great tow about 3 weeks ago. No recollection of stepping on something. Had debridement with Podiatry 3 weeks ago however wound continued to get worse. Patient went to Podiatry appointment on 9/2 and was advised to go to the ED due to worsening wound. Patient never felt febrile. Patient is unclear of timing of antibiotics but thinks he has taken Cipro, Cephalexin, and Doxy recently. Medication list from last Podiatry visit with Doxy 100 mg daily and Clinda 300 mg 4x daily. Patient went to [facility] and was transferred to [facility] for admission.     Per podiatry note on Care Everywhere  9/2 "wound probing down to bone with Osteomyelitis almost definite." "Deep cultures were taken on 2 separate swabs for a Rubik and anaerobic cultures today."    No nausea, vomiting, chest pain, shortness or breath, pain to legs with walking. Patient has noted some swelling to bilateral lower extremities for the past 2 weeks, symmetric. No leg pain. Patient also has chronic history of intermittent inguinal lymphadenopathy and drainage, with about 3 episodes per year and the current episode lasting for the past 2 week. No penile discharge or ulcers. Sexually active within 1 yr with 1 female partner with condom use. No hx of STI per patient. Current IV drug user with combination of heroin and cocaine about 3x/per week. Uses his own needles and cleans them with chlorine and water. Does not share needles.     Review of " Symptoms:  Otherwise negative    Past Medical History:  Past Medical History:   Diagnosis Date    Chronic diastolic heart failure     Diabetes mellitus     HLD (hyperlipidemia)     Hypertension        Past Surgical History:  Past Surgical History:   Procedure Laterality Date    COLONOSCOPY Left 6/28/2016    Procedure: COLONOSCOPY;  Surgeon: ONEYDA Mckeon MD;  Location: [facility];  Service: Endoscopy;  Laterality: Left;  prostate exam       Family History:  Family History   Problem Relation Age of Onset    Hypertension Mother     Diabetes Mother     Kidney disease Mother     Cancer Maternal Uncle     Heart attack Maternal Grandfather      Social History:  Social History     Socioeconomic History    Marital status:      Spouse name: Not on file    Number of children: Not on file    Years of education: Not on file    Highest education level: Not on file   Occupational History    Not on file   Social Needs    Financial resource strain: Not on file    Food insecurity     Worry: Not on file     Inability: Not on file    Transportation needs     Medical: Not on file     Non-medical: Not on file   Tobacco Use    Smoking status: Current Some Day Smoker     Packs/day: 0.50     Years: 33.00     Pack years: 16.50     Types: Cigarettes     Start date: 1987    Smokeless tobacco: Never Used    Tobacco comment:  Pt is enrolled in the [employer]. Ambulatory referral to Smoking Cessation program. Currently, chews Nicorette gum.   Substance and Sexual Activity    Alcohol use: No    Drug use: No    Sexual activity: Yes     Partners: Female   Lifestyle    Physical activity     Days per week: Not on file     Minutes per session: Not on file    Stress: Not on file   Relationships    Social connections     Talks on phone: Not on file     Gets together: Not on file     Attends [employer] service: Not on file     Active member of club or organization: Not on file     Attends meetings of clubs or  organizations: Not on file     Relationship status: Not on file   Other Topics Concern    Not on file   Social History Narrative    Not on file     Allergies:  Review of patient's allergies indicates:   Allergen Reactions    Lisinopril Hives     Angioedema        Pertinent Medications:  Antibiotics:   Antibiotics (From admission, onward)    Start     Stop Route Frequency Ordered    09/03/20 1500  piperacillin-tazobactam 4.5 g in dextrose 5 % 100 mL IVPB (ready to mix system)      -- IV Every 8 hours (non-standard times) 09/03/20 1237    09/03/20 1030  vancomycin (VANCOCIN) 1,750 mg in dextrose 5 % 500 mL IVPB      -- IV Every 12 hours (non-standard times) 09/03/20 0510        Physical Exam:  VS (24h):   Vitals:    09/04/20 1147   BP: (!) 149/77   Pulse: 80   Resp: 16   Temp: 98.2 °F (36.8 °C)     Temp:  [97.5 °F (36.4 °C)-99.6 °F (37.6 °C)]     General: Afebrile, alert, comfortable, no acute distress.   HEENT: DAX. EOMI, no scleral icterus. No sinus tenderness. MMM.  Pulmonary: Non labored,clear to auscultation A/P/L. No wheezing, crackles, or rhonchi.  Cardiac: normal S1 & S2 w/o rubs/murmurs/gallops.  Abdominal: Non-tender, non-distended.Bowel sounds present x 4. No appreciable hepatosplenomegaly. Multiple small inguinal papules without noticeable discharge  Patient with significant lymphadenopathy R>L. No penile discharge or lesions.   Extremities: Moves all extremities x 4. No peripheral edema. 2+ pulses.  Skin: Pitting edema to bilateral lower extremities, dry skin. Lesion to right great toe as below. Foul smelling, tender, no purulent discharge.   Neuro:  Alert and oriented x 4.  CN II-XII grossly intact, sensation intact x 4.           Lines:  PIV LUE    Labs:  CBC:   Lab Results   Component Value Date    WBC 11.09 09/04/2020    WBC 10.27 09/03/2020    WBC 12.43 09/02/2020    WBC 7.28 12/01/2016    WBC 8.47 11/29/2016    HGB 9.4 (L) 09/04/2020    HCT 28.8 (L) 09/04/2020    MCV 88 09/04/2020      09/04/2020     BMP:   Recent Labs   Lab 09/04/20  0613 09/04/20  1302   *  131* 120*   *  132* 131*   K 3.9  3.9 3.8   CL 98  98 97   CO2 25  25 24   BUN 19 19 20   CREATININE 1.1  1.1 1.0   CALCIUM 8.5*  8.5* 8.2*   MG 1.8  --      LFT:   Lab Results   Component Value Date    ALT 17 09/04/2020    AST 19 09/04/2020    ALKPHOS 111 09/04/2020    BILITOT 0.4 09/04/2020     Microbiology x 7d:   Microbiology Results (last 7 days)     Procedure Component Value Units Date/Time    Blood culture [[id number]] Collected: 09/02/20 2338    Order Status: Completed Specimen: Blood from Peripheral, Antecubital, Right Updated: 09/04/20 1412     Blood Culture, Routine No Growth to date      No Growth to date    Blood culture [[id number]] Collected: 09/02/20 2245    Order Status: Completed Specimen: Blood from Peripheral, Wrist, Left Updated: 09/04/20 1412     Blood Culture, Routine No Growth to date      No Growth to date        Imaging:  CT Foot, right 9/2/2020  Diffuse subcutaneous edema throughout the right foot suspicious for cellulitis.  No appreciable rim enhancing fluid collection to suggest abscess.  No soft tissue gas or foreign body.  Advanced atherosclerotic calcification.  Mild degenerative changes of the dorsal midfoot.  Joint spaces otherwise appear well maintained.  No evidence of osseous erosion.  If there is concern for osteomyelitis, consider further evaluation with right foot MRI without and with IV contrast.    Assessment and Plan:    Diabetic foot ulcer and concern for osteomyelitis of right great toe  - Continue Vancomycin  - Would change Zosyn to Cefepime and Flagyl  - Agree with MRI, will follow up results  - Patient had deep wound cultures drawn from Podiatry appointment per care everywhere. Will attempt to obtain culture data to guide antibiotic therapy.  - We will continue to follow

## 2020-09-04 NOTE — PLAN OF CARE
Plan of care reviewed with patient. Normal sinus rhythm on continuous heart monitor, no true red alarms. Patient receiving IV antibiotics.No adverse reaction noted.Blood glucose maintained per MD orders.  Dressing to right foot clean, dry and intact. NS infusing at 100ml/ hr. Safety maintained at this time. Bed in lowest position, side rails up x 2. Call light in reach.  Encouraged patient to use call light for assistance .Verbalized understanding. Will continue to monitor patient.

## 2020-09-04 NOTE — PROGRESS NOTES
Per MD note:  Dispo: evaluate for osteomyelitis, medically maximize heart failure     Doing well this morning, still feeling the affects of withdrawal from heroine. Still able to weight bare on his right foot.    Pt remains in observation status X 3 days    TN will continue to follow

## 2020-09-04 NOTE — PROGRESS NOTES
[facility] Medicine Progress Note    Primary Team: [facility] Hospitalist Team A  Attending Physician: Vipin White MD  Resident: Rolando   Intern: Anayeli    Subjective:      Doing well this morning, still feeling the affects of withdrawal from heroine. Still able to weight bare on his right foot. No acute events overnight. Denies increased pain, chest pain, SOB.      Objective:     Last 24 Hour Vital Signs:  BP  Min: 132/75  Max: 174/88  Temp  Av °F (36.7 °C)  Min: 97 °F (36.1 °C)  Max: 99.6 °F (37.6 °C)  Pulse  Av.8  Min: 71  Max: 85  Resp  Av.3  Min: 18  Max: 20  SpO2  Av.9 %  Min: 95 %  Max: 99 %  I/O last 3 completed shifts:  In: 1140 [P.O.:490; IV Piggyback:650]  Out: 2000 [Urine:2000]    Physical Examination:  Physical Exam  Constitutional:  Non-toxic appearance. He does not appear ill. No distress.   Pulmonary/Chest: Effort normal and breath sounds normal. He has no wheezes. He has no rhonchi. He has no rales.   Abdominal: Soft. Bowel sounds are normal. He exhibits no distension. There is no abdominal tenderness.   Cardiovascular: Normal rate, regular rhythm and normal heart sounds.   No murmur heard.  Dorsalis pedis pulses bilaterally unable to be detected 2/2 local tissue swelling  Genitourinary:    Genitourinary Comments: uncircumcised penis, no obvious cankers  Bilateral inguinal lymphadenopathy, mobile, rubbery, 2-3 cm     Musculoskeletal:         General: Tenderness present. Swelling: bilateral swelling from feet to knees       Right lower leg: Edema present.      Left lower leg: Edema present.      Comments: Calor, dolor, and edema bilaterally from feet to knees   Lymphadenopathy:     He has no cervical adenopathy.   Neurological: He is alert.   Moving all extremities spontaneously    Skin: multiple well healed hypopigmented scars    Laboratory:  Laboratory Data Reviewed: yes  Pertinent Findings:      Microbiology Data Reviewed: yes  Pertinent Findings:  2020 blood culture  x2 no growth to date    Other Results:  EKG (my interpretation): none new    Radiology Data Reviewed: yes  Pertinent Findings:  MRI pending     Current Medications:     Infusions:   sodium chloride 0.9% 100 mL/hr at 09/03/20 1533        Scheduled:   atorvastatin  40 mg Oral QHS    cloNIDine  0.3 mg Oral TID    enoxaparin  40 mg Subcutaneous Q24H    famotidine  20 mg Oral BID    gabapentin  300 mg Oral BID    hydrALAZINE  100 mg Oral BID    insulin detemir U-100  10 Units Subcutaneous QHS    labetaloL  300 mg Oral Q12H    piperacillin-tazobactam (ZOSYN) IVPB  4.5 g Intravenous Q8H    vancomycin (VANCOCIN) IVPB  1,750 mg Intravenous Q12H        PRN:  cyclobenzaprine, dextrose 50%, dextrose 50%, dicyclomine, glucagon (human recombinant), glucose, glucose, insulin aspart U-100, ondansetron, sodium chloride 0.9%, sodium chloride 0.9%, traZODone    Antibiotics and Day Number of Therapy:  Vanc 9/3-current  Zosyn 9/3-current     Lines and Day Number of Therapy:  PIV    Assessment:     Nicolas Rock is a 45 y.o.male with diastolic heart failure, DM1, HTN, HLD and chronic foot wound who presents today with bilateral leg cellulitis and right hallux wound, also concern for bacteremia in the setting of IVDU.        Plan:     Right Hallux Wound  - s/p debridement with Podiatry 3 weeks ago, new purulent drainage   - CT foot showed no bony erosion, but osteomyelitis could not be ruled out  - no bone exposure seen on exam  - vanc and zosyn IV  - MRI foot to evaluate for osteo is pending   - if no osteo found, can consider discharge with po abx  - of osteo is found, follow ID recs, but likely need biopsy or emperic treatment with vanc for 6 weeks  - wound care consulted  - ID consulted      Bilateral lower leg cellulitis  - bilateral calor, dolor and swelling of lower legs of 1 week duration  - concern could have a component of CHF  - vanc of zosyn IV     Intravenous Drug Use  -Extensive history of IV drug use, last use  2 days ago.  -Blood cultures NGTD, HIV negative, HepC pending, TTE pending  -high risk of withdrawal, PRN trazodone 100mg, cyclobenzaprine 10mg, dicyclomine 20mg     Diastolic Heart Failure  - last ECHO was 2016 showing diastolic dysfunction with preserved ejection fraction  - will repeat ECHO on this admission to ensure maximal medical management  - currently on hydralazine, labetalol and clonidine   - Using lipid panel from 2018, ASCVD 10 year risk of 30.3% recommending high intensity statin  - Started on atorvastatin 40mg, will repeat ASCVD risk with repeat lipid panel  - TTE pending     Diabetes Mellitus, Type 1 with neuropathy   - cannot confirm type 1 vs type 2, but pt reports being diagnosed at age 10 and is insulin dependent   - pt reports sliding scale regular insulin with meals and 10 units of detemir nightly  -  HbgA1c is 7.0 at this admission   - will continue SSI and nightly basal 10 units   - will continue home gabapentin 300 BID     HTN  - BP on admission was 193/96  - home meds include hydralazine 100 BID, labetalol 300 BID, clonidine 0.3 TID  - will continue home meds     Chronic Intermediate Inguinal Lymphadenopathy  -Recurrent cycles of draining bilateral inguinal lymphadenopathy since 25 y.o.  -Inguinal lymph nodes currently tender and swollen  -monitor closely will consider ultrasound     Normocytic Anemia  -Admission H/H 9/27, 3 years ago 14.7/42.2  -Likely anemia of chronic disease due to elevated ferritin of 319, normocytic RBC's and pt denies hematochezia/melena  -Iron studies how low iron and TIBC, folate, and Vit B12 wnl  - will start iron supplementation       Proteinuria  -9/2 UA 3+ protein, likely 2/2 to longstanding DM.  -Renal function WNL, Cr .84, GFR >60  -Unclear if transient or chronic 2/2 DM, repeat UA tomorrow, microablumin/creatine ratio is elevated      Code Status:      Full code  Ppx: lovenox  Diet: diabetic      Dispo: evaluate for osteomyelitis, medically maximize heart  failure      Tino Marshall MD  [facility] Internal Medicine -III    [facility] Medicine Hospitalist Pager numbers:   [facility] Hospitalist Medicine Team A (Cindy/Chhaya): [phone]  [facility] Hospitalist Medicine Team B (Kentrell/Nehemias):  [phone]

## 2020-09-04 NOTE — PLAN OF CARE
Patient sitting at edge of bed at this time. No S/S of pain or discomfort noted at this time. NADN. Verbal, able to make needs known. Patient requires extra prompting to reply at times. Up ad ping. No diabetic distress noted at this time. No telemetry monitoring ordered. Plan of care and medications reviewed with patient- verbalizes understanding. Bed in lowest position, side rails up x 2, call light within reach. Will endorse patient to oncoming nurse.

## 2020-09-04 NOTE — PROGRESS NOTES
Pharmacokinetic Assessment Follow Up: IV Vancomycin    Vancomycin serum concentration assessment(s):    The trough level was drawn correctly and can be used to guide therapy at this time. The measurement is within the desired definitive target range of 15 to 20 mcg/mL.    Vancomycin Regimen Plan:    Continue regimen to Vancomycin 1750 mg IV every 12 hours with next serum trough concentration measured at 2130 prior to 4th dose on 9/5    Drug levels (last 3 results):  Recent Labs   Lab Result Units 09/04/20  0919   Vancomycin-Trough ug/mL 17.5       Pharmacy will continue to follow and monitor vancomycin.    Please contact pharmacy at extension [phone] for questions regarding this assessment.    Thank you for the consult,   Jairon Rasmussen       Patient brief summary:  Nicolas Rock is a 45 y.o. male initiated on antimicrobial therapy with IV Vancomycin for treatment of skin & soft tissue infection, possible osteo    The patient's current regimen is vancomycin 1750mg q12h    Drug Allergies:   Review of patient's allergies indicates:   Allergen Reactions    Lisinopril Hives     Angioedema        Actual Body Weight:   108kg    Renal Function:   Estimated Creatinine Clearance: 119.5 mL/min (based on SCr of 1 mg/dL).,     Dialysis Method (if applicable):      CBC (last 72 hours):  Recent Labs   Lab Result Units 09/02/20 2252 09/03/20 1325 09/04/20  0613   WBC K/uL 12.43 10.27 11.09   Hemoglobin g/dL 9.1* 9.0* 9.4*   Hemoglobin A1C %  --  7.0*  --    Hematocrit % 27.5* 27.0* 28.8*   Platelets K/uL 301 SEE COMMENT 309   Gran% % 62.4 61.5 53.2   Lymph% % 17.7* 16.2* 21.6   Mono% % 15.4* 14.9 17.5*   Eosinophil% % 3.5 6.3 6.7   Basophil% % 0.6 0.7 0.7   Differential Method  Automated Automated Automated       Metabolic Panel (last 72 hours):  Recent Labs   Lab Result Units 09/02/20 2252 09/02/20 2253 09/03/20 1325 09/04/20  0135 09/04/20  0613 09/04/20  1302   Sodium mmol/L 132*  --  128*  --  132*  132* 131*   Potassium  mmol/L 3.9  --  3.8  --  3.9  3.9 3.8   Chloride mmol/L 98  --  94*  --  98  98 97   CO2 mmol/L 28  --  25  --  25  25 24   Glucose mg/dL 137*  --  175*  --  131*  131* 120*   Glucose, UA   --  Negative  --   --   --   --    BUN, Bld mg/dL 16  --  16  --  19  19 20   Creatinine mg/dL 0.84  --  0.9  --  1.1  1.1 1.0   Creatinine, Random Ur mg/dL  --  259.0  --  88.0  --   --    Albumin g/dL 3.3*  --   --   --  2.6*  --    Total Bilirubin mg/dL 0.6  --   --   --  0.4  --    Alkaline Phosphatase U/L 104  --   --   --  111  --    AST U/L 28  --   --   --  19  --    ALT U/L 17  --   --   --  17  --    Magnesium mg/dL  --   --   --   --  1.8  --    Phosphorus mg/dL  --   --   --   --  4.4  --        Vancomycin Administrations:  vancomycin given in the last 96 hours                     vancomycin (VANCOCIN) 1,750 mg in dextrose 5 % 500 mL IVPB (mg) 1,750 mg New Bag 09/04/20 1043     1,750 mg New Bag 09/03/20 2136     1,750 mg New Bag  1119    vancomycin (VANCOCIN) 2,000 mg in dextrose 5 % 500 mL IVPB (mg) 2,000 mg New Bag 09/02/20 2230                    Microbiologic Results:  Microbiology Results (last 7 days)       Procedure Component Value Units Date/Time    Blood culture [[id number]] Collected: 09/02/20 2338    Order Status: Completed Specimen: Blood from Peripheral, Antecubital, Right Updated: 09/04/20 1412     Blood Culture, Routine No Growth to date      No Growth to date    Blood culture [[id number]] Collected: 09/02/20 2245    Order Status: Completed Specimen: Blood from Peripheral, Wrist, Left Updated: 09/04/20 1412     Blood Culture, Routine No Growth to date      No Growth to date

## 2020-09-04 NOTE — MEDICAL/APP STUDENT
U  Medical Student Progress Note    Subjective:      Pt slept well overnight without any events. States he feels the swelling in his legs is decreasing. Denies any nausea, vomiting, or shortness of breath.     Objective:       Last 24 Hour Vital Signs:  BP  Min: 132/75  Max: 174/88  Temp  Av °F (36.7 °C)  Min: 97 °F (36.1 °C)  Max: 99.6 °F (37.6 °C)  Pulse  Av.2  Min: 71  Max: 85  Resp  Av  Min: 18  Max: 18  SpO2  Av.9 %  Min: 95 %  Max: 99 %  I/O last 3 completed shifts:  In: 1140 [P.O.:490; IV Piggyback:650]  Out: 2000 [Urine:2000]    Physical Examination:    Physical Exam   Constitutional: He is oriented to person, place, and time. Mildly Uncomfortable.   HENT:   Head: Atraumatic.   Right Ear: External ear normal.   Left Ear: External ear normal.   Eyes: Conjunctivae are normal. Right eye exhibits no discharge. Left eye exhibits no discharge. No scleral icterus.   Neck: Neck supple. No thyromegaly present.   Cardiovascular: Normal heart sounds. Exam reveals no friction rub.   No murmur heard.  Pulmonary/Chest: Effort normal and breath sounds normal. He has no wheezes. He has no rales.   Abdominal: Soft. He exhibits no distension. There is no abdominal tenderness.   Genitourinary: Penis normal. Bilateral tender inguinal lymphadenopathy.One 2-3cm lymph node in each inguinal crease, both are soft and mobile. The Right inguinal lymph node is surrounded by a 10cm area of edematous tissue. No drainage, sinus tracts, and overlying skin is intact.  Musculoskeletal:  Bilateral leg swelling from foot until distal thigh. Skin is shiny, warm and intact over the lower legs except for the right hallux.  Feet are dry, rough, and swollen.  Right foot is covered in bandage  Neurological: He is alert and oriented to person, place, and time. He displays normal reflexes    Laboratory:  Laboratory Data Reviewed: yes  Pertinent Findings:  Trended Lab Data:  Recent Labs   Lab 20  2252 20  1325  09/04/20  0613   WBC 12.43 10.27 11.09   HGB 9.1* 9.0* 9.4*   HCT 27.5* 27.0* 28.8*    SEE COMMENT 309   MCV 87 87 88   RDW 13.6 13.9 13.8   * 128* 132*  132*   K 3.9 3.8 3.9  3.9   CL 98 94* 98  98   CO2 28 25 25  25   BUN 16 16 19  19   CREATININE 0.84 0.9 1.1  1.1   * 175* 131*  131*   PROT 7.6  --  7.7   ALBUMIN 3.3*  --  2.6*   BILITOT 0.6  --  0.4   AST 28  --  19   ALKPHOS 104  --  111   ALT 17  --  17         Microbiology Data Reviewed: yes  Pertinent Findings:  9/2 Blood culture x2- pending    Other Results:  9/2 CT foot w contrast-evidence of R foot cellulitis, no abscess or evidence of osseous erosion        Current Medications:     Infusions:   sodium chloride 0.9% 100 mL/hr at 09/03/20 1533        Scheduled:   atorvastatin  40 mg Oral QHS    cloNIDine  0.3 mg Oral TID    enoxaparin  40 mg Subcutaneous Q24H    famotidine  20 mg Oral BID    gabapentin  300 mg Oral BID    hydrALAZINE  100 mg Oral BID    insulin detemir U-100  10 Units Subcutaneous QHS    labetaloL  300 mg Oral Q12H    piperacillin-tazobactam (ZOSYN) IVPB  4.5 g Intravenous Q8H    vancomycin (VANCOCIN) IVPB  1,750 mg Intravenous Q12H        PRN:  cyclobenzaprine, dextrose 50%, dextrose 50%, dicyclomine, glucagon (human recombinant), glucose, glucose, insulin aspart U-100, ondansetron, sodium chloride 0.9%, sodium chloride 0.9%, traZODone    Antibiotics and Day Number of Therapy:  Vanc/Zosyn Day 2        Assessment/Plan:     Nicolas Rock is a 45 y.o.male with a pmh of DM, HTN, CHF, and IVDA here with bilateral lower leg cellulitis, right hallux wound, and bilateral inguinal lympadenopathy currently on IV vanc/zosyn.    Right Hallux Wound  -3 year history of chronic foot wounds 2/2 to 35 year history of DM  -9/2 CT contrast of R foot indicates cellulitis of right hallux  -Moderate clinical suspicion for osteomyelitis, MRI w/ contrast pending   -9/2 Blood x2 pending  -Day 2 of empiric Vanc/Zosyn, will  reevaluate after blood culture results return  -Consult sent to wound care     Bilateral Lower Leg Cellulitis  -Warm, swollen bilateral lower extremity likely due to cellulitis, afebrile  -Potential CHF component, TTE pending  -Blood cultures x2 pending, treating with empiric abx  -Wound care consult     Intravenous Drug Use  -Extensive history of IV drug use, last use 2 days ago.  -Blood cultures, HIV, HepC pending,  -high risk of withdrawal, PRN trazodone 100mg, cyclobenzaprine 10mg, dicyclomine 20mg    Chronic Congestive Heart Failure, Diastolic Dysfunction  -Echo in Nov 2016 EF 55-60 with LV diastolic dysfunction, mild mitral regurg  -Current ASCVD 10 year risk of 18.6% recommending high intensity statin  -Started on atorvastatin 40mg    Normocytic Anemia  -Admission H/H 9/27, 3 years ago 14.7/42.2  -Likely anemia of chronic disease due to elevated ferritin of 319, normal B12, and folate normocytic RBC's and pt denies hematochezia/melena  -Continue to monitor, current HH 9.4/28.8     Proteinuria  -9/2 UA 3+ protein, likely 2/2 to longstanding DM.  -Renal function WNL, Cr .84, GFR >60  -microalbumin:creatinine ratio 629, serum albumin down trending 3.3->2.6 likely dilutional from fluid administation  -Consider renal consult     Chronic Hypertension  -176/89 on admission  -Continuing on home meds of amlodipine 10mg, clonidine .3mg TID, hydralazine 100mg BID  -Most recent pressure 157/85,  will continue to monitor         Chronic Intermediate Inguinal Lymphadenopathy  -Recurrent cycles of draining bilateral inguinal lymphadenopathy since 25 y.o.  -Inguinal lymph nodes currently tender and swollen  -monitor closely will consider ultrasound     DM I  -Diabetic since 10 years old, 9/3 A1C 7.0  -Home meds: Sliding scale insulin, 10 units levimir  -Continue home sliding scale insulin    Hyponatremia  -admission Na 132 further decreased to 128 (9/2)  -Currently, 132 resonded to normal saline  -Pt is asymptomatic,  repleting with .9% NaCl infusion      Code: Full  DVT ppx:Enoxaparin  Diet: Diabetic  Dispo: Discharge pending resolution of cellulitis      Shay Lr  [facility] MS4  [facility] IM Service Team A    [facility] Medicine Hospitalist Pager numbers:   [facility] Hospitalist Medicine Team A (Cindy/Chhaya): [phone]  [facility] Hospitalist Medicine Team B (Kentrell/Nehemias):  [phone]

## 2020-09-05 VITALS
DIASTOLIC BLOOD PRESSURE: 85 MMHG | BODY MASS INDEX: 30.54 KG/M2 | SYSTOLIC BLOOD PRESSURE: 178 MMHG | HEIGHT: 74 IN | HEART RATE: 100 BPM | OXYGEN SATURATION: 90 % | WEIGHT: 238 LBS | RESPIRATION RATE: 22 BRPM | TEMPERATURE: 100 F

## 2020-09-05 PROBLEM — M86.9 OSTEOMYELITIS OF RIGHT FOOT: Status: ACTIVE | Noted: 2020-09-05

## 2020-09-05 PROBLEM — M86.471 CHRONIC OSTEOMYELITIS OF RIGHT FOOT WITH DRAINING SINUS: Status: RESOLVED | Noted: 2020-09-05 | Resolved: 2020-09-05

## 2020-09-05 PROBLEM — M86.471 CHRONIC OSTEOMYELITIS OF RIGHT FOOT WITH DRAINING SINUS: Status: ACTIVE | Noted: 2020-09-05

## 2020-09-05 LAB
ALBUMIN SERPL BCP-MCNC: 2.4 G/DL (ref 3.5–5.2)
ALP SERPL-CCNC: 112 U/L (ref 55–135)
ALT SERPL W/O P-5'-P-CCNC: 18 U/L (ref 10–44)
ANION GAP SERPL CALC-SCNC: 9 MMOL/L (ref 8–16)
AORTIC ROOT ANNULUS: 3.7 CM
ASCENDING AORTA: 2.97 CM
AST SERPL-CCNC: 21 U/L (ref 10–40)
AV INDEX (PROSTH): 0.8
AV MEAN GRADIENT: 6 MMHG
AV PEAK GRADIENT: 9 MMHG
AV VALVE AREA: 3.87 CM2
AV VELOCITY RATIO: 0.72
BASOPHILS # BLD AUTO: 0.09 K/UL (ref 0–0.2)
BASOPHILS NFR BLD: 0.9 % (ref 0–1.9)
BILIRUB SERPL-MCNC: 0.3 MG/DL (ref 0.1–1)
BSA FOR ECHO PROCEDURE: 2.37 M2
BUN SERPL-MCNC: 20 MG/DL (ref 6–20)
CALCIUM SERPL-MCNC: 8.2 MG/DL (ref 8.7–10.5)
CHLORIDE SERPL-SCNC: 100 MMOL/L (ref 95–110)
CO2 SERPL-SCNC: 23 MMOL/L (ref 23–29)
CREAT SERPL-MCNC: 1.1 MG/DL (ref 0.5–1.4)
CV ECHO LV RWT: 0.66 CM
DIFFERENTIAL METHOD: ABNORMAL
DOP CALC AO PEAK VEL: 1.51 M/S
DOP CALC AO VTI: 28.65 CM
DOP CALC LVOT AREA: 4.8 CM2
DOP CALC LVOT DIAMETER: 2.48 CM
DOP CALC LVOT PEAK VEL: 1.08 M/S
DOP CALC LVOT STROKE VOLUME: 111 CM3
DOP CALCLVOT PEAK VEL VTI: 22.99 CM
E WAVE DECELERATION TIME: 191.44 MSEC
E/A RATIO: 2.27
E/E' RATIO: 16.93 M/S
ECHO LV POSTERIOR WALL: 1.69 CM (ref 0.6–1.1)
EOSINOPHIL # BLD AUTO: 0.7 K/UL (ref 0–0.5)
EOSINOPHIL NFR BLD: 7.1 % (ref 0–8)
ERYTHROCYTE [DISTWIDTH] IN BLOOD BY AUTOMATED COUNT: 13.9 % (ref 11.5–14.5)
EST. GFR  (AFRICAN AMERICAN): >60 ML/MIN/1.73 M^2
EST. GFR  (NON AFRICAN AMERICAN): >60 ML/MIN/1.73 M^2
FRACTIONAL SHORTENING: 32 % (ref 28–44)
GLUCOSE SERPL-MCNC: 169 MG/DL (ref 70–110)
HCT VFR BLD AUTO: 25.8 % (ref 40–54)
HGB BLD-MCNC: 8.6 G/DL (ref 14–18)
IMM GRANULOCYTES # BLD AUTO: 0.03 K/UL (ref 0–0.04)
IMM GRANULOCYTES NFR BLD AUTO: 0.3 % (ref 0–0.5)
INTERVENTRICULAR SEPTUM: 1.91 CM (ref 0.6–1.1)
LA MAJOR: 5.97 CM
LA MINOR: 5.82 CM
LA WIDTH: 5.17 CM
LEFT ATRIUM SIZE: 4.53 CM
LEFT ATRIUM VOLUME INDEX: 50.1 ML/M2
LEFT ATRIUM VOLUME: 117.33 CM3
LEFT INTERNAL DIMENSION IN SYSTOLE: 3.49 CM (ref 2.1–4)
LEFT VENTRICLE DIASTOLIC VOLUME INDEX: 54.37 ML/M2
LEFT VENTRICLE DIASTOLIC VOLUME: 127.22 ML
LEFT VENTRICLE MASS INDEX: 190 G/M2
LEFT VENTRICLE SYSTOLIC VOLUME INDEX: 21.6 ML/M2
LEFT VENTRICLE SYSTOLIC VOLUME: 50.64 ML
LEFT VENTRICULAR INTERNAL DIMENSION IN DIASTOLE: 5.16 CM (ref 3.5–6)
LEFT VENTRICULAR MASS: 445.58 G
LV LATERAL E/E' RATIO: 15.88 M/S
LV SEPTAL E/E' RATIO: 18.14 M/S
LYMPHOCYTES # BLD AUTO: 2.2 K/UL (ref 1–4.8)
LYMPHOCYTES NFR BLD: 22.7 % (ref 18–48)
MCH RBC QN AUTO: 29.1 PG (ref 27–31)
MCHC RBC AUTO-ENTMCNC: 33.3 G/DL (ref 32–36)
MCV RBC AUTO: 87 FL (ref 82–98)
MONOCYTES # BLD AUTO: 1.8 K/UL (ref 0.3–1)
MONOCYTES NFR BLD: 19.3 % (ref 4–15)
MV PEAK A VEL: 0.56 M/S
MV PEAK E VEL: 1.27 M/S
MV STENOSIS PRESSURE HALF TIME: 55.52 MS
MV VALVE AREA P 1/2 METHOD: 3.96 CM2
NEUTROPHILS # BLD AUTO: 4.7 K/UL (ref 1.8–7.7)
NEUTROPHILS NFR BLD: 49.7 % (ref 38–73)
NRBC BLD-RTO: 0 /100 WBC
PISA TR MAX VEL: 3.15 M/S
PLATELET # BLD AUTO: 300 K/UL (ref 150–350)
PMV BLD AUTO: 9.2 FL (ref 9.2–12.9)
POCT GLUCOSE: 145 MG/DL (ref 70–110)
POCT GLUCOSE: 149 MG/DL (ref 70–110)
POCT GLUCOSE: 189 MG/DL (ref 70–110)
POTASSIUM SERPL-SCNC: 4.1 MMOL/L (ref 3.5–5.1)
PROT SERPL-MCNC: 7.3 G/DL (ref 6–8.4)
PULM VEIN S/D RATIO: 0.76
PV PEAK D VEL: 1.06 M/S
PV PEAK S VEL: 0.81 M/S
PV PEAK VELOCITY: 1.34 CM/S
RA MAJOR: 4.94 CM
RA PRESSURE: 8 MMHG
RA WIDTH: 3.87 CM
RBC # BLD AUTO: 2.96 M/UL (ref 4.6–6.2)
SODIUM SERPL-SCNC: 132 MMOL/L (ref 136–145)
STJ: 2.66 CM
TDI LATERAL: 0.08 M/S
TDI SEPTAL: 0.07 M/S
TDI: 0.08 M/S
TR MAX PG: 40 MMHG
TRICUSPID ANNULAR PLANE SYSTOLIC EXCURSION: 2.59 CM
TV REST PULMONARY ARTERY PRESSURE: 48 MMHG
WBC # BLD AUTO: 9.48 K/UL (ref 3.9–12.7)

## 2020-09-05 PROCEDURE — 25000003 PHARM REV CODE 250: Performed by: STUDENT IN AN ORGANIZED HEALTH CARE EDUCATION/TRAINING PROGRAM

## 2020-09-05 PROCEDURE — 96372 THER/PROPH/DIAG INJ SC/IM: CPT | Mod: 59 | Performed by: EMERGENCY MEDICINE

## 2020-09-05 PROCEDURE — 99223 PR INITIAL HOSPITAL CARE,LEVL III: ICD-10-PCS | Mod: ,,, | Performed by: PODIATRIST

## 2020-09-05 PROCEDURE — 63600175 PHARM REV CODE 636 W HCPCS: Performed by: STUDENT IN AN ORGANIZED HEALTH CARE EDUCATION/TRAINING PROGRAM

## 2020-09-05 PROCEDURE — 99223 1ST HOSP IP/OBS HIGH 75: CPT | Mod: ,,, | Performed by: PODIATRIST

## 2020-09-05 PROCEDURE — 21400001 HC TELEMETRY ROOM

## 2020-09-05 PROCEDURE — 96361 HYDRATE IV INFUSION ADD-ON: CPT | Mod: 59 | Performed by: EMERGENCY MEDICINE

## 2020-09-05 PROCEDURE — A9585 GADOBUTROL INJECTION: HCPCS | Performed by: INTERNAL MEDICINE

## 2020-09-05 PROCEDURE — 85025 COMPLETE CBC W/AUTO DIFF WBC: CPT

## 2020-09-05 PROCEDURE — 36415 COLL VENOUS BLD VENIPUNCTURE: CPT

## 2020-09-05 PROCEDURE — 96376 TX/PRO/DX INJ SAME DRUG ADON: CPT | Mod: 59 | Performed by: EMERGENCY MEDICINE

## 2020-09-05 PROCEDURE — 25500020 PHARM REV CODE 255: Performed by: INTERNAL MEDICINE

## 2020-09-05 PROCEDURE — 80053 COMPREHEN METABOLIC PANEL: CPT

## 2020-09-05 RX ORDER — LOSARTAN POTASSIUM 25 MG/1
25 TABLET ORAL DAILY
Status: DISCONTINUED | OUTPATIENT
Start: 2020-09-05 | End: 2020-09-06 | Stop reason: HOSPADM

## 2020-09-05 RX ORDER — QUETIAPINE FUMARATE 200 MG/1
200 TABLET, FILM COATED ORAL NIGHTLY
Status: ON HOLD | COMMUNITY
End: 2021-01-01 | Stop reason: HOSPADM

## 2020-09-05 RX ORDER — DOXYCYCLINE 100 MG/1
100 CAPSULE ORAL 2 TIMES DAILY
Qty: 84 CAPSULE | Refills: 0 | Status: SHIPPED | OUTPATIENT
Start: 2020-09-05 | End: 2020-10-17

## 2020-09-05 RX ORDER — HYDRALAZINE HYDROCHLORIDE 25 MG/1
100 TABLET, FILM COATED ORAL EVERY 8 HOURS
Status: DISCONTINUED | OUTPATIENT
Start: 2020-09-05 | End: 2020-09-06 | Stop reason: HOSPADM

## 2020-09-05 RX ORDER — OMEPRAZOLE 20 MG/1
20 CAPSULE, DELAYED RELEASE ORAL NIGHTLY
COMMUNITY

## 2020-09-05 RX ORDER — AMOXICILLIN AND CLAVULANATE POTASSIUM 875; 125 MG/1; MG/1
1 TABLET, FILM COATED ORAL EVERY 12 HOURS
Qty: 84 TABLET | Refills: 0 | Status: SHIPPED | OUTPATIENT
Start: 2020-09-05 | End: 2020-10-17

## 2020-09-05 RX ORDER — GADOBUTROL 604.72 MG/ML
10 INJECTION INTRAVENOUS
Status: COMPLETED | OUTPATIENT
Start: 2020-09-05 | End: 2020-09-05

## 2020-09-05 RX ORDER — DOXYCYCLINE 100 MG/1
100 CAPSULE ORAL 2 TIMES DAILY
Status: ON HOLD | COMMUNITY
End: 2021-01-01 | Stop reason: HOSPADM

## 2020-09-05 RX ADMIN — LOSARTAN POTASSIUM 25 MG: 25 TABLET, FILM COATED ORAL at 01:09

## 2020-09-05 RX ADMIN — METRONIDAZOLE 500 MG: 500 TABLET ORAL at 05:09

## 2020-09-05 RX ADMIN — GABAPENTIN 300 MG: 300 CAPSULE ORAL at 08:09

## 2020-09-05 RX ADMIN — CLONIDINE HYDROCHLORIDE 0.3 MG: 0.1 TABLET ORAL at 08:09

## 2020-09-05 RX ADMIN — INSULIN ASPART 2 UNITS: 100 INJECTION, SOLUTION INTRAVENOUS; SUBCUTANEOUS at 08:09

## 2020-09-05 RX ADMIN — METOPROLOL SUCCINATE 25 MG: 25 TABLET, EXTENDED RELEASE ORAL at 08:09

## 2020-09-05 RX ADMIN — HYDRALAZINE HYDROCHLORIDE 100 MG: 25 TABLET, FILM COATED ORAL at 08:09

## 2020-09-05 RX ADMIN — GADOBUTROL 10 ML: 604.72 INJECTION INTRAVENOUS at 01:09

## 2020-09-05 RX ADMIN — HYDRALAZINE HYDROCHLORIDE 100 MG: 25 TABLET, FILM COATED ORAL at 01:09

## 2020-09-05 RX ADMIN — FERROUS SULFATE TAB EC 325 MG (65 MG FE EQUIVALENT) 325 MG: 325 (65 FE) TABLET DELAYED RESPONSE at 08:09

## 2020-09-05 RX ADMIN — FAMOTIDINE 20 MG: 20 TABLET ORAL at 08:09

## 2020-09-05 RX ADMIN — VANCOMYCIN HYDROCHLORIDE 1750 MG: 100 INJECTION, POWDER, LYOPHILIZED, FOR SOLUTION INTRAVENOUS at 01:09

## 2020-09-05 RX ADMIN — CLONIDINE HYDROCHLORIDE 0.3 MG: 0.1 TABLET ORAL at 03:09

## 2020-09-05 RX ADMIN — METRONIDAZOLE 500 MG: 500 TABLET ORAL at 01:09

## 2020-09-05 RX ADMIN — CEFEPIME HYDROCHLORIDE 2 G: 2 INJECTION, SOLUTION INTRAVENOUS at 02:09

## 2020-09-05 RX ADMIN — SODIUM CHLORIDE: 0.9 INJECTION, SOLUTION INTRAVENOUS at 01:09

## 2020-09-05 NOTE — NURSING
"Dr. Ash in with patient for consult- came out and notified nurse that patient could not converse with doctor about diagnoses due to falling asleep. Nurse went into patient's room to assess patient and patient noted to be in restroom with door closed. After approximately 20-25 minutes, it was noted that patient had not returned to bed from restroom. Nurse went back into patient's room and knocked on restroom door, patient did not immediately reply. Nurse knocked again, patient opened door and stated that he "had fallen asleep while standing up and urinating." Nurse asked patient when the last time he had taken his Seroquel and replied the night before prior to his medications being confiscated by security. Dr. Naranjo paged to come speak with patient and asked patient if he would be okay with searching his room and his person. Patient seemed reluctant but agreeable. Charge nurse, Livia Mathews; House supervisor, Merlyn; and security notified and dispatched.     Nurse notified patient that security would be up shortly and that healthcare team was concerned about his safety. Patient attempted to shut door while nurse was standing in doorway. Nurse asked patient to keep door open until security arrives. Patient became defensive and repeatedly stated "Man, I take three Percs a day." Nurse inquired if patient had those medications on his person and if so, the medications could be locked up in security and his home pain medication regimen could be followed while an inpatient at [facility].     After security's arrival, patient stated that he no longer wanted to stay in hospital and that he "refused to be treated this way." Nurse informed patient that search is only being done because of his inability to hold conversations and stay awake.  At this point, room search had ceased and Dr. Naranjo was paged to patient's room.     Dr. Naranjo advised patient of risk factors if he were to leave hospital and patient continued to " insist that he wanted to leave.     22 gauge PIV to left wrist removed without difficulty, catheter intact. No telemetry monitor to remove. AMA signed by Dr. Naranjo, this nurse, and charge nurse. Patient escorted to elevators by security. HECTOR at time of departure.

## 2020-09-05 NOTE — HPI
44 yo M with Hx of DM, HLD, HTN, diastolic heart failure, heroin and opioid addiction admitted for right 1st digit wound. Patient reports history of right foot calluses for past 3 years which were being managed by Luiz Woods DPM, at some point within the past 3 years he believes one of these calluses developed into an ulcer on the R 1st digit but he is unsure when. He was seen by Lacie Iabnez DPM 03/16/20 and had no open wounds at that time. Over the past week he reports drainage of white-yellow fluid, worsening foul odor, no pain. He reports last being seen by Dr Woods 4 days ago and at that point he was instructed to report to the ED for worsening wound infection. Went to [facility] and was transferred to [facility]. Today patient denies pain at his right 1st digit.

## 2020-09-05 NOTE — CONSULTS
[facility]  Podiatry  Consult Note    Patient Name: Nicolas Rock  MRN: [id number]  Admission Date: 9/2/2020  Hospital Length of Stay: 0 days  Attending Physician: Vipin White MD  Primary Care Provider: Efrain Berman Jr, MD     Inpatient consult to Podiatry  Consult performed by: Salvatore Ash MD  Consult ordered by: Raheem Naranjo MD        Subjective:     History of Present Illness:  46 yo M with Hx of DM, HLD, HTN, diastolic heart failure, heroin and opioid addiction admitted for right 1st digit wound. Patient reports history of right foot calluses for past 3 years which were being managed by Luiz Woods DPM, at some point within the past 3 years he believes one of these calluses developed into an ulcer on the R 1st digit but he is unsure when. He was seen by Lacie Ibanez DPM 03/16/20 and had no open wounds at that time. Over the past week he reports drainage of white-yellow fluid, worsening foul odor, no pain. He reports last being seen by Dr Woods 4 days ago and at that point he was instructed to report to the ED for worsening wound infection. Went to [facility] and was transferred to [facility]. Today patient denies pain at his right 1st digit.     Scheduled Meds:   atorvastatin  40 mg Oral QHS    ceFEPime (MAXIPIME) IVPB  2 g Intravenous Q8H    cloNIDine  0.3 mg Oral TID    enoxaparin  40 mg Subcutaneous Q24H    famotidine  20 mg Oral BID    ferrous sulfate  325 mg Oral Daily    gabapentin  300 mg Oral BID    hydrALAZINE  100 mg Oral Q8H    insulin detemir U-100  10 Units Subcutaneous QHS    losartan  25 mg Oral Daily    metoprolol succinate  25 mg Oral Daily    metroNIDAZOLE  500 mg Oral Q8H    vancomycin (VANCOCIN) IVPB  1,750 mg Intravenous Q12H     Continuous Infusions:   sodium chloride 0.9% 100 mL/hr at 09/05/20 1301     PRN Meds:cyclobenzaprine, dextrose 50%, dextrose 50%, dicyclomine, glucagon (human recombinant), glucose, glucose, insulin  aspart U-100, ondansetron, sodium chloride 0.9%, sodium chloride 0.9%, traZODone    Review of patient's allergies indicates:   Allergen Reactions    Lisinopril Hives     Angioedema         Past Medical History:   Diagnosis Date    Chronic diastolic heart failure     Diabetes mellitus     HLD (hyperlipidemia)     Hypertension      Past Surgical History:   Procedure Laterality Date    COLONOSCOPY Left 6/28/2016    Procedure: COLONOSCOPY;  Surgeon: ONEYDA Mckeon MD;  Location: [facility];  Service: Endoscopy;  Laterality: Left;  prostate exam         Family History     Problem Relation (Age of Onset)    Cancer Maternal Uncle    Diabetes Mother    Heart attack Maternal Grandfather    Hypertension Mother    Kidney disease Mother        Tobacco Use    Smoking status: Current Some Day Smoker     Packs/day: 0.50     Years: 33.00     Pack years: 16.50     Types: Cigarettes     Start date: 1987    Smokeless tobacco: Never Used    Tobacco comment:  Pt is enrolled in the [employer]. Ambulatory referral to Smoking Cessation program. Currently, chews Nicorette gum.   Substance and Sexual Activity    Alcohol use: No    Drug use: No    Sexual activity: Yes     Partners: Female     Review of Systems   Constitutional: Positive for chills. Negative for fever.   HENT: Negative for ear pain and sneezing.    Eyes: Negative for pain.   Respiratory: Negative for cough and shortness of breath.    Cardiovascular: Positive for leg swelling. Negative for chest pain.   Gastrointestinal: Negative for constipation, diarrhea, nausea and vomiting.   Genitourinary: Positive for frequency. Negative for flank pain.   Musculoskeletal: Positive for gait problem and joint swelling.   Skin: Positive for color change and wound.   Neurological: Positive for numbness. Negative for headaches.   Psychiatric/Behavioral: Negative for agitation and confusion.     Objective:     Vital Signs (Most Recent):  Temp: 97.3 °F (36.3 °C) (09/05/20  1316)  Pulse: 100 (09/05/20 1316)  Resp: (!) 22 (09/05/20 1316)  BP: (!) 196/99 (09/05/20 1316)  SpO2: (!) 92 % (09/05/20 1316) Vital Signs (24h Range):  Temp:  [97.3 °F (36.3 °C)-99.6 °F (37.6 °C)] 97.3 °F (36.3 °C)  Pulse:  [] 100  Resp:  [19-22] 22  SpO2:  [92 %-100 %] 92 %  BP: (156-196)/(80-99) 196/99     Weight: 108 kg (238 lb)  Body mass index is 30.56 kg/m².    Foot Exam    General  Orientation: alert and oriented to person, place, and time   Affect: appropriate       Right Foot/Ankle     Inspection and Palpation  Ecchymosis: none  Tenderness: none   Swelling: (1st digit)  Skin Exam: drainage, dry skin, skin changes, abnormal color, ulcer and erythema;     Neurovascular  Dorsalis pedis: absent  Posterior tibial: absent  Saphenous nerve sensation: diminished  Tibial nerve sensation: diminished  Superficial peroneal nerve sensation: diminished  Deep peroneal nerve sensation: diminished  Sural nerve sensation: diminished    Muscle Strength  Ankle dorsiflexion: 4  Ankle plantar flexion: 4      Left Foot/Ankle      Inspection and Palpation  Ecchymosis: none  Tenderness: none   Swelling: none   Skin Exam: dry skin;     Neurovascular  Dorsalis pedis: absent  Posterior tibial: absent  Saphenous nerve sensation: diminished  Tibial nerve sensation: diminished  Superficial peroneal nerve sensation: diminished  Deep peroneal nerve sensation: diminished  Sural nerve sensation: diminished    Muscle Strength  Ankle dorsiflexion: 4  Ankle plantar flexion: 4            Laboratory:  Blood Cultures: No results for input(s): LABBLOO in the last 48 hours.  CBC:   Recent Labs   Lab 09/05/20  0741   WBC 9.48   RBC 2.96*   HGB 8.6*   HCT 25.8*      MCV 87   MCH 29.1   MCHC 33.3     CMP:   Recent Labs   Lab 09/05/20  0741   *   CALCIUM 8.2*   ALBUMIN 2.4*   PROT 7.3   *   K 4.1   CO2 23      BUN 20   CREATININE 1.1   ALKPHOS 112   ALT 18   AST 21   BILITOT 0.3     CRP: No results for input(s): CRP in  the last 168 hours.  ESR: No results for input(s): SEDRATE in the last 168 hours.  Wound Cultures: No results for input(s): LABAERO in the last 4320 hours.  Microbiology Results (last 7 days)     Procedure Component Value Units Date/Time    Blood culture [[id number]] Collected: 09/02/20 2245    Order Status: Completed Specimen: Blood from Peripheral, Wrist, Left Updated: 09/05/20 1412     Blood Culture, Routine No Growth to date      No Growth to date      No Growth to date    Blood culture [[id number]] Collected: 09/02/20 2338    Order Status: Completed Specimen: Blood from Peripheral, Antecubital, Right Updated: 09/05/20 1412     Blood Culture, Routine No Growth to date      No Growth to date      No Growth to date        Specimen (12h ago, onward)    None          Diagnostic Results  CT Right Foot 9/3: Diffuse subcutaneous edema throughout the right foot suspicious for cellulitis.  No appreciable rim enhancing fluid collection to suggest abscess.  No soft tissue gas or foreign body.  Advanced atherosclerotic calcification.  Mild degenerative changes of the dorsal midfoot.  Joint spaces otherwise appear well maintained.  No evidence of osseous erosion.  If there is concern for osteomyelitis, consider further evaluation with right foot MRI without and with IV contrast.   MRI Right Foot 9/5: Great toe inflammatory changes as above including osteomyelitis changes of the distal phalanx and distal aspect of the proximal phalanx.  Likely reactive edema changes with mid and proximal aspect of the 1st proximal phalanx although osteomyelitis not excluded either.   US Lower Extremities 9/5: No evidence of occlusion with generalized increased velocities throughout the lower extremities greater on the right.  Diffuse atherosclerotic plaquing, most focal at the proximal right SFA which demonstrates focal increase in velocity suggesting 50-75% stenosis. Bilateral subcutaneous edema present more present distally.     Clinical  Findings  Tavera grade 3 wound at right distal 1st digit 1.5x0.7 cm at surface, probes to bone and probes up to 2.5 cm along plantar surface of bone. Periwound macerated. Yellow-red fluid present on dressings that were removed but no fluid could be expressed from wound on manual compression. Foul odor. Not tender. Entire digit swollen. Entire foot warm.                 Assessment/Plan:     Osteomyelitis of right foot  A: Osteomyelitis of 1st distal and proximal phalanges of the right foot in diabetic patient based on probe to bone test and MRI. WBCs WNL, procalcitonin WNL. Patient tachycardic, tachypneic, with low oxygen saturation today though patient was comfortable and not distressed in room, moving sensors on body during visit which may account for some abnormal readings. SFA stenosis on vascular study. Infectious disease and interventional cardiology on board. Of note, patient was seen to fall asleep mid-conversation multiple times and fell asleep while standing urinating per nursing.     P:   -Antibiotics per ID.  -Discussed osteomyelitis treatment options to include 1st digit amputation vs 6 weeks IV antibiotics. Patient not amenable to amputation at this time, he is willing to discuss this again tomorrow. Will need interventional cardiology plan in place prior to surgical intervention.   -Dressing changed today. Wound care orders entered.   -Will continue to follow.         Thank you for your consult. I will follow-up with patient. Please contact us if you have any additional questions.    Salvatore LEOM PGY-1  Podiatric Medicine & Surgery  [facility]

## 2020-09-05 NOTE — PLAN OF CARE
Notified of concern that patient had been using illicit substance in room as he was having difficulty remaining awake while conversing with nursing. Due to this concern security was contacted to perform a room search. I went to speak to the patient who was originally amenable to having security look through the room however changed his mind when they arrived. Patient stated that he would be leaving AMA.    I discussed with Mr. Valenzuela the risk of leaving the hospital AMA due to the concern for osteomyelitis of his right great toe; I explained the high risk for death and limb loss. He endorsed understanding of these risks but stated that he would still be leaving AMA.    Raheem Naranjo IV, MD  [facility] Internal Medicine HO-II  4:38 PM 09/05/2020

## 2020-09-05 NOTE — SUBJECTIVE & OBJECTIVE
Scheduled Meds:   atorvastatin  40 mg Oral QHS    ceFEPime (MAXIPIME) IVPB  2 g Intravenous Q8H    cloNIDine  0.3 mg Oral TID    enoxaparin  40 mg Subcutaneous Q24H    famotidine  20 mg Oral BID    ferrous sulfate  325 mg Oral Daily    gabapentin  300 mg Oral BID    hydrALAZINE  100 mg Oral Q8H    insulin detemir U-100  10 Units Subcutaneous QHS    losartan  25 mg Oral Daily    metoprolol succinate  25 mg Oral Daily    metroNIDAZOLE  500 mg Oral Q8H    vancomycin (VANCOCIN) IVPB  1,750 mg Intravenous Q12H     Continuous Infusions:   sodium chloride 0.9% 100 mL/hr at 09/05/20 1301     PRN Meds:cyclobenzaprine, dextrose 50%, dextrose 50%, dicyclomine, glucagon (human recombinant), glucose, glucose, insulin aspart U-100, ondansetron, sodium chloride 0.9%, sodium chloride 0.9%, traZODone    Review of patient's allergies indicates:   Allergen Reactions    Lisinopril Hives     Angioedema         Past Medical History:   Diagnosis Date    Chronic diastolic heart failure     Diabetes mellitus     HLD (hyperlipidemia)     Hypertension      Past Surgical History:   Procedure Laterality Date    COLONOSCOPY Left 6/28/2016    Procedure: COLONOSCOPY;  Surgeon: ONEYDA Mckeon MD;  Location: [facility];  Service: Endoscopy;  Laterality: Left;  prostate exam         Family History     Problem Relation (Age of Onset)    Cancer Maternal Uncle    Diabetes Mother    Heart attack Maternal Grandfather    Hypertension Mother    Kidney disease Mother        Tobacco Use    Smoking status: Current Some Day Smoker     Packs/day: 0.50     Years: 33.00     Pack years: 16.50     Types: Cigarettes     Start date: 1987    Smokeless tobacco: Never Used    Tobacco comment:  Pt is enrolled in the [employer]. Ambulatory referral to Smoking Cessation program. Currently, chews Nicorette gum.   Substance and Sexual Activity    Alcohol use: No    Drug use: No    Sexual activity: Yes     Partners: Female     Review of Systems    Constitutional: Positive for chills. Negative for fever.   HENT: Negative for ear pain and sneezing.    Eyes: Negative for pain.   Respiratory: Negative for cough and shortness of breath.    Cardiovascular: Positive for leg swelling. Negative for chest pain.   Gastrointestinal: Negative for constipation, diarrhea, nausea and vomiting.   Genitourinary: Positive for frequency. Negative for flank pain.   Musculoskeletal: Positive for gait problem and joint swelling.   Skin: Positive for color change and wound.   Neurological: Positive for numbness. Negative for headaches.   Psychiatric/Behavioral: Negative for agitation and confusion.     Objective:     Vital Signs (Most Recent):  Temp: 97.3 °F (36.3 °C) (09/05/20 1316)  Pulse: 100 (09/05/20 1316)  Resp: (!) 22 (09/05/20 1316)  BP: (!) 196/99 (09/05/20 1316)  SpO2: (!) 92 % (09/05/20 1316) Vital Signs (24h Range):  Temp:  [97.3 °F (36.3 °C)-99.6 °F (37.6 °C)] 97.3 °F (36.3 °C)  Pulse:  [] 100  Resp:  [19-22] 22  SpO2:  [92 %-100 %] 92 %  BP: (156-196)/(80-99) 196/99     Weight: 108 kg (238 lb)  Body mass index is 30.56 kg/m².    Foot Exam    General  Orientation: alert and oriented to person, place, and time   Affect: appropriate       Right Foot/Ankle     Inspection and Palpation  Ecchymosis: none  Tenderness: none   Swelling: (1st digit)  Skin Exam: drainage, dry skin, skin changes, abnormal color, ulcer and erythema;     Neurovascular  Dorsalis pedis: absent  Posterior tibial: absent  Saphenous nerve sensation: diminished  Tibial nerve sensation: diminished  Superficial peroneal nerve sensation: diminished  Deep peroneal nerve sensation: diminished  Sural nerve sensation: diminished    Muscle Strength  Ankle dorsiflexion: 4  Ankle plantar flexion: 4      Left Foot/Ankle      Inspection and Palpation  Ecchymosis: none  Tenderness: none   Swelling: none   Skin Exam: dry skin;     Neurovascular  Dorsalis pedis: absent  Posterior tibial: absent  Saphenous nerve  sensation: diminished  Tibial nerve sensation: diminished  Superficial peroneal nerve sensation: diminished  Deep peroneal nerve sensation: diminished  Sural nerve sensation: diminished    Muscle Strength  Ankle dorsiflexion: 4  Ankle plantar flexion: 4            Laboratory:  Blood Cultures: No results for input(s): LABBLOO in the last 48 hours.  CBC:   Recent Labs   Lab 09/05/20  0741   WBC 9.48   RBC 2.96*   HGB 8.6*   HCT 25.8*      MCV 87   MCH 29.1   MCHC 33.3     CMP:   Recent Labs   Lab 09/05/20  0741   *   CALCIUM 8.2*   ALBUMIN 2.4*   PROT 7.3   *   K 4.1   CO2 23      BUN 20   CREATININE 1.1   ALKPHOS 112   ALT 18   AST 21   BILITOT 0.3     CRP: No results for input(s): CRP in the last 168 hours.  ESR: No results for input(s): SEDRATE in the last 168 hours.  Wound Cultures: No results for input(s): LABAERO in the last 4320 hours.  Microbiology Results (last 7 days)     Procedure Component Value Units Date/Time    Blood culture [[id number]] Collected: 09/02/20 2245    Order Status: Completed Specimen: Blood from Peripheral, Wrist, Left Updated: 09/05/20 1412     Blood Culture, Routine No Growth to date      No Growth to date      No Growth to date    Blood culture [[id number]] Collected: 09/02/20 2338    Order Status: Completed Specimen: Blood from Peripheral, Antecubital, Right Updated: 09/05/20 1412     Blood Culture, Routine No Growth to date      No Growth to date      No Growth to date        Specimen (12h ago, onward)    None          Diagnostic Results  CT Right Foot 9/3: Diffuse subcutaneous edema throughout the right foot suspicious for cellulitis.  No appreciable rim enhancing fluid collection to suggest abscess.  No soft tissue gas or foreign body.  Advanced atherosclerotic calcification.  Mild degenerative changes of the dorsal midfoot.  Joint spaces otherwise appear well maintained.  No evidence of osseous erosion.  If there is concern for osteomyelitis, consider  further evaluation with right foot MRI without and with IV contrast.   MRI Right Foot 9/5: Great toe inflammatory changes as above including osteomyelitis changes of the distal phalanx and distal aspect of the proximal phalanx.  Likely reactive edema changes with mid and proximal aspect of the 1st proximal phalanx although osteomyelitis not excluded either.   US Lower Extremities 9/5: No evidence of occlusion with generalized increased velocities throughout the lower extremities greater on the right.  Diffuse atherosclerotic plaquing, most focal at the proximal right SFA which demonstrates focal increase in velocity suggesting 50-75% stenosis. Bilateral subcutaneous edema present more present distally.     Clinical Findings  Tavera grade 3 wound at right distal 1st digit 1.5x0.7 cm at surface, probes to bone and probes up to 2.5 cm along plantar surface of bone. Periwound macerated. Yellow-red fluid present on dressings that were removed but no fluid could be expressed from wound on manual compression. Foul odor. Not tender. Entire digit swollen. Entire foot warm.

## 2020-09-05 NOTE — PLAN OF CARE
Pt left AMA       09/05/20 1713   Final Note   Assessment Type Final Discharge Note   Anticipated Discharge Disposition Left Against     Mary Carranza, RN Transitional Navigator  [phone]

## 2020-09-05 NOTE — PLAN OF CARE
Care plan explained & understood by pt. AAOx3 Meds given as per MAR. PCT reported that noticed pt was swallowing something & appears pt was hiding something while PCT was doing v/s. Reported this to OC & House Supervisor. Informed Security. Found 4 bottles of medications inside the room: Gabapentin, Omeprazole, Quietipine and Doxycycline and a box of Cigarette. Pt admitted that he took Quetipine. MD informed. VN Notified to update pt's home meds. Pt's /90- Md aware. Re-checked 176/82. Pt denies any pain. Not in distress. Safety maintained at all times. Call bell within reach. Bed on low position. Bed alarm on. Will continue to monitor.

## 2020-09-05 NOTE — PROGRESS NOTES
"[facility] Medicine Progress Note    Primary Team: [facility] Hospitalist Team A  Attending Physician: Vipin White MD  Resident: Rolando   Intern: Anayeli    Subjective:      Last night patient was found taking quetiapine 200 mg that was not on his med rec. Was somewhat drowsy when on-call physician came to speak with him. Otherwise, no complaints. Continues with R foot pain, worse with ambulation/weight-bearing. Denies subjective fever/chills, chest pain, SOB.      Objective:     Last 24 Hour Vital Signs:  BP  Min: 149/77  Max: 188/90  Temp  Av.7 °F (37.1 °C)  Min: 98 °F (36.7 °C)  Max: 99.6 °F (37.6 °C)  Pulse  Av.9  Min: 77  Max: 93  Resp  Av.2  Min: 16  Max: 20  SpO2  Av %  Min: 93 %  Max: 100 %  Height  Av' 2" (188 cm)  Min: 6' 2" (188 cm)  Max: 6' 2" (188 cm)  Weight  Av kg (238 lb)  Min: 108 kg (238 lb)  Max: 108 kg (238 lb)  I/O last 3 completed shifts:  In: 1040 [P.O.:1040]  Out: 1900 [Urine:1900]    Physical Examination:  Physical Exam  Constitutional:  Non-toxic appearance. He does not appear ill. No distress.   Pulmonary/Chest: Effort normal and breath sounds normal. He has no wheezes. He has no rhonchi. He has no rales.   Abdominal: Soft. Bowel sounds are normal. He exhibits no distension. There is no abdominal tenderness.   Cardiovascular: Normal rate, regular rhythm and normal heart sounds.   No murmur heard.  Dorsalis pedis pulses bilaterally unable to be detected 2/2 local tissue swelling  Bilateral inguinal lymphadenopathy, mobile, rubbery, 2-3 cm     Musculoskeletal:         General: Tenderness present. Swelling: bilateral swelling from feet to knees       Right lower leg: Edema present.      Left lower leg: Edema present.      Comments: Calor, dolor, and edema bilaterally from feet to knees   Lymphadenopathy:     He has no cervical adenopathy.   Neurological: He is alert.   Moving all extremities spontaneously    Skin: multiple well healed hypopigmented " scars    Laboratory:  Laboratory Data Reviewed: yes  Pertinent Findings:      Microbiology Data Reviewed: yes  Pertinent Findings:  9/2/2020 blood culture x2 no growth to date    Other Results:  EKG (my interpretation): none new    Radiology Data Reviewed: yes  Pertinent Findings:  MRI pending     Current Medications:     Infusions:   sodium chloride 0.9% 100 mL/hr at 09/04/20 1905        Scheduled:   atorvastatin  40 mg Oral QHS    ceFEPime (MAXIPIME) IVPB  2 g Intravenous Q8H    cloNIDine  0.3 mg Oral TID    enoxaparin  40 mg Subcutaneous Q24H    famotidine  20 mg Oral BID    ferrous sulfate  325 mg Oral Daily    gabapentin  300 mg Oral BID    hydrALAZINE  100 mg Oral BID    insulin detemir U-100  10 Units Subcutaneous QHS    metoprolol succinate  25 mg Oral Daily    metroNIDAZOLE  500 mg Oral Q8H    vancomycin (VANCOCIN) IVPB  1,750 mg Intravenous Q12H        PRN:  cyclobenzaprine, dextrose 50%, dextrose 50%, dicyclomine, glucagon (human recombinant), glucose, glucose, insulin aspart U-100, ondansetron, sodium chloride 0.9%, sodium chloride 0.9%, traZODone    Antibiotics and Day Number of Therapy:  Vanc IV 9/3-current  Cefepime IV 9/4-current  Flagyl IV 9/4-current  Zosyn IV  9/3-9/4    Lines and Day Number of Therapy:  PIV    Assessment:     Nicolas Rock is a 45 y.o.male with diastolic heart failure, DM1, HTN, HLD and chronic foot wound who presents today with bilateral leg cellulitis and right hallux wound, also concern for bacteremia in the setting of IVDU.        Plan:     Right Hallux Wound; s/p debridement with Podiatry 3 weeks ago, new purulent drainage. CT foot showed no bony erosion, but osteomyelitis could not be ruled out. No bone exposure seen on exam.   - wound care consulted  - ID consulted; recommended switching from IV Vanc/Zosyn to IV Vanc + cefepime + Flagyl   - MRI foot to evaluate for osteo is still pending  - if no osteo found, can consider discharge with po abx  - if  osteo is found, follow ID recs, but likely need biopsy or emperic treatment with vanc for 6 weeks  - LE distal pulses non-palpable; will attempt doppler today     Bilateral lower leg cellulitis; bilateral calor, dolor and swelling of lower legs of 1 week duration  - concern could have a component of CHF; TTE pending  - IV abx as above     Intravenous Drug Use; Extensive history of IV drug use, last use 2 days ago.  -Blood cultures NGTD, HIV negative, HepC pending, TTE pending  -high risk of withdrawal, PRN trazodone 100mg, cyclobenzaprine 10mg, dicyclomine 20mg     Diastolic Heart Failure; last ECHO was 2016 showing diastolic dysfunction with preserved ejection fraction.   - will repeat ECHO on this admission to ensure maximal medical management  - currently on hydralazine, labetalol and clonidine; will switch from labetalol 300 mg BID to Toprol-XL 25 mg QD  - pt with hx of orofacial swelling with ACE-I; will consider trialing ARB while in hospital    Diabetes Mellitus, Type 1 with neuropathy   - cannot confirm type 1 vs type 2, but pt reports being diagnosed at age 10 and is insulin dependent   - pt reports sliding scale regular insulin with meals and 10 units of detemir nightly  -  HbgA1c is 7.0 at this admission   - will continue SSI and nightly basal 10 units   - will continue home gabapentin 300 BID     HTN  - BP on admission was 193/96  - home meds include hydralazine 100 BID, labetalol 300 BID, clonidine 0.3 TID  -  Will d/c labetalol 300 mg BID and start Toprol-XL 25 mg QD  - increase home hydralazine 100 BID to TID     Chronic Intermediate Inguinal Lymphadenopathy; suspect hidradenitis suppurativa.   -Recurrent cycles of draining bilateral inguinal lymphadenopathy since 25 y.o.  -Inguinal lymph nodes currently tender and swollen  -monitor closely will consider ultrasound     Normocytic Anemia  -Admission H/H 9/27, 3 years ago 14.7/42.2  -Likely anemia of chronic disease due to elevated ferritin of 319,  normocytic RBC's and pt denies hematochezia/melena  -Iron studies show low iron and TIBC, folate, and Vit B12 wnl  - started ferrous sulfate 325 mg every other day       Proteinuria  -9/2 UA 3+ protein, likely 2/2 to longstanding DM.  -Renal function WNL, Cr .84, GFR >60  -microablumin/creatine ratio is elevated   -repeat UA yesterday with 1+ protein    Health Maintenance  - Lipid panel (9/4/20): , LDL 59.8, HDL 42, TG 56  - ASCVD 10-year risk 29.2%  - pt started on atorvastatin 40 mg QD      Code Status:      Full code  Ppx: lovenox  Diet: diabetic      Dispo: evaluate for osteomyelitis, medically maximize heart failure      Yoli De Luna MD  [facility] Internal Medicine -III    [facility] Medicine Hospitalist Pager numbers:   [facility] Hospitalist Medicine Team A (Cindy/Chhaya): [phone]  [facility] Hospitalist Medicine Team B (Kentrell/Nehemias):  [phone]

## 2020-09-05 NOTE — ASSESSMENT & PLAN NOTE
A: Osteomyelitis of 1st distal and proximal phalanges of the right foot in diabetic patient based on probe to bone test and MRI. WBCs WNL, procalcitonin WNL. Patient tachycardic, tachypneic, with low oxygen saturation today though patient was comfortable and not distressed in room, moving sensors on body during visit which may account for some abnormal readings. SFA stenosis on vascular study. Infectious disease and interventional cardiology on board. Of note, patient was seen to fall asleep mid-conversation multiple times and fell asleep while standing urinating per nursing.     P:   -Antibiotics per ID.  -Discussed osteomyelitis treatment options to include 1st digit amputation vs 6 weeks IV antibiotics. Patient not amenable to amputation at this time, he is willing to discuss this again tomorrow. Will need interventional cardiology plan in place prior to surgical intervention.   -Dressing changed today. Wound care orders entered.   -Will continue to follow.

## 2020-09-05 NOTE — PLAN OF CARE
RN called about patient taking home medication. Patient found with 4 bottles of medications in room: Gabapentin, Omeprazole, Doxycycline, Seroquel. Patient states he took Seroquel tonight. Patient somewhat lethargic from Seroquel. BP high but has been in this range during admission. No other issues right now. Notified RN to call back if any other concerns.    Diana Cartagena MD  [facility] IM PGY-1  9/5/20 2:13 AM

## 2020-09-08 LAB
BACTERIA BLD CULT: NORMAL
BACTERIA BLD CULT: NORMAL
HCV RNA SERPL NAA+PROBE-LOG IU: 5.57 LOG (10) IU/ML
HCV RNA SERPL QL NAA+PROBE: DETECTED IU/ML
HCV RNA SPEC NAA+PROBE-ACNC: ABNORMAL IU/ML

## 2020-09-09 NOTE — PLAN OF CARE
Results of HCV viral load came back with detectable load.     I attempted to call pt on two separate occasions and the number in file when straight to voicemail. Unable to verify if this correct number, therefore did not leave a message.     Pt left AMA from [facility] on 9/4/2020 from a hospitalization for osteomyelitis.     Tino Marshall MD PGY-III  [facility]

## 2020-09-18 NOTE — PHYSICIAN QUERY
PT Name: Nicolas Rock  MR #: [id number]     ACUITY OF CONDITION CLARIFICATION      CDS/: Ruth Goldman RN            Contact information: [email or web address]  This form is a permanent document in the medical record.     Query Date: September 18, 2020    By submitting this query, we are merely seeking further clarification of documentation to reflect the severity of illness of your patient. Please utilize your independent clinical judgment when addressing the question(s) below.    The Medical record reflects the following:     Indicators   Supporting Clinical Findings Location in Medical Record   x Documentation of condition Osteomyelitis of 1st distal and proximal phalanges of the right foot in diabetic patient based on probe to bone test and MRI    Chronic ulcer of great toe of right foot with fat layer exposed      POD consult of 9/5      Discharge Summary 9/4   x Lab Value(s)    9/2/2020 22:52 9/3/2020 13:25 9/4/2020 06:13 9/5/2020 07:41   WBC 12.43 10.27 11.09 9.48            Labs 9/2 to 9/5   x Radiology Findings  MRI Great toe inflammatory changes as above including osteomyelitis changes of the distal phalanx and distal aspect of the proximal phalanx.  Likely reactive edema changes with mid and proximal aspect of the 1st proximal phalanx although osteomyelitis not excluded either   MRI  9/5   x Treatment/Medication Cefepine IV   Metrondazole PO  Piperacillin IV   Vancomycin IV   MAR 9/4 to 9/6  MAR 9/4 to 9/6  MAR 9/2 to 9/4  MAR 9/2 tp 9/6   x Other Discussed osteomyelitis treatment options to include 1st digit amputation vs 6 weeks IV antibiotics   POD consult of 9/5      Provider, please specify the acuity/chronicity of Osteomyelitis:    [   ] Acute   [   ]  Subacute   [   ] Chronic   [  x ] Acute on chronic   [   ]  Clinically Undetermined             Please document in your progress notes daily for the duration of treatment until resolved, and include in your discharge summary.

## 2021-01-01 ENCOUNTER — PATIENT OUTREACH (OUTPATIENT)
Dept: ADMINISTRATIVE | Facility: CLINIC | Age: 46
End: 2021-01-01
Payer: MEDICAID

## 2021-01-01 ENCOUNTER — TELEPHONE (OUTPATIENT)
Dept: PODIATRY | Facility: CLINIC | Age: 46
End: 2021-01-01
Payer: MEDICAID

## 2021-01-01 ENCOUNTER — HOSPITAL ENCOUNTER (EMERGENCY)
Facility: HOSPITAL | Age: 46
Discharge: HOME | End: 2021-09-10
Attending: EMERGENCY MEDICINE
Payer: MEDICAID

## 2021-01-01 ENCOUNTER — HOSPITAL ENCOUNTER (EMERGENCY)
Facility: HOSPITAL | Age: 46
Discharge: HOME | End: 2021-12-20
Attending: EMERGENCY MEDICINE
Payer: MEDICAID

## 2021-01-01 ENCOUNTER — TELEPHONE (OUTPATIENT)
Dept: EMERGENCY MEDICINE | Facility: HOSPITAL | Age: 46
End: 2021-01-01
Payer: MEDICAID

## 2021-01-01 ENCOUNTER — HOSPITAL ENCOUNTER (EMERGENCY)
Facility: HOSPITAL | Age: 46
Discharge: LEFT BEFORE DISCHARGE | End: 2021-11-04
Attending: EMERGENCY MEDICINE
Payer: MEDICAID

## 2021-01-01 ENCOUNTER — ANESTHESIA EVENT (OUTPATIENT)
Dept: SURGERY | Facility: HOSPITAL | Age: 46
DRG: 616 | End: 2021-01-01
Payer: MEDICAID

## 2021-01-01 ENCOUNTER — HOSPITAL ENCOUNTER (EMERGENCY)
Facility: HOSPITAL | Age: 46
Discharge: HOME | End: 2021-10-17
Attending: EMERGENCY MEDICINE
Payer: MEDICAID

## 2021-01-01 ENCOUNTER — OFFICE VISIT (OUTPATIENT)
Dept: VASCULAR SURGERY | Facility: CLINIC | Age: 46
End: 2021-01-01
Payer: MEDICAID

## 2021-01-01 ENCOUNTER — ANESTHESIA (OUTPATIENT)
Dept: SURGERY | Facility: HOSPITAL | Age: 46
DRG: 616 | End: 2021-01-01
Payer: MEDICAID

## 2021-01-01 ENCOUNTER — TELEPHONE (OUTPATIENT)
Dept: VASCULAR SURGERY | Facility: CLINIC | Age: 46
End: 2021-01-01
Payer: MEDICAID

## 2021-01-01 ENCOUNTER — HOSPITAL ENCOUNTER (INPATIENT)
Facility: HOSPITAL | Age: 46
LOS: 8 days | Discharge: HOME | DRG: 616 | End: 2021-12-05
Attending: INTERNAL MEDICINE | Admitting: INTERNAL MEDICINE
Payer: MEDICAID

## 2021-01-01 ENCOUNTER — HOSPITAL ENCOUNTER (EMERGENCY)
Facility: HOSPITAL | Age: 46
Discharge: HOME | End: 2021-10-08
Attending: EMERGENCY MEDICINE
Payer: MEDICAID

## 2021-01-01 ENCOUNTER — HOSPITAL ENCOUNTER (EMERGENCY)
Facility: HOSPITAL | Age: 46
Discharge: HOME | End: 2021-11-18
Attending: EMERGENCY MEDICINE
Payer: MEDICAID

## 2021-01-01 ENCOUNTER — HOSPITAL ENCOUNTER (EMERGENCY)
Facility: HOSPITAL | Age: 46
Discharge: LEFT BEFORE DISCHARGE | End: 2021-09-27
Attending: EMERGENCY MEDICINE
Payer: MEDICAID

## 2021-01-01 ENCOUNTER — HOSPITAL ENCOUNTER (EMERGENCY)
Facility: HOSPITAL | Age: 46
Discharge: HOME | End: 2021-08-15
Attending: EMERGENCY MEDICINE
Payer: MEDICAID

## 2021-01-01 VITALS
TEMPERATURE: 98 F | OXYGEN SATURATION: 100 % | SYSTOLIC BLOOD PRESSURE: 195 MMHG | WEIGHT: 256.81 LBS | DIASTOLIC BLOOD PRESSURE: 93 MMHG | BODY MASS INDEX: 32.96 KG/M2 | HEIGHT: 74 IN | HEART RATE: 76 BPM | RESPIRATION RATE: 18 BRPM

## 2021-01-01 VITALS — TEMPERATURE: 99 F | SYSTOLIC BLOOD PRESSURE: 192 MMHG | HEART RATE: 90 BPM | DIASTOLIC BLOOD PRESSURE: 93 MMHG

## 2021-01-01 VITALS
BODY MASS INDEX: 32.08 KG/M2 | DIASTOLIC BLOOD PRESSURE: 84 MMHG | OXYGEN SATURATION: 97 % | HEART RATE: 88 BPM | WEIGHT: 250 LBS | TEMPERATURE: 99 F | RESPIRATION RATE: 18 BRPM | HEIGHT: 74 IN | SYSTOLIC BLOOD PRESSURE: 177 MMHG

## 2021-01-01 VITALS
HEART RATE: 79 BPM | BODY MASS INDEX: 32.1 KG/M2 | RESPIRATION RATE: 19 BRPM | DIASTOLIC BLOOD PRESSURE: 87 MMHG | WEIGHT: 250 LBS | OXYGEN SATURATION: 98 % | TEMPERATURE: 99 F | SYSTOLIC BLOOD PRESSURE: 175 MMHG

## 2021-01-01 VITALS
OXYGEN SATURATION: 100 % | HEIGHT: 73 IN | TEMPERATURE: 98 F | RESPIRATION RATE: 16 BRPM | BODY MASS INDEX: 31.81 KG/M2 | WEIGHT: 240 LBS | SYSTOLIC BLOOD PRESSURE: 180 MMHG | DIASTOLIC BLOOD PRESSURE: 81 MMHG | HEART RATE: 107 BPM

## 2021-01-01 VITALS
DIASTOLIC BLOOD PRESSURE: 104 MMHG | WEIGHT: 240 LBS | OXYGEN SATURATION: 98 % | SYSTOLIC BLOOD PRESSURE: 215 MMHG | BODY MASS INDEX: 30.8 KG/M2 | TEMPERATURE: 99 F | HEIGHT: 74 IN | HEART RATE: 87 BPM | RESPIRATION RATE: 17 BRPM

## 2021-01-01 VITALS
OXYGEN SATURATION: 95 % | HEART RATE: 80 BPM | SYSTOLIC BLOOD PRESSURE: 230 MMHG | BODY MASS INDEX: 33.38 KG/M2 | WEIGHT: 260 LBS | RESPIRATION RATE: 30 BRPM | TEMPERATURE: 99 F | DIASTOLIC BLOOD PRESSURE: 113 MMHG

## 2021-01-01 VITALS
TEMPERATURE: 99 F | DIASTOLIC BLOOD PRESSURE: 78 MMHG | OXYGEN SATURATION: 100 % | RESPIRATION RATE: 20 BRPM | BODY MASS INDEX: 32.02 KG/M2 | SYSTOLIC BLOOD PRESSURE: 176 MMHG | HEIGHT: 73 IN | HEART RATE: 87 BPM | WEIGHT: 241.63 LBS

## 2021-01-01 VITALS
HEART RATE: 98 BPM | RESPIRATION RATE: 18 BRPM | OXYGEN SATURATION: 93 % | SYSTOLIC BLOOD PRESSURE: 159 MMHG | TEMPERATURE: 97 F | WEIGHT: 240.06 LBS | DIASTOLIC BLOOD PRESSURE: 79 MMHG | BODY MASS INDEX: 30.82 KG/M2

## 2021-01-01 VITALS
SYSTOLIC BLOOD PRESSURE: 180 MMHG | TEMPERATURE: 98 F | HEART RATE: 77 BPM | OXYGEN SATURATION: 97 % | DIASTOLIC BLOOD PRESSURE: 90 MMHG | RESPIRATION RATE: 19 BRPM | HEIGHT: 74 IN | WEIGHT: 280 LBS | BODY MASS INDEX: 35.94 KG/M2

## 2021-01-01 DIAGNOSIS — F14.10 COCAINE ABUSE: ICD-10-CM

## 2021-01-01 DIAGNOSIS — K59.00 CONSTIPATION, UNSPECIFIED CONSTIPATION TYPE: ICD-10-CM

## 2021-01-01 DIAGNOSIS — F14.10 COCAINE ABUSE: Primary | ICD-10-CM

## 2021-01-01 DIAGNOSIS — L97.513 DIABETIC ULCER OF TOE OF RIGHT FOOT ASSOCIATED WITH TYPE 2 DIABETES MELLITUS, WITH NECROSIS OF MUSCLE: ICD-10-CM

## 2021-01-01 DIAGNOSIS — I10 POORLY-CONTROLLED HYPERTENSION: ICD-10-CM

## 2021-01-01 DIAGNOSIS — F11.90 OPIOID USE DISORDER: ICD-10-CM

## 2021-01-01 DIAGNOSIS — Z79.4 TYPE 2 DIABETES MELLITUS WITH HYPERGLYCEMIA, WITH LONG-TERM CURRENT USE OF INSULIN: ICD-10-CM

## 2021-01-01 DIAGNOSIS — L02.611 FOOT ABSCESS, RIGHT: Primary | ICD-10-CM

## 2021-01-01 DIAGNOSIS — L97.512 CHRONIC ULCER OF GREAT TOE OF RIGHT FOOT WITH FAT LAYER EXPOSED: ICD-10-CM

## 2021-01-01 DIAGNOSIS — K59.00 CONSTIPATION: ICD-10-CM

## 2021-01-01 DIAGNOSIS — M86.371 CHRONIC MULTIFOCAL OSTEOMYELITIS OF RIGHT FOOT: ICD-10-CM

## 2021-01-01 DIAGNOSIS — F11.10 HEROIN ABUSE: ICD-10-CM

## 2021-01-01 DIAGNOSIS — Z89.611 S/P ABOVE KNEE AMPUTATION, RIGHT: ICD-10-CM

## 2021-01-01 DIAGNOSIS — K59.03 DRUG-INDUCED CONSTIPATION: Primary | ICD-10-CM

## 2021-01-01 DIAGNOSIS — M86.171 OSTEOMYELITIS OF FOOT, RIGHT, ACUTE: ICD-10-CM

## 2021-01-01 DIAGNOSIS — F43.22 ADJUSTMENT DISORDER WITH ANXIETY: ICD-10-CM

## 2021-01-01 DIAGNOSIS — Z91.148 NONCOMPLIANCE WITH MEDICATION REGIMEN: ICD-10-CM

## 2021-01-01 DIAGNOSIS — D72.829 LEUKOCYTOSIS, UNSPECIFIED TYPE: ICD-10-CM

## 2021-01-01 DIAGNOSIS — Z79.4 TYPE 2 DIABETES MELLITUS WITH DIABETIC PERIPHERAL ANGIOPATHY AND GANGRENE, WITH LONG-TERM CURRENT USE OF INSULIN: ICD-10-CM

## 2021-01-01 DIAGNOSIS — M86.9 OSTEOMYELITIS OF RIGHT FOOT, UNSPECIFIED TYPE: ICD-10-CM

## 2021-01-01 DIAGNOSIS — K56.41 FECAL IMPACTION IN RECTUM: Primary | ICD-10-CM

## 2021-01-01 DIAGNOSIS — M86.9: ICD-10-CM

## 2021-01-01 DIAGNOSIS — E11.52 TYPE 2 DIABETES MELLITUS WITH DIABETIC PERIPHERAL ANGIOPATHY AND GANGRENE, WITH LONG-TERM CURRENT USE OF INSULIN: ICD-10-CM

## 2021-01-01 DIAGNOSIS — R06.02 SOB (SHORTNESS OF BREATH): ICD-10-CM

## 2021-01-01 DIAGNOSIS — F11.93 METHADONE WITHDRAWAL: Primary | ICD-10-CM

## 2021-01-01 DIAGNOSIS — I10 HYPERTENSION, UNSPECIFIED TYPE: Primary | ICD-10-CM

## 2021-01-01 DIAGNOSIS — E11.65 TYPE 2 DIABETES MELLITUS WITH HYPERGLYCEMIA, WITH LONG-TERM CURRENT USE OF INSULIN: ICD-10-CM

## 2021-01-01 DIAGNOSIS — D64.9 ANEMIA, UNSPECIFIED TYPE: ICD-10-CM

## 2021-01-01 DIAGNOSIS — R52 PAIN: ICD-10-CM

## 2021-01-01 DIAGNOSIS — I73.9 PVD (PERIPHERAL VASCULAR DISEASE): ICD-10-CM

## 2021-01-01 DIAGNOSIS — L97.921: ICD-10-CM

## 2021-01-01 DIAGNOSIS — M86.9 OSTEOMYELITIS OF RIGHT FOOT: ICD-10-CM

## 2021-01-01 DIAGNOSIS — E11.621 DIABETIC ULCER OF TOE OF RIGHT FOOT ASSOCIATED WITH TYPE 2 DIABETES MELLITUS, WITH NECROSIS OF MUSCLE: ICD-10-CM

## 2021-01-01 DIAGNOSIS — F19.90 IVDU (INTRAVENOUS DRUG USER): Chronic | ICD-10-CM

## 2021-01-01 DIAGNOSIS — R10.30 LOWER ABDOMINAL PAIN: ICD-10-CM

## 2021-01-01 DIAGNOSIS — Z89.611 S/P ABOVE KNEE AMPUTATION, RIGHT: Primary | ICD-10-CM

## 2021-01-01 DIAGNOSIS — I50.9 CONGESTIVE HEART FAILURE, UNSPECIFIED HF CHRONICITY, UNSPECIFIED HEART FAILURE TYPE: ICD-10-CM

## 2021-01-01 DIAGNOSIS — R10.9 ABDOMINAL PAIN: ICD-10-CM

## 2021-01-01 DIAGNOSIS — M86.571 OTHER CHRONIC HEMATOGENOUS OSTEOMYELITIS OF RIGHT FOOT: Primary | ICD-10-CM

## 2021-01-01 DIAGNOSIS — F11.93 OPIOID WITHDRAWAL: ICD-10-CM

## 2021-01-01 DIAGNOSIS — K59.00 CONSTIPATION, UNSPECIFIED CONSTIPATION TYPE: Primary | ICD-10-CM

## 2021-01-01 DIAGNOSIS — R50.9 FEVER, UNSPECIFIED FEVER CAUSE: ICD-10-CM

## 2021-01-01 LAB
ABO + RH BLD: NORMAL
ABO AND RH: NORMAL
ALBUMIN SERPL BCP-MCNC: 1.3 G/DL (ref 3.5–5.2)
ALBUMIN SERPL BCP-MCNC: 1.4 G/DL (ref 3.5–5.2)
ALBUMIN SERPL BCP-MCNC: 1.4 G/DL (ref 3.5–5.2)
ALBUMIN SERPL BCP-MCNC: 1.5 G/DL (ref 3.5–5.2)
ALBUMIN SERPL BCP-MCNC: 1.5 G/DL (ref 3.5–5.2)
ALBUMIN SERPL BCP-MCNC: 1.6 G/DL (ref 3.5–5.2)
ALBUMIN SERPL BCP-MCNC: 2.7 G/DL (ref 3.5–5.2)
ALBUMIN SERPL BCP-MCNC: 3.3 G/DL (ref 3.5–5.2)
ALP SERPL-CCNC: 105 U/L (ref 55–135)
ALP SERPL-CCNC: 116 U/L (ref 38–126)
ALP SERPL-CCNC: 127 U/L (ref 55–135)
ALP SERPL-CCNC: 132 U/L (ref 55–135)
ALP SERPL-CCNC: 136 U/L (ref 55–135)
ALP SERPL-CCNC: 139 U/L (ref 55–135)
ALP SERPL-CCNC: 87 U/L (ref 55–135)
ALP SERPL-CCNC: 94 U/L (ref 55–135)
ALP SERPL-CCNC: 97 U/L (ref 38–126)
ALP SERPL-CCNC: 99 U/L (ref 55–135)
ALT SERPL W/O P-5'-P-CCNC: 11 U/L (ref 10–44)
ALT SERPL W/O P-5'-P-CCNC: 12 U/L (ref 10–44)
ALT SERPL W/O P-5'-P-CCNC: 13 U/L (ref 10–44)
ALT SERPL W/O P-5'-P-CCNC: 14 U/L (ref 10–44)
ALT SERPL W/O P-5'-P-CCNC: 15 U/L (ref 10–44)
ALT SERPL W/O P-5'-P-CCNC: 17 U/L (ref 10–44)
AMPHET+METHAMPHET UR QL: NEGATIVE
ANION GAP SERPL CALC-SCNC: 10 MMOL/L (ref 8–16)
ANION GAP SERPL CALC-SCNC: 5 MMOL/L (ref 8–16)
ANION GAP SERPL CALC-SCNC: 8 MMOL/L (ref 8–16)
ANION GAP SERPL CALC-SCNC: 8 MMOL/L (ref 8–16)
ANION GAP SERPL CALC-SCNC: 9 MMOL/L (ref 8–16)
ANISOCYTOSIS BLD QL SMEAR: SLIGHT
AST SERPL-CCNC: 15 U/L (ref 10–40)
AST SERPL-CCNC: 16 U/L (ref 10–40)
AST SERPL-CCNC: 18 U/L (ref 10–40)
AST SERPL-CCNC: 19 U/L (ref 10–40)
AST SERPL-CCNC: 19 U/L (ref 15–46)
AST SERPL-CCNC: 22 U/L (ref 10–40)
AST SERPL-CCNC: 24 U/L (ref 10–40)
AST SERPL-CCNC: 25 U/L (ref 10–40)
AST SERPL-CCNC: 25 U/L (ref 15–46)
AST SERPL-CCNC: 43 U/L (ref 10–40)
BACTERIA #/AREA URNS AUTO: ABNORMAL /HPF
BACTERIA #/AREA URNS AUTO: ABNORMAL /HPF
BACTERIA #/AREA URNS AUTO: NORMAL /HPF
BACTERIA BLD CULT: NORMAL
BACTERIA SPEC AEROBE CULT: ABNORMAL
BACTERIA SPEC ANAEROBE CULT: ABNORMAL
BACTERIA UR CULT: ABNORMAL
BARBITURATES UR QL SCN>200 NG/ML: NEGATIVE
BASOPHILS # BLD AUTO: 0.04 K/UL (ref 0–0.2)
BASOPHILS # BLD AUTO: 0.04 K/UL (ref 0–0.2)
BASOPHILS # BLD AUTO: 0.06 K/UL (ref 0–0.2)
BASOPHILS # BLD AUTO: 0.07 K/UL (ref 0–0.2)
BASOPHILS # BLD AUTO: 0.07 K/UL (ref 0–0.2)
BASOPHILS # BLD AUTO: 0.1 K/UL (ref 0–0.2)
BASOPHILS # BLD AUTO: 0.12 K/UL (ref 0–0.2)
BASOPHILS # BLD AUTO: 0.13 K/UL (ref 0–0.2)
BASOPHILS NFR BLD: 0.3 % (ref 0–1.9)
BASOPHILS NFR BLD: 0.4 % (ref 0–1.9)
BASOPHILS NFR BLD: 0.4 % (ref 0–1.9)
BASOPHILS NFR BLD: 0.5 % (ref 0–1.9)
BASOPHILS NFR BLD: 0.5 % (ref 0–1.9)
BASOPHILS NFR BLD: 0.6 % (ref 0–1.9)
BASOPHILS NFR BLD: 0.6 % (ref 0–1.9)
BASOPHILS NFR BLD: 0.7 % (ref 0–1.9)
BASOPHILS NFR BLD: 0.8 % (ref 0–1.9)
BENZODIAZ UR QL SCN>200 NG/ML: NEGATIVE
BILIRUB SERPL-MCNC: 0.6 MG/DL (ref 0.1–1)
BILIRUB SERPL-MCNC: 0.7 MG/DL (ref 0.1–1)
BILIRUB SERPL-MCNC: 0.8 MG/DL (ref 0.1–1)
BILIRUB SERPL-MCNC: 1.2 MG/DL (ref 0.1–1)
BILIRUB SERPL-MCNC: 1.3 MG/DL (ref 0.1–1)
BILIRUB SERPL-MCNC: 1.4 MG/DL (ref 0.1–1)
BILIRUB SERPL-MCNC: 1.4 MG/DL (ref 0.1–1)
BILIRUB SERPL-MCNC: 1.5 MG/DL (ref 0.1–1)
BILIRUB UR QL STRIP: NEGATIVE
BLD GP AB SCN CELLS X3 SERPL QL: NORMAL
BLD GP AB SCN CELLS X3 SERPL QL: NORMAL
BLD PROD TYP BPU: NORMAL
BLOOD UNIT EXPIRATION DATE: NORMAL
BLOOD UNIT TYPE CODE: 5100
BLOOD UNIT TYPE: NORMAL
BUN SERPL-MCNC: 13 MG/DL (ref 6–20)
BUN SERPL-MCNC: 15 MG/DL (ref 6–20)
BUN SERPL-MCNC: 18 MG/DL (ref 6–20)
BUN SERPL-MCNC: 21 MG/DL (ref 6–20)
BUN SERPL-MCNC: 21 MG/DL (ref 6–20)
BUN SERPL-MCNC: 23 MG/DL (ref 6–20)
BZE UR QL SCN: ABNORMAL
BZE UR QL SCN: NEGATIVE
BZE UR QL SCN: NEGATIVE
CALCIUM SERPL-MCNC: 7.4 MG/DL (ref 8.7–10.5)
CALCIUM SERPL-MCNC: 7.6 MG/DL (ref 8.7–10.5)
CALCIUM SERPL-MCNC: 7.9 MG/DL (ref 8.7–10.5)
CALCIUM SERPL-MCNC: 8 MG/DL (ref 8.7–10.5)
CALCIUM SERPL-MCNC: 8.1 MG/DL (ref 8.7–10.5)
CALCIUM SERPL-MCNC: 8.4 MG/DL (ref 8.7–10.5)
CANNABINOIDS UR QL SCN: NEGATIVE
CHLORIDE SERPL-SCNC: 101 MMOL/L (ref 95–110)
CHLORIDE SERPL-SCNC: 102 MMOL/L (ref 95–110)
CHLORIDE SERPL-SCNC: 102 MMOL/L (ref 95–110)
CHLORIDE SERPL-SCNC: 103 MMOL/L (ref 95–110)
CHLORIDE SERPL-SCNC: 105 MMOL/L (ref 95–110)
CHLORIDE SERPL-SCNC: 97 MMOL/L (ref 95–110)
CHLORIDE SERPL-SCNC: 99 MMOL/L (ref 95–110)
CLARITY UR REFRACT.AUTO: CLEAR
CO2 SERPL-SCNC: 20 MMOL/L (ref 23–29)
CO2 SERPL-SCNC: 20 MMOL/L (ref 23–29)
CO2 SERPL-SCNC: 21 MMOL/L (ref 23–29)
CO2 SERPL-SCNC: 22 MMOL/L (ref 23–29)
CO2 SERPL-SCNC: 22 MMOL/L (ref 23–29)
CO2 SERPL-SCNC: 23 MMOL/L (ref 23–29)
CO2 SERPL-SCNC: 23 MMOL/L (ref 23–29)
CO2 SERPL-SCNC: 24 MMOL/L (ref 23–29)
CO2 SERPL-SCNC: 25 MMOL/L (ref 23–29)
CO2 SERPL-SCNC: 28 MMOL/L (ref 23–29)
CODING SYSTEM: NORMAL
COLOR UR AUTO: ABNORMAL
CREAT SERPL-MCNC: 0.8 MG/DL (ref 0.5–1.4)
CREAT SERPL-MCNC: 0.9 MG/DL (ref 0.5–1.4)
CREAT SERPL-MCNC: 1 MG/DL (ref 0.5–1.4)
CREAT SERPL-MCNC: 1 MG/DL (ref 0.5–1.4)
CREAT SERPL-MCNC: 1.04 MG/DL (ref 0.5–1.4)
CREAT SERPL-MCNC: 1.04 MG/DL (ref 0.5–1.4)
CREAT SERPL-MCNC: 1.1 MG/DL (ref 0.5–1.4)
CREAT UR-MCNC: 138.8 MG/DL (ref 23–375)
CREAT UR-MCNC: 147.1 MG/DL (ref 23–375)
CREAT UR-MCNC: 20 MG/DL (ref 23–375)
CRP SERPL-MCNC: 6.13 MG/DL (ref 0–1)
DIFFERENTIAL METHOD: ABNORMAL
DISPENSE STATUS: NORMAL
EOSINOPHIL # BLD AUTO: 0.1 K/UL (ref 0–0.5)
EOSINOPHIL # BLD AUTO: 0.2 K/UL (ref 0–0.5)
EOSINOPHIL # BLD AUTO: 0.3 K/UL (ref 0–0.5)
EOSINOPHIL # BLD AUTO: 0.3 K/UL (ref 0–0.5)
EOSINOPHIL # BLD AUTO: 0.4 K/UL (ref 0–0.5)
EOSINOPHIL # BLD AUTO: 0.4 K/UL (ref 0–0.5)
EOSINOPHIL # BLD AUTO: 0.6 K/UL (ref 0–0.5)
EOSINOPHIL # BLD AUTO: 0.6 K/UL (ref 0–0.5)
EOSINOPHIL NFR BLD: 0.6 % (ref 0–8)
EOSINOPHIL NFR BLD: 0.6 % (ref 0–8)
EOSINOPHIL NFR BLD: 0.8 % (ref 0–8)
EOSINOPHIL NFR BLD: 0.9 % (ref 0–8)
EOSINOPHIL NFR BLD: 0.9 % (ref 0–8)
EOSINOPHIL NFR BLD: 1.2 % (ref 0–8)
EOSINOPHIL NFR BLD: 1.4 % (ref 0–8)
EOSINOPHIL NFR BLD: 1.4 % (ref 0–8)
EOSINOPHIL NFR BLD: 1.7 % (ref 0–8)
EOSINOPHIL NFR BLD: 2.4 % (ref 0–8)
EOSINOPHIL NFR BLD: 3.2 % (ref 0–8)
EOSINOPHIL NFR BLD: 3.9 % (ref 0–8)
ERYTHROCYTE [DISTWIDTH] IN BLOOD BY AUTOMATED COUNT: 13.5 % (ref 11.5–14.5)
ERYTHROCYTE [DISTWIDTH] IN BLOOD BY AUTOMATED COUNT: 13.9 % (ref 11.5–14.5)
ERYTHROCYTE [DISTWIDTH] IN BLOOD BY AUTOMATED COUNT: 14.8 % (ref 11.5–14.5)
ERYTHROCYTE [DISTWIDTH] IN BLOOD BY AUTOMATED COUNT: 15.2 % (ref 11.5–14.5)
ERYTHROCYTE [DISTWIDTH] IN BLOOD BY AUTOMATED COUNT: 15.5 % (ref 11.5–14.5)
ERYTHROCYTE [DISTWIDTH] IN BLOOD BY AUTOMATED COUNT: 15.7 % (ref 11.5–14.5)
ERYTHROCYTE [DISTWIDTH] IN BLOOD BY AUTOMATED COUNT: 16.3 % (ref 11.5–14.5)
ERYTHROCYTE [DISTWIDTH] IN BLOOD BY AUTOMATED COUNT: 16.6 % (ref 11.5–14.5)
ERYTHROCYTE [DISTWIDTH] IN BLOOD BY AUTOMATED COUNT: 16.6 % (ref 11.5–14.5)
ERYTHROCYTE [DISTWIDTH] IN BLOOD BY AUTOMATED COUNT: 16.7 % (ref 11.5–14.5)
EST. GFR  (AFRICAN AMERICAN): >60 ML/MIN/1.73 M^2
EST. GFR  (NON AFRICAN AMERICAN): >60 ML/MIN/1.73 M^2
ESTIMATED AVG GLUCOSE: 111 MG/DL (ref 68–131)
ETHANOL UR-MCNC: <10 MG/DL
FINAL PATHOLOGIC DIAGNOSIS: NORMAL
FINAL PATHOLOGIC DIAGNOSIS: NORMAL
FUNGUS SPEC CULT: NORMAL
FUNGUS SPEC CULT: NORMAL
GLUCOSE SERPL-MCNC: 118 MG/DL (ref 70–110)
GLUCOSE SERPL-MCNC: 127 MG/DL (ref 70–110)
GLUCOSE SERPL-MCNC: 169 MG/DL (ref 70–110)
GLUCOSE SERPL-MCNC: 175 MG/DL (ref 70–110)
GLUCOSE SERPL-MCNC: 207 MG/DL (ref 70–110)
GLUCOSE SERPL-MCNC: 212 MG/DL (ref 70–110)
GLUCOSE SERPL-MCNC: 54 MG/DL (ref 70–110)
GLUCOSE SERPL-MCNC: 71 MG/DL (ref 70–110)
GLUCOSE SERPL-MCNC: 75 MG/DL (ref 70–110)
GLUCOSE SERPL-MCNC: 77 MG/DL (ref 70–110)
GLUCOSE UR QL STRIP: NEGATIVE
GRAM STN SPEC: NORMAL
GROSS: NORMAL
HBA1C MFR BLD: 5.5 % (ref 4–5.6)
HCT VFR BLD AUTO: 19.1 % (ref 40–54)
HCT VFR BLD AUTO: 19.8 % (ref 40–54)
HCT VFR BLD AUTO: 20.1 % (ref 40–54)
HCT VFR BLD AUTO: 20.5 % (ref 40–54)
HCT VFR BLD AUTO: 21.1 % (ref 40–54)
HCT VFR BLD AUTO: 21.9 % (ref 40–54)
HCT VFR BLD AUTO: 22.4 % (ref 40–54)
HCT VFR BLD AUTO: 23.5 % (ref 40–54)
HCT VFR BLD AUTO: 23.7 % (ref 40–54)
HCT VFR BLD AUTO: 24.2 % (ref 40–54)
HCT VFR BLD AUTO: 25.2 % (ref 40–54)
HCT VFR BLD AUTO: 26.4 % (ref 40–54)
HCT VFR BLD AUTO: 28 % (ref 40–54)
HCT VFR BLD AUTO: 31 % (ref 40–54)
HEPATITIS C VIRUS (HCV) RNA DETECTION/QUANTIFICATION RT-PCR: ABNORMAL IU/ML
HGB BLD-MCNC: 5.9 G/DL (ref 14–18)
HGB BLD-MCNC: 6.3 G/DL (ref 14–18)
HGB BLD-MCNC: 6.7 G/DL (ref 14–18)
HGB BLD-MCNC: 6.9 G/DL (ref 14–18)
HGB BLD-MCNC: 7.1 G/DL (ref 14–18)
HGB BLD-MCNC: 7.7 G/DL (ref 14–18)
HGB BLD-MCNC: 7.9 G/DL (ref 14–18)
HGB BLD-MCNC: 8.1 G/DL (ref 14–18)
HGB BLD-MCNC: 8.1 G/DL (ref 14–18)
HGB BLD-MCNC: 8.5 G/DL (ref 14–18)
HGB BLD-MCNC: 8.9 G/DL (ref 14–18)
HGB BLD-MCNC: 9.5 G/DL (ref 14–18)
HGB UR QL STRIP: ABNORMAL
HGB UR QL STRIP: NEGATIVE
HGB UR QL STRIP: NEGATIVE
HIV 1+2 AB+HIV1 P24 AG SERPL QL IA: NEGATIVE
HYALINE CASTS UR QL AUTO: 0 /LPF
HYPOCHROMIA BLD QL SMEAR: ABNORMAL
IMM GRANULOCYTES # BLD AUTO: 0.04 K/UL (ref 0–0.04)
IMM GRANULOCYTES # BLD AUTO: 0.12 K/UL (ref 0–0.04)
IMM GRANULOCYTES # BLD AUTO: 0.26 K/UL (ref 0–0.04)
IMM GRANULOCYTES # BLD AUTO: 0.45 K/UL (ref 0–0.04)
IMM GRANULOCYTES # BLD AUTO: 0.45 K/UL (ref 0–0.04)
IMM GRANULOCYTES # BLD AUTO: 0.46 K/UL (ref 0–0.04)
IMM GRANULOCYTES # BLD AUTO: 0.58 K/UL (ref 0–0.04)
IMM GRANULOCYTES # BLD AUTO: 0.6 K/UL (ref 0–0.04)
IMM GRANULOCYTES # BLD AUTO: 0.73 K/UL (ref 0–0.04)
IMM GRANULOCYTES # BLD AUTO: 0.77 K/UL (ref 0–0.04)
IMM GRANULOCYTES # BLD AUTO: 0.77 K/UL (ref 0–0.04)
IMM GRANULOCYTES # BLD AUTO: 0.86 K/UL (ref 0–0.04)
IMM GRANULOCYTES NFR BLD AUTO: 0.3 % (ref 0–0.5)
IMM GRANULOCYTES NFR BLD AUTO: 0.8 % (ref 0–0.5)
IMM GRANULOCYTES NFR BLD AUTO: 1.7 % (ref 0–0.5)
IMM GRANULOCYTES NFR BLD AUTO: 2.1 % (ref 0–0.5)
IMM GRANULOCYTES NFR BLD AUTO: 2.2 % (ref 0–0.5)
IMM GRANULOCYTES NFR BLD AUTO: 2.3 % (ref 0–0.5)
IMM GRANULOCYTES NFR BLD AUTO: 2.5 % (ref 0–0.5)
IMM GRANULOCYTES NFR BLD AUTO: 2.9 % (ref 0–0.5)
IMM GRANULOCYTES NFR BLD AUTO: 3 % (ref 0–0.5)
IMM GRANULOCYTES NFR BLD AUTO: 3.4 % (ref 0–0.5)
IMM GRANULOCYTES NFR BLD AUTO: 4 % (ref 0–0.5)
IMM GRANULOCYTES NFR BLD AUTO: 4.3 % (ref 0–0.5)
KETONES UR QL STRIP: ABNORMAL
KETONES UR QL STRIP: NEGATIVE
KETONES UR QL STRIP: NEGATIVE
LACTATE SERPL-SCNC: 1.1 MMOL/L (ref 0.5–2.2)
LEUKOCYTE ESTERASE UR QL STRIP: ABNORMAL
LEUKOCYTE ESTERASE UR QL STRIP: NEGATIVE
LEUKOCYTE ESTERASE UR QL STRIP: NEGATIVE
LYMPHOCYTES # BLD AUTO: 1.3 K/UL (ref 1–4.8)
LYMPHOCYTES # BLD AUTO: 1.4 K/UL (ref 1–4.8)
LYMPHOCYTES # BLD AUTO: 1.5 K/UL (ref 1–4.8)
LYMPHOCYTES # BLD AUTO: 2.1 K/UL (ref 1–4.8)
LYMPHOCYTES # BLD AUTO: 2.1 K/UL (ref 1–4.8)
LYMPHOCYTES # BLD AUTO: 2.3 K/UL (ref 1–4.8)
LYMPHOCYTES # BLD AUTO: 2.5 K/UL (ref 1–4.8)
LYMPHOCYTES # BLD AUTO: 2.5 K/UL (ref 1–4.8)
LYMPHOCYTES # BLD AUTO: 2.6 K/UL (ref 1–4.8)
LYMPHOCYTES # BLD AUTO: 2.8 K/UL (ref 1–4.8)
LYMPHOCYTES # BLD AUTO: 2.8 K/UL (ref 1–4.8)
LYMPHOCYTES # BLD AUTO: 2.9 K/UL (ref 1–4.8)
LYMPHOCYTES NFR BLD: 10 % (ref 18–48)
LYMPHOCYTES NFR BLD: 10.7 % (ref 18–48)
LYMPHOCYTES NFR BLD: 11.3 % (ref 18–48)
LYMPHOCYTES NFR BLD: 11.4 % (ref 18–48)
LYMPHOCYTES NFR BLD: 12.2 % (ref 18–48)
LYMPHOCYTES NFR BLD: 12.3 % (ref 18–48)
LYMPHOCYTES NFR BLD: 12.7 % (ref 18–48)
LYMPHOCYTES NFR BLD: 14.6 % (ref 18–48)
LYMPHOCYTES NFR BLD: 16.4 % (ref 18–48)
LYMPHOCYTES NFR BLD: 6.7 % (ref 18–48)
LYMPHOCYTES NFR BLD: 9.6 % (ref 18–48)
LYMPHOCYTES NFR BLD: 9.9 % (ref 18–48)
Lab: NORMAL
Lab: NORMAL
MAGNESIUM SERPL-MCNC: 1.4 MG/DL (ref 1.6–2.6)
MAGNESIUM SERPL-MCNC: 1.5 MG/DL (ref 1.6–2.6)
MAGNESIUM SERPL-MCNC: 1.6 MG/DL (ref 1.6–2.6)
MAGNESIUM SERPL-MCNC: 1.7 MG/DL (ref 1.6–2.6)
MAGNESIUM SERPL-MCNC: 1.8 MG/DL (ref 1.6–2.6)
MCH RBC QN AUTO: 27.3 PG (ref 27–31)
MCH RBC QN AUTO: 28 PG (ref 27–31)
MCH RBC QN AUTO: 28 PG (ref 27–31)
MCH RBC QN AUTO: 28.2 PG (ref 27–31)
MCH RBC QN AUTO: 28.3 PG (ref 27–31)
MCH RBC QN AUTO: 28.6 PG (ref 27–31)
MCH RBC QN AUTO: 28.7 PG (ref 27–31)
MCH RBC QN AUTO: 28.8 PG (ref 27–31)
MCH RBC QN AUTO: 29.1 PG (ref 27–31)
MCH RBC QN AUTO: 29.9 PG (ref 27–31)
MCHC RBC AUTO-ENTMCNC: 30.6 G/DL (ref 32–36)
MCHC RBC AUTO-ENTMCNC: 30.8 G/DL (ref 32–36)
MCHC RBC AUTO-ENTMCNC: 30.9 G/DL (ref 32–36)
MCHC RBC AUTO-ENTMCNC: 31.8 G/DL (ref 32–36)
MCHC RBC AUTO-ENTMCNC: 32.1 G/DL (ref 32–36)
MCHC RBC AUTO-ENTMCNC: 32.2 G/DL (ref 32–36)
MCHC RBC AUTO-ENTMCNC: 32.4 G/DL (ref 32–36)
MCHC RBC AUTO-ENTMCNC: 32.6 G/DL (ref 32–36)
MCHC RBC AUTO-ENTMCNC: 33.3 G/DL (ref 32–36)
MCHC RBC AUTO-ENTMCNC: 34.5 G/DL (ref 32–36)
MCV RBC AUTO: 84 FL (ref 82–98)
MCV RBC AUTO: 86 FL (ref 82–98)
MCV RBC AUTO: 86 FL (ref 82–98)
MCV RBC AUTO: 88 FL (ref 82–98)
MCV RBC AUTO: 89 FL (ref 82–98)
MCV RBC AUTO: 90 FL (ref 82–98)
MCV RBC AUTO: 90 FL (ref 82–98)
MCV RBC AUTO: 91 FL (ref 82–98)
MCV RBC AUTO: 92 FL (ref 82–98)
MCV RBC AUTO: 95 FL (ref 82–98)
METHADONE UR QL SCN>300 NG/ML: ABNORMAL
METHADONE UR QL SCN>300 NG/ML: NEGATIVE
METHADONE UR QL SCN>300 NG/ML: NEGATIVE
MICROSCOPIC COMMENT: ABNORMAL
MICROSCOPIC COMMENT: ABNORMAL
MICROSCOPIC COMMENT: NORMAL
MONOCYTES # BLD AUTO: 1.4 K/UL (ref 0.3–1)
MONOCYTES # BLD AUTO: 1.6 K/UL (ref 0.3–1)
MONOCYTES # BLD AUTO: 1.7 K/UL (ref 0.3–1)
MONOCYTES # BLD AUTO: 1.9 K/UL (ref 0.3–1)
MONOCYTES # BLD AUTO: 1.9 K/UL (ref 0.3–1)
MONOCYTES # BLD AUTO: 2.2 K/UL (ref 0.3–1)
MONOCYTES # BLD AUTO: 2.3 K/UL (ref 0.3–1)
MONOCYTES # BLD AUTO: 2.5 K/UL (ref 0.3–1)
MONOCYTES # BLD AUTO: 2.5 K/UL (ref 0.3–1)
MONOCYTES NFR BLD: 10 % (ref 4–15)
MONOCYTES NFR BLD: 10 % (ref 4–15)
MONOCYTES NFR BLD: 10.5 % (ref 4–15)
MONOCYTES NFR BLD: 10.7 % (ref 4–15)
MONOCYTES NFR BLD: 10.9 % (ref 4–15)
MONOCYTES NFR BLD: 12 % (ref 4–15)
MONOCYTES NFR BLD: 12.2 % (ref 4–15)
MONOCYTES NFR BLD: 14.8 % (ref 4–15)
MONOCYTES NFR BLD: 9.1 % (ref 4–15)
MONOCYTES NFR BLD: 9.1 % (ref 4–15)
MONOCYTES NFR BLD: 9.7 % (ref 4–15)
MONOCYTES NFR BLD: 9.9 % (ref 4–15)
NEUTROPHILS # BLD AUTO: 10.6 K/UL (ref 1.8–7.7)
NEUTROPHILS # BLD AUTO: 11.9 K/UL (ref 1.8–7.7)
NEUTROPHILS # BLD AUTO: 12.1 K/UL (ref 1.8–7.7)
NEUTROPHILS # BLD AUTO: 13.6 K/UL (ref 1.8–7.7)
NEUTROPHILS # BLD AUTO: 15.5 K/UL (ref 1.8–7.7)
NEUTROPHILS # BLD AUTO: 15.5 K/UL (ref 1.8–7.7)
NEUTROPHILS # BLD AUTO: 16 K/UL (ref 1.8–7.7)
NEUTROPHILS # BLD AUTO: 16.5 K/UL (ref 1.8–7.7)
NEUTROPHILS # BLD AUTO: 16.8 K/UL (ref 1.8–7.7)
NEUTROPHILS # BLD AUTO: 16.8 K/UL (ref 1.8–7.7)
NEUTROPHILS # BLD AUTO: 19.4 K/UL (ref 1.8–7.7)
NEUTROPHILS # BLD AUTO: 8.2 K/UL (ref 1.8–7.7)
NEUTROPHILS NFR BLD: 68.1 % (ref 38–73)
NEUTROPHILS NFR BLD: 68.3 % (ref 38–73)
NEUTROPHILS NFR BLD: 70.5 % (ref 38–73)
NEUTROPHILS NFR BLD: 71.9 % (ref 38–73)
NEUTROPHILS NFR BLD: 72.6 % (ref 38–73)
NEUTROPHILS NFR BLD: 72.7 % (ref 38–73)
NEUTROPHILS NFR BLD: 73.8 % (ref 38–73)
NEUTROPHILS NFR BLD: 73.9 % (ref 38–73)
NEUTROPHILS NFR BLD: 74.9 % (ref 38–73)
NEUTROPHILS NFR BLD: 76.2 % (ref 38–73)
NEUTROPHILS NFR BLD: 76.3 % (ref 38–73)
NEUTROPHILS NFR BLD: 79.9 % (ref 38–73)
NITRITE UR QL STRIP: NEGATIVE
NITRITE UR QL STRIP: NEGATIVE
NITRITE UR QL STRIP: POSITIVE
NRBC BLD-RTO: 0 /100 WBC
NT-PROBNP SERPL-MCNC: 3680 PG/ML (ref 5–450)
NUM UNITS TRANS PACKED RBC: NORMAL
OPIATES UR QL SCN: ABNORMAL
OPIATES UR QL SCN: ABNORMAL
OPIATES UR QL SCN: NEGATIVE
OVALOCYTES BLD QL SMEAR: ABNORMAL
PCP UR QL SCN>25 NG/ML: NEGATIVE
PH UR STRIP: 5 [PH] (ref 5–8)
PHOSPHATE SERPL-MCNC: 2.4 MG/DL (ref 2.7–4.5)
PHOSPHATE SERPL-MCNC: 2.4 MG/DL (ref 2.7–4.5)
PHOSPHATE SERPL-MCNC: 3 MG/DL (ref 2.7–4.5)
PHOSPHATE SERPL-MCNC: 3.1 MG/DL (ref 2.7–4.5)
PHOSPHATE SERPL-MCNC: 3.2 MG/DL (ref 2.7–4.5)
PHOSPHATE SERPL-MCNC: 3.4 MG/DL (ref 2.7–4.5)
PHOSPHATE SERPL-MCNC: 3.7 MG/DL (ref 2.7–4.5)
PHOSPHATE SERPL-MCNC: 3.8 MG/DL (ref 2.7–4.5)
PLATELET # BLD AUTO: 184 K/UL (ref 150–450)
PLATELET # BLD AUTO: 262 K/UL (ref 150–450)
PLATELET # BLD AUTO: 276 K/UL (ref 150–450)
PLATELET # BLD AUTO: 277 K/UL (ref 150–450)
PLATELET # BLD AUTO: 280 K/UL (ref 150–450)
PLATELET # BLD AUTO: 281 K/UL (ref 150–450)
PLATELET # BLD AUTO: 288 K/UL (ref 150–450)
PLATELET # BLD AUTO: 299 K/UL (ref 150–450)
PLATELET # BLD AUTO: 311 K/UL (ref 150–450)
PLATELET # BLD AUTO: 313 K/UL (ref 150–450)
PLATELET # BLD AUTO: 316 K/UL (ref 150–450)
PLATELET # BLD AUTO: 339 K/UL (ref 150–450)
PLATELET BLD QL SMEAR: ABNORMAL
PMV BLD AUTO: 8.6 FL (ref 9.2–12.9)
PMV BLD AUTO: 8.9 FL (ref 9.2–12.9)
PMV BLD AUTO: 8.9 FL (ref 9.2–12.9)
PMV BLD AUTO: 9 FL (ref 9.2–12.9)
PMV BLD AUTO: 9.1 FL (ref 9.2–12.9)
PMV BLD AUTO: 9.2 FL (ref 9.2–12.9)
PMV BLD AUTO: 9.2 FL (ref 9.2–12.9)
PMV BLD AUTO: 9.3 FL (ref 9.2–12.9)
PMV BLD AUTO: 9.4 FL (ref 9.2–12.9)
PMV BLD AUTO: 9.5 FL (ref 9.2–12.9)
POCT GLUCOSE: 100 MG/DL (ref 70–110)
POCT GLUCOSE: 101 MG/DL (ref 70–110)
POCT GLUCOSE: 105 MG/DL (ref 70–110)
POCT GLUCOSE: 108 MG/DL (ref 70–110)
POCT GLUCOSE: 114 MG/DL (ref 70–110)
POCT GLUCOSE: 124 MG/DL (ref 70–110)
POCT GLUCOSE: 142 MG/DL (ref 70–110)
POCT GLUCOSE: 153 MG/DL (ref 70–110)
POCT GLUCOSE: 154 MG/DL (ref 70–110)
POCT GLUCOSE: 155 MG/DL (ref 70–110)
POCT GLUCOSE: 157 MG/DL (ref 70–110)
POCT GLUCOSE: 159 MG/DL (ref 70–110)
POCT GLUCOSE: 163 MG/DL (ref 70–110)
POCT GLUCOSE: 184 MG/DL (ref 70–110)
POCT GLUCOSE: 185 MG/DL (ref 70–110)
POCT GLUCOSE: 186 MG/DL (ref 70–110)
POCT GLUCOSE: 216 MG/DL (ref 70–110)
POCT GLUCOSE: 216 MG/DL (ref 70–110)
POCT GLUCOSE: 228 MG/DL (ref 70–110)
POCT GLUCOSE: 228 MG/DL (ref 70–110)
POCT GLUCOSE: 239 MG/DL (ref 70–110)
POCT GLUCOSE: 70 MG/DL (ref 70–110)
POCT GLUCOSE: 75 MG/DL (ref 70–110)
POCT GLUCOSE: 78 MG/DL (ref 70–110)
POCT GLUCOSE: 87 MG/DL (ref 70–110)
POCT GLUCOSE: 91 MG/DL (ref 70–110)
POCT GLUCOSE: 93 MG/DL (ref 70–110)
POCT GLUCOSE: 93 MG/DL (ref 70–110)
POCT GLUCOSE: 97 MG/DL (ref 70–110)
POCT GLUCOSE: 99 MG/DL (ref 70–110)
POIKILOCYTOSIS BLD QL SMEAR: SLIGHT
POLYCHROMASIA BLD QL SMEAR: ABNORMAL
POTASSIUM SERPL-SCNC: 3.5 MMOL/L (ref 3.5–5.1)
POTASSIUM SERPL-SCNC: 3.8 MMOL/L (ref 3.5–5.1)
POTASSIUM SERPL-SCNC: 3.9 MMOL/L (ref 3.5–5.1)
POTASSIUM SERPL-SCNC: 3.9 MMOL/L (ref 3.5–5.1)
POTASSIUM SERPL-SCNC: 4 MMOL/L (ref 3.5–5.1)
POTASSIUM SERPL-SCNC: 4.1 MMOL/L (ref 3.5–5.1)
POTASSIUM SERPL-SCNC: 4.1 MMOL/L (ref 3.5–5.1)
POTASSIUM SERPL-SCNC: 4.2 MMOL/L (ref 3.5–5.1)
POTASSIUM SERPL-SCNC: 4.3 MMOL/L (ref 3.5–5.1)
POTASSIUM SERPL-SCNC: 4.5 MMOL/L (ref 3.5–5.1)
PROT SERPL-MCNC: 5.9 G/DL (ref 6–8.4)
PROT SERPL-MCNC: 6 G/DL (ref 6–8.4)
PROT SERPL-MCNC: 6.3 G/DL (ref 6–8.4)
PROT SERPL-MCNC: 6.5 G/DL (ref 6–8.4)
PROT SERPL-MCNC: 6.9 G/DL (ref 6–8.4)
PROT SERPL-MCNC: 7.1 G/DL (ref 6–8.4)
PROT SERPL-MCNC: 7.2 G/DL (ref 6–8.4)
PROT SERPL-MCNC: 7.3 G/DL (ref 6–8.4)
PROT SERPL-MCNC: 7.4 G/DL (ref 6–8.4)
PROT SERPL-MCNC: 7.6 G/DL (ref 6–8.4)
PROT UR QL STRIP: ABNORMAL
RBC # BLD AUTO: 2.11 M/UL (ref 4.6–6.2)
RBC # BLD AUTO: 2.2 M/UL (ref 4.6–6.2)
RBC # BLD AUTO: 2.24 M/UL (ref 4.6–6.2)
RBC # BLD AUTO: 2.45 M/UL (ref 4.6–6.2)
RBC # BLD AUTO: 2.46 M/UL (ref 4.6–6.2)
RBC # BLD AUTO: 2.47 M/UL (ref 4.6–6.2)
RBC # BLD AUTO: 2.78 M/UL (ref 4.6–6.2)
RBC # BLD AUTO: 2.8 M/UL (ref 4.6–6.2)
RBC # BLD AUTO: 2.81 M/UL (ref 4.6–6.2)
RBC # BLD AUTO: 3 M/UL (ref 4.6–6.2)
RBC # BLD AUTO: 3.06 M/UL (ref 4.6–6.2)
RBC # BLD AUTO: 3.26 M/UL (ref 4.6–6.2)
RBC #/AREA URNS AUTO: 1 /HPF (ref 0–4)
RBC #/AREA URNS AUTO: 1 /HPF (ref 0–4)
RBC #/AREA URNS AUTO: 3 /HPF (ref 0–4)
SARS-COV-2 RDRP RESP QL NAA+PROBE: NEGATIVE
SARS-COV-2 RDRP RESP QL NAA+PROBE: NEGATIVE
SODIUM SERPL-SCNC: 129 MMOL/L (ref 136–145)
SODIUM SERPL-SCNC: 131 MMOL/L (ref 136–145)
SODIUM SERPL-SCNC: 131 MMOL/L (ref 136–145)
SODIUM SERPL-SCNC: 132 MMOL/L (ref 136–145)
SODIUM SERPL-SCNC: 132 MMOL/L (ref 136–145)
SODIUM SERPL-SCNC: 133 MMOL/L (ref 136–145)
SODIUM SERPL-SCNC: 133 MMOL/L (ref 136–145)
SODIUM SERPL-SCNC: 134 MMOL/L (ref 136–145)
SODIUM SERPL-SCNC: 135 MMOL/L (ref 136–145)
SODIUM SERPL-SCNC: 135 MMOL/L (ref 136–145)
SP GR UR STRIP: 1.02 (ref 1–1.03)
SPHEROCYTES BLD QL SMEAR: ABNORMAL
SPHEROCYTES BLD QL SMEAR: ABNORMAL
TOXICOLOGY INFORMATION: ABNORMAL
TRANS ERYTHROCYTES VOL PATIENT: NORMAL ML
TRANS ERYTHROCYTES VOL PATIENT: NORMAL ML
TROPONIN I SERPL-MCNC: 0.02 NG/ML (ref 0.01–0.03)
TSH SERPL DL<=0.005 MIU/L-ACNC: 1.35 UIU/ML (ref 0.4–4)
URN SPEC COLLECT METH UR: ABNORMAL
UROBILINOGEN UR STRIP-ACNC: ABNORMAL EU/DL
UUN UR-MCNC: 18 MG/DL (ref 2–20)
UUN UR-MCNC: 26 MG/DL (ref 2–20)
VANCOMYCIN SERPL-MCNC: 13 UG/ML
VANCOMYCIN SERPL-MCNC: 25.6 UG/ML
VANCOMYCIN TROUGH SERPL-MCNC: 19.8 UG/ML (ref 10–22)
VANCOMYCIN TROUGH SERPL-MCNC: 33.5 UG/ML (ref 10–22)
WBC # BLD AUTO: 11.44 K/UL (ref 3.9–12.7)
WBC # BLD AUTO: 15.51 K/UL (ref 3.9–12.7)
WBC # BLD AUTO: 15.8 K/UL (ref 3.9–12.7)
WBC # BLD AUTO: 16.88 K/UL (ref 3.9–12.7)
WBC # BLD AUTO: 19.91 K/UL (ref 3.9–12.7)
WBC # BLD AUTO: 20.73 K/UL (ref 3.9–12.7)
WBC # BLD AUTO: 21.05 K/UL (ref 3.9–12.7)
WBC # BLD AUTO: 21.38 K/UL (ref 3.9–12.7)
WBC # BLD AUTO: 21.63 K/UL (ref 3.9–12.7)
WBC # BLD AUTO: 22.65 K/UL (ref 3.9–12.7)
WBC # BLD AUTO: 22.8 K/UL (ref 3.9–12.7)
WBC # BLD AUTO: 25.44 K/UL (ref 3.9–12.7)
WBC #/AREA URNS AUTO: 1 /HPF (ref 0–5)
WBC #/AREA URNS AUTO: 1 /HPF (ref 0–5)
WBC #/AREA URNS AUTO: 25 /HPF (ref 0–5)

## 2021-01-01 PROCEDURE — 99233 SBSQ HOSP IP/OBS HIGH 50: CPT | Mod: ,,, | Performed by: HOSPITALIST

## 2021-01-01 PROCEDURE — 99233 PR SUBSEQUENT HOSPITAL CARE,LEVL III: ICD-10-PCS | Mod: ,,, | Performed by: HOSPITALIST

## 2021-01-01 PROCEDURE — 94760 N-INVAS EAR/PLS OXIMETRY 1: CPT

## 2021-01-01 PROCEDURE — 36415 COLL VENOUS BLD VENIPUNCTURE: CPT | Performed by: STUDENT IN AN ORGANIZED HEALTH CARE EDUCATION/TRAINING PROGRAM

## 2021-01-01 PROCEDURE — 25000003 PHARM REV CODE 250: Performed by: STUDENT IN AN ORGANIZED HEALTH CARE EDUCATION/TRAINING PROGRAM

## 2021-01-01 PROCEDURE — 83036 HEMOGLOBIN GLYCOSYLATED A1C: CPT

## 2021-01-01 PROCEDURE — 99283 EMERGENCY DEPT VISIT LOW MDM: CPT

## 2021-01-01 PROCEDURE — 82962 GLUCOSE BLOOD TEST: CPT | Mod: ER

## 2021-01-01 PROCEDURE — 87015 SPECIMEN INFECT AGNT CONCNTJ: CPT | Mod: 59 | Performed by: PODIATRIST

## 2021-01-01 PROCEDURE — 97530 THERAPEUTIC ACTIVITIES: CPT

## 2021-01-01 PROCEDURE — 80053 COMPREHEN METABOLIC PANEL: CPT

## 2021-01-01 PROCEDURE — P9016 RBC LEUKOCYTES REDUCED: HCPCS | Performed by: STUDENT IN AN ORGANIZED HEALTH CARE EDUCATION/TRAINING PROGRAM

## 2021-01-01 PROCEDURE — 25000003 PHARM REV CODE 250: Mod: ER | Performed by: EMERGENCY MEDICINE

## 2021-01-01 PROCEDURE — S4991 NICOTINE PATCH NONLEGEND: HCPCS

## 2021-01-01 PROCEDURE — 99233 PR SUBSEQUENT HOSPITAL CARE,LEVL III: ICD-10-PCS | Mod: ,,, | Performed by: STUDENT IN AN ORGANIZED HEALTH CARE EDUCATION/TRAINING PROGRAM

## 2021-01-01 PROCEDURE — 99223 1ST HOSP IP/OBS HIGH 75: CPT | Mod: ,,, | Performed by: PHYSICIAN ASSISTANT

## 2021-01-01 PROCEDURE — 36415 COLL VENOUS BLD VENIPUNCTURE: CPT | Performed by: PHYSICIAN ASSISTANT

## 2021-01-01 PROCEDURE — 99232 SBSQ HOSP IP/OBS MODERATE 35: CPT | Mod: AF,HB,, | Performed by: PSYCHIATRY & NEUROLOGY

## 2021-01-01 PROCEDURE — 84100 ASSAY OF PHOSPHORUS: CPT

## 2021-01-01 PROCEDURE — C1751 CATH, INF, PER/CENT/MIDLINE: HCPCS

## 2021-01-01 PROCEDURE — 99233 SBSQ HOSP IP/OBS HIGH 50: CPT | Mod: 24,,, | Performed by: PODIATRIST

## 2021-01-01 PROCEDURE — 28003 TREATMENT OF FOOT INFECTION: CPT | Mod: RT,,, | Performed by: PODIATRIST

## 2021-01-01 PROCEDURE — 64445 SCIATIC SS: ICD-10-PCS | Mod: 59,RT,, | Performed by: ANESTHESIOLOGY

## 2021-01-01 PROCEDURE — 99223 PR INITIAL HOSPITAL CARE,LEVL III: ICD-10-PCS | Mod: ,,, | Performed by: HOSPITALIST

## 2021-01-01 PROCEDURE — 86920 COMPATIBILITY TEST SPIN: CPT

## 2021-01-01 PROCEDURE — 96374 THER/PROPH/DIAG INJ IV PUSH: CPT | Mod: ER

## 2021-01-01 PROCEDURE — D9220A PRA ANESTHESIA: Mod: ANES,,, | Performed by: STUDENT IN AN ORGANIZED HEALTH CARE EDUCATION/TRAINING PROGRAM

## 2021-01-01 PROCEDURE — 36415 COLL VENOUS BLD VENIPUNCTURE: CPT

## 2021-01-01 PROCEDURE — 25000003 PHARM REV CODE 250: Performed by: EMERGENCY MEDICINE

## 2021-01-01 PROCEDURE — D9220A PRA ANESTHESIA: ICD-10-PCS | Mod: ANES,,, | Performed by: ANESTHESIOLOGY

## 2021-01-01 PROCEDURE — 25000003 PHARM REV CODE 250: Performed by: PODIATRIST

## 2021-01-01 PROCEDURE — 99024 PR POST-OP FOLLOW-UP VISIT: ICD-10-PCS | Mod: ,,, | Performed by: SURGERY

## 2021-01-01 PROCEDURE — 87070 CULTURE OTHR SPECIMN AEROBIC: CPT | Mod: ER | Performed by: EMERGENCY MEDICINE

## 2021-01-01 PROCEDURE — 87076 CULTURE ANAEROBE IDENT EACH: CPT | Mod: 59 | Performed by: PODIATRIST

## 2021-01-01 PROCEDURE — D9220A PRA ANESTHESIA: Mod: CRNA,,, | Performed by: NURSE ANESTHETIST, CERTIFIED REGISTERED

## 2021-01-01 PROCEDURE — 25000003 PHARM REV CODE 250

## 2021-01-01 PROCEDURE — 87206 SMEAR FLUORESCENT/ACID STAI: CPT | Performed by: PODIATRIST

## 2021-01-01 PROCEDURE — 87088 URINE BACTERIA CULTURE: CPT | Mod: ER | Performed by: EMERGENCY MEDICINE

## 2021-01-01 PROCEDURE — 36000705 HC OR TIME LEV I EA ADD 15 MIN: Performed by: PODIATRIST

## 2021-01-01 PROCEDURE — 37000009 HC ANESTHESIA EA ADD 15 MINS: Performed by: PODIATRIST

## 2021-01-01 PROCEDURE — 63600175 PHARM REV CODE 636 W HCPCS: Performed by: STUDENT IN AN ORGANIZED HEALTH CARE EDUCATION/TRAINING PROGRAM

## 2021-01-01 PROCEDURE — 83880 ASSAY OF NATRIURETIC PEPTIDE: CPT | Mod: ER | Performed by: EMERGENCY MEDICINE

## 2021-01-01 PROCEDURE — 37000008 HC ANESTHESIA 1ST 15 MINUTES: Performed by: SURGERY

## 2021-01-01 PROCEDURE — 99239 PR HOSPITAL DISCHARGE DAY,>30 MIN: ICD-10-PCS | Mod: ,,, | Performed by: STUDENT IN AN ORGANIZED HEALTH CARE EDUCATION/TRAINING PROGRAM

## 2021-01-01 PROCEDURE — 63600175 PHARM REV CODE 636 W HCPCS: Performed by: NURSE ANESTHETIST, CERTIFIED REGISTERED

## 2021-01-01 PROCEDURE — 63600175 PHARM REV CODE 636 W HCPCS

## 2021-01-01 PROCEDURE — 97164 PT RE-EVAL EST PLAN CARE: CPT

## 2021-01-01 PROCEDURE — 82962 GLUCOSE BLOOD TEST: CPT | Performed by: SURGERY

## 2021-01-01 PROCEDURE — 80307 DRUG TEST PRSMV CHEM ANLYZR: CPT

## 2021-01-01 PROCEDURE — 36430 TRANSFUSION BLD/BLD COMPNT: CPT

## 2021-01-01 PROCEDURE — 88307 TISSUE EXAM BY PATHOLOGIST: CPT | Mod: 26,,, | Performed by: PATHOLOGY

## 2021-01-01 PROCEDURE — 97165 OT EVAL LOW COMPLEX 30 MIN: CPT

## 2021-01-01 PROCEDURE — 99223 1ST HOSP IP/OBS HIGH 75: CPT | Mod: 57,,, | Performed by: SURGERY

## 2021-01-01 PROCEDURE — 96367 TX/PROPH/DG ADDL SEQ IV INF: CPT | Mod: ER

## 2021-01-01 PROCEDURE — 90832 PSYTX W PT 30 MINUTES: CPT | Mod: AF,HB,, | Performed by: STUDENT IN AN ORGANIZED HEALTH CARE EDUCATION/TRAINING PROGRAM

## 2021-01-01 PROCEDURE — 87076 CULTURE ANAEROBE IDENT EACH: CPT | Mod: 59 | Performed by: HOSPITALIST

## 2021-01-01 PROCEDURE — 87116 MYCOBACTERIA CULTURE: CPT | Mod: 59 | Performed by: HOSPITALIST

## 2021-01-01 PROCEDURE — 87206 SMEAR FLUORESCENT/ACID STAI: CPT | Mod: 91 | Performed by: HOSPITALIST

## 2021-01-01 PROCEDURE — 97116 GAIT TRAINING THERAPY: CPT

## 2021-01-01 PROCEDURE — 99223 1ST HOSP IP/OBS HIGH 75: CPT | Mod: ,,, | Performed by: HOSPITALIST

## 2021-01-01 PROCEDURE — 80202 ASSAY OF VANCOMYCIN: CPT | Performed by: STUDENT IN AN ORGANIZED HEALTH CARE EDUCATION/TRAINING PROGRAM

## 2021-01-01 PROCEDURE — 85025 COMPLETE CBC W/AUTO DIFF WBC: CPT | Mod: ER | Performed by: EMERGENCY MEDICINE

## 2021-01-01 PROCEDURE — 76942 SCIATIC SS: ICD-10-PCS | Mod: 26,,, | Performed by: ANESTHESIOLOGY

## 2021-01-01 PROCEDURE — 25500020 PHARM REV CODE 255: Performed by: HOSPITALIST

## 2021-01-01 PROCEDURE — 90792 PSYCH DIAG EVAL W/MED SRVCS: CPT | Mod: AF,HB,, | Performed by: PSYCHIATRY & NEUROLOGY

## 2021-01-01 PROCEDURE — 80307 DRUG TEST PRSMV CHEM ANLYZR: CPT | Mod: ER | Performed by: EMERGENCY MEDICINE

## 2021-01-01 PROCEDURE — 71000015 HC POSTOP RECOV 1ST HR: Performed by: PODIATRIST

## 2021-01-01 PROCEDURE — 87186 SC STD MICRODIL/AGAR DIL: CPT | Mod: 59 | Performed by: PODIATRIST

## 2021-01-01 PROCEDURE — 85025 COMPLETE CBC W/AUTO DIFF WBC: CPT | Mod: 91 | Performed by: STUDENT IN AN ORGANIZED HEALTH CARE EDUCATION/TRAINING PROGRAM

## 2021-01-01 PROCEDURE — 88307 PR  SURG PATH,LEVEL V: ICD-10-PCS | Mod: 26,,, | Performed by: PATHOLOGY

## 2021-01-01 PROCEDURE — 87205 SMEAR GRAM STAIN: CPT | Performed by: PODIATRIST

## 2021-01-01 PROCEDURE — 87522 HEPATITIS C REVRS TRNSCRPJ: CPT | Performed by: PHYSICIAN ASSISTANT

## 2021-01-01 PROCEDURE — 87040 BLOOD CULTURE FOR BACTERIA: CPT | Mod: ER | Performed by: EMERGENCY MEDICINE

## 2021-01-01 PROCEDURE — 36000710: Performed by: SURGERY

## 2021-01-01 PROCEDURE — 86920 COMPATIBILITY TEST SPIN: CPT | Performed by: SURGERY

## 2021-01-01 PROCEDURE — 87015 SPECIMEN INFECT AGNT CONCNTJ: CPT | Performed by: HOSPITALIST

## 2021-01-01 PROCEDURE — 25000003 PHARM REV CODE 250: Performed by: ANESTHESIOLOGY

## 2021-01-01 PROCEDURE — 76942 ECHO GUIDE FOR BIOPSY: CPT | Mod: 26,,, | Performed by: ANESTHESIOLOGY

## 2021-01-01 PROCEDURE — 76942 ECHO GUIDE FOR BIOPSY: CPT | Performed by: STUDENT IN AN ORGANIZED HEALTH CARE EDUCATION/TRAINING PROGRAM

## 2021-01-01 PROCEDURE — 36415 COLL VENOUS BLD VENIPUNCTURE: CPT | Performed by: HOSPITALIST

## 2021-01-01 PROCEDURE — 36000704 HC OR TIME LEV I 1ST 15 MIN: Performed by: PODIATRIST

## 2021-01-01 PROCEDURE — 87186 SC STD MICRODIL/AGAR DIL: CPT | Mod: 59 | Performed by: HOSPITALIST

## 2021-01-01 PROCEDURE — 91300 PHARM REV CODE 636 W HCPCS: CPT | Performed by: STUDENT IN AN ORGANIZED HEALTH CARE EDUCATION/TRAINING PROGRAM

## 2021-01-01 PROCEDURE — 85025 COMPLETE CBC W/AUTO DIFF WBC: CPT

## 2021-01-01 PROCEDURE — 99233 PR SUBSEQUENT HOSPITAL CARE,LEVL III: ICD-10-PCS | Mod: 24,,, | Performed by: PODIATRIST

## 2021-01-01 PROCEDURE — 64448: ICD-10-PCS | Mod: 59,RT,, | Performed by: ANESTHESIOLOGY

## 2021-01-01 PROCEDURE — 80053 COMPREHEN METABOLIC PANEL: CPT | Mod: ER | Performed by: EMERGENCY MEDICINE

## 2021-01-01 PROCEDURE — 99284 EMERGENCY DEPT VISIT MOD MDM: CPT | Mod: 25,ER

## 2021-01-01 PROCEDURE — 85018 HEMOGLOBIN: CPT

## 2021-01-01 PROCEDURE — 88307 TISSUE EXAM BY PATHOLOGIST: CPT | Mod: 26,,, | Performed by: STUDENT IN AN ORGANIZED HEALTH CARE EDUCATION/TRAINING PROGRAM

## 2021-01-01 PROCEDURE — 84484 ASSAY OF TROPONIN QUANT: CPT | Mod: ER | Performed by: EMERGENCY MEDICINE

## 2021-01-01 PROCEDURE — 87077 CULTURE AEROBIC IDENTIFY: CPT | Mod: 59 | Performed by: PODIATRIST

## 2021-01-01 PROCEDURE — 84443 ASSAY THYROID STIM HORMONE: CPT

## 2021-01-01 PROCEDURE — 63600175 PHARM REV CODE 636 W HCPCS: Performed by: PHYSICIAN ASSISTANT

## 2021-01-01 PROCEDURE — 88307 TISSUE EXAM BY PATHOLOGIST: CPT | Performed by: PATHOLOGY

## 2021-01-01 PROCEDURE — 99233 SBSQ HOSP IP/OBS HIGH 50: CPT | Mod: ,,, | Performed by: PHYSICIAN ASSISTANT

## 2021-01-01 PROCEDURE — 1159F MED LIST DOCD IN RCRD: CPT | Mod: CPTII,,, | Performed by: SURGERY

## 2021-01-01 PROCEDURE — 86140 C-REACTIVE PROTEIN: CPT | Mod: ER | Performed by: EMERGENCY MEDICINE

## 2021-01-01 PROCEDURE — 36410 VNPNXR 3YR/> PHY/QHP DX/THER: CPT

## 2021-01-01 PROCEDURE — 3080F PR MOST RECENT DIASTOLIC BLOOD PRESSURE >= 90 MM HG: ICD-10-PCS | Mod: CPTII,,, | Performed by: SURGERY

## 2021-01-01 PROCEDURE — 99900035 HC TECH TIME PER 15 MIN (STAT)

## 2021-01-01 PROCEDURE — 99233 PR SUBSEQUENT HOSPITAL CARE,LEVL III: ICD-10-PCS | Mod: ,,, | Performed by: PHYSICIAN ASSISTANT

## 2021-01-01 PROCEDURE — 88307 TISSUE EXAM BY PATHOLOGIST: CPT | Mod: 59 | Performed by: STUDENT IN AN ORGANIZED HEALTH CARE EDUCATION/TRAINING PROGRAM

## 2021-01-01 PROCEDURE — 87102 FUNGUS ISOLATION CULTURE: CPT | Mod: 59 | Performed by: HOSPITALIST

## 2021-01-01 PROCEDURE — 99285 EMERGENCY DEPT VISIT HI MDM: CPT | Mod: 25,ER

## 2021-01-01 PROCEDURE — 20600001 HC STEP DOWN PRIVATE ROOM

## 2021-01-01 PROCEDURE — D9220A PRA ANESTHESIA: Mod: ANES,,, | Performed by: ANESTHESIOLOGY

## 2021-01-01 PROCEDURE — 99231 PR SUBSEQUENT HOSPITAL CARE,LEVL I: ICD-10-PCS | Mod: GC,,, | Performed by: ANESTHESIOLOGY

## 2021-01-01 PROCEDURE — S4991 NICOTINE PATCH NONLEGEND: HCPCS | Performed by: STUDENT IN AN ORGANIZED HEALTH CARE EDUCATION/TRAINING PROGRAM

## 2021-01-01 PROCEDURE — P9016 RBC LEUKOCYTES REDUCED: HCPCS

## 2021-01-01 PROCEDURE — A9585 GADOBUTROL INJECTION: HCPCS | Performed by: HOSPITALIST

## 2021-01-01 PROCEDURE — 80202 ASSAY OF VANCOMYCIN: CPT | Performed by: HOSPITALIST

## 2021-01-01 PROCEDURE — 99284 EMERGENCY DEPT VISIT MOD MDM: CPT | Mod: ER

## 2021-01-01 PROCEDURE — 87070 CULTURE OTHR SPECIMN AEROBIC: CPT | Mod: 59 | Performed by: HOSPITALIST

## 2021-01-01 PROCEDURE — 94761 N-INVAS EAR/PLS OXIMETRY MLT: CPT

## 2021-01-01 PROCEDURE — 86850 RBC ANTIBODY SCREEN: CPT

## 2021-01-01 PROCEDURE — 87389 HIV-1 AG W/HIV-1&-2 AB AG IA: CPT | Performed by: PHYSICIAN ASSISTANT

## 2021-01-01 PROCEDURE — 85025 COMPLETE CBC W/AUTO DIFF WBC: CPT | Performed by: STUDENT IN AN ORGANIZED HEALTH CARE EDUCATION/TRAINING PROGRAM

## 2021-01-01 PROCEDURE — 87102 FUNGUS ISOLATION CULTURE: CPT | Performed by: PODIATRIST

## 2021-01-01 PROCEDURE — P9016 RBC LEUKOCYTES REDUCED: HCPCS | Performed by: SURGERY

## 2021-01-01 PROCEDURE — 83735 ASSAY OF MAGNESIUM: CPT

## 2021-01-01 PROCEDURE — 64448 NJX AA&/STRD FEM NRV NFS IMG: CPT | Mod: 59,RT,, | Performed by: ANESTHESIOLOGY

## 2021-01-01 PROCEDURE — 99233 SBSQ HOSP IP/OBS HIGH 50: CPT | Mod: ,,, | Performed by: STUDENT IN AN ORGANIZED HEALTH CARE EDUCATION/TRAINING PROGRAM

## 2021-01-01 PROCEDURE — U0002 COVID-19 LAB TEST NON-CDC: HCPCS | Mod: ER | Performed by: EMERGENCY MEDICINE

## 2021-01-01 PROCEDURE — 71000033 HC RECOVERY, INTIAL HOUR: Performed by: SURGERY

## 2021-01-01 PROCEDURE — 3044F PR MOST RECENT HEMOGLOBIN A1C LEVEL <7.0%: ICD-10-PCS | Mod: CPTII,,, | Performed by: SURGERY

## 2021-01-01 PROCEDURE — 96366 THER/PROPH/DIAG IV INF ADDON: CPT | Mod: ER

## 2021-01-01 PROCEDURE — 87205 SMEAR GRAM STAIN: CPT | Mod: 59 | Performed by: HOSPITALIST

## 2021-01-01 PROCEDURE — 99283 EMERGENCY DEPT VISIT LOW MDM: CPT | Mod: 25,ER

## 2021-01-01 PROCEDURE — 99999 PR PBB SHADOW E&M-EST. PATIENT-LVL III: ICD-10-PCS | Mod: PBBFAC,,, | Performed by: SURGERY

## 2021-01-01 PROCEDURE — 99239 HOSP IP/OBS DSCHRG MGMT >30: CPT | Mod: ,,, | Performed by: STUDENT IN AN ORGANIZED HEALTH CARE EDUCATION/TRAINING PROGRAM

## 2021-01-01 PROCEDURE — 1159F PR MEDICATION LIST DOCUMENTED IN MEDICAL RECORD: ICD-10-PCS | Mod: CPTII,,, | Performed by: SURGERY

## 2021-01-01 PROCEDURE — 27590 PR AMPUTATE THIGH,THRU FEMUR: ICD-10-PCS | Mod: RT,,, | Performed by: SURGERY

## 2021-01-01 PROCEDURE — D9220A PRA ANESTHESIA: ICD-10-PCS | Mod: CRNA,,, | Performed by: NURSE ANESTHETIST, CERTIFIED REGISTERED

## 2021-01-01 PROCEDURE — 86900 BLOOD TYPING SEROLOGIC ABO: CPT | Performed by: HOSPITALIST

## 2021-01-01 PROCEDURE — 99232 PR SUBSEQUENT HOSPITAL CARE,LEVL II: ICD-10-PCS | Mod: AF,HB,, | Performed by: PSYCHIATRY & NEUROLOGY

## 2021-01-01 PROCEDURE — 99283 EMERGENCY DEPT VISIT LOW MDM: CPT | Mod: ER

## 2021-01-01 PROCEDURE — 88307 PR  SURG PATH,LEVEL V: ICD-10-PCS | Mod: 26,,, | Performed by: STUDENT IN AN ORGANIZED HEALTH CARE EDUCATION/TRAINING PROGRAM

## 2021-01-01 PROCEDURE — 27201423 OPTIME MED/SURG SUP & DEVICES STERILE SUPPLY: Performed by: PODIATRIST

## 2021-01-01 PROCEDURE — 99284 EMERGENCY DEPT VISIT MOD MDM: CPT | Mod: ,,, | Performed by: EMERGENCY MEDICINE

## 2021-01-01 PROCEDURE — 87075 CULTR BACTERIA EXCEPT BLOOD: CPT | Performed by: PODIATRIST

## 2021-01-01 PROCEDURE — 25000003 PHARM REV CODE 250: Performed by: HOSPITALIST

## 2021-01-01 PROCEDURE — 93005 ELECTROCARDIOGRAM TRACING: CPT | Mod: ER

## 2021-01-01 PROCEDURE — 93010 ELECTROCARDIOGRAM REPORT: CPT | Mod: ,,, | Performed by: INTERNAL MEDICINE

## 2021-01-01 PROCEDURE — 85014 HEMATOCRIT: CPT

## 2021-01-01 PROCEDURE — 85014 HEMATOCRIT: CPT | Performed by: STUDENT IN AN ORGANIZED HEALTH CARE EDUCATION/TRAINING PROGRAM

## 2021-01-01 PROCEDURE — 94799 UNLISTED PULMONARY SVC/PX: CPT

## 2021-01-01 PROCEDURE — 81000 URINALYSIS NONAUTO W/SCOPE: CPT | Mod: 59,ER | Performed by: EMERGENCY MEDICINE

## 2021-01-01 PROCEDURE — 71000016 HC POSTOP RECOV ADDL HR: Performed by: SURGERY

## 2021-01-01 PROCEDURE — 87077 CULTURE AEROBIC IDENTIFY: CPT | Mod: 59,ER | Performed by: EMERGENCY MEDICINE

## 2021-01-01 PROCEDURE — C1729 CATH, DRAINAGE: HCPCS | Performed by: SURGERY

## 2021-01-01 PROCEDURE — 83605 ASSAY OF LACTIC ACID: CPT | Mod: ER | Performed by: EMERGENCY MEDICINE

## 2021-01-01 PROCEDURE — 81000 URINALYSIS NONAUTO W/SCOPE: CPT | Mod: ER | Performed by: EMERGENCY MEDICINE

## 2021-01-01 PROCEDURE — 28003 PR DEEP DISSEC FOOT INFEC,MULTIPLE: ICD-10-PCS | Mod: RT,,, | Performed by: PODIATRIST

## 2021-01-01 PROCEDURE — 90792 PR PSYCHIATRIC DIAGNOSTIC EVALUATION W/MEDICAL SERVICES: ICD-10-PCS | Mod: AF,HB,, | Performed by: PSYCHIATRY & NEUROLOGY

## 2021-01-01 PROCEDURE — U0002 COVID-19 LAB TEST NON-CDC: HCPCS | Performed by: HOSPITALIST

## 2021-01-01 PROCEDURE — 36000711: Performed by: SURGERY

## 2021-01-01 PROCEDURE — 86900 BLOOD TYPING SEROLOGIC ABO: CPT

## 2021-01-01 PROCEDURE — D9220A PRA ANESTHESIA: ICD-10-PCS | Mod: ANES,,, | Performed by: STUDENT IN AN ORGANIZED HEALTH CARE EDUCATION/TRAINING PROGRAM

## 2021-01-01 PROCEDURE — 86920 COMPATIBILITY TEST SPIN: CPT | Performed by: STUDENT IN AN ORGANIZED HEALTH CARE EDUCATION/TRAINING PROGRAM

## 2021-01-01 PROCEDURE — 0001A HC IMMUNIZ ADMIN, SARS-COV-2 COVID-19 VACC, 30MCG/0.3ML, 1ST DOSE: CPT | Performed by: STUDENT IN AN ORGANIZED HEALTH CARE EDUCATION/TRAINING PROGRAM

## 2021-01-01 PROCEDURE — 99999 PR PBB SHADOW E&M-EST. PATIENT-LVL III: CPT | Mod: PBBFAC,,, | Performed by: SURGERY

## 2021-01-01 PROCEDURE — 76937 US GUIDE VASCULAR ACCESS: CPT

## 2021-01-01 PROCEDURE — 90832 PR PSYCHOTHERAPY W/PATIENT, 30 MIN: ICD-10-PCS | Mod: AF,HB,, | Performed by: STUDENT IN AN ORGANIZED HEALTH CARE EDUCATION/TRAINING PROGRAM

## 2021-01-01 PROCEDURE — 87040 BLOOD CULTURE FOR BACTERIA: CPT | Performed by: PHYSICIAN ASSISTANT

## 2021-01-01 PROCEDURE — 97535 SELF CARE MNGMENT TRAINING: CPT

## 2021-01-01 PROCEDURE — 27590 AMPUTATE LEG AT THIGH: CPT | Mod: RT,,, | Performed by: SURGERY

## 2021-01-01 PROCEDURE — 87077 CULTURE AEROBIC IDENTIFY: CPT | Mod: 59 | Performed by: HOSPITALIST

## 2021-01-01 PROCEDURE — P9021 RED BLOOD CELLS UNIT: HCPCS

## 2021-01-01 PROCEDURE — 81000 URINALYSIS NONAUTO W/SCOPE: CPT | Mod: ER,59 | Performed by: EMERGENCY MEDICINE

## 2021-01-01 PROCEDURE — 3044F HG A1C LEVEL LT 7.0%: CPT | Mod: CPTII,,, | Performed by: SURGERY

## 2021-01-01 PROCEDURE — 3080F DIAST BP >= 90 MM HG: CPT | Mod: CPTII,,, | Performed by: SURGERY

## 2021-01-01 PROCEDURE — 63600175 PHARM REV CODE 636 W HCPCS: Mod: ER | Performed by: EMERGENCY MEDICINE

## 2021-01-01 PROCEDURE — 3077F SYST BP >= 140 MM HG: CPT | Mod: CPTII,,, | Performed by: SURGERY

## 2021-01-01 PROCEDURE — 25000003 PHARM REV CODE 250: Performed by: NURSE ANESTHETIST, CERTIFIED REGISTERED

## 2021-01-01 PROCEDURE — 99024 POSTOP FOLLOW-UP VISIT: CPT | Mod: ,,, | Performed by: SURGERY

## 2021-01-01 PROCEDURE — 87116 MYCOBACTERIA CULTURE: CPT | Performed by: PODIATRIST

## 2021-01-01 PROCEDURE — 99284 PR EMERGENCY DEPT VISIT,LEVEL IV: ICD-10-PCS | Mod: ,,, | Performed by: EMERGENCY MEDICINE

## 2021-01-01 PROCEDURE — 87077 CULTURE AEROBIC IDENTIFY: CPT | Mod: ER | Performed by: EMERGENCY MEDICINE

## 2021-01-01 PROCEDURE — 99213 OFFICE O/P EST LOW 20 MIN: CPT | Mod: PBBFAC | Performed by: SURGERY

## 2021-01-01 PROCEDURE — 99223 PR INITIAL HOSPITAL CARE,LEVL III: ICD-10-PCS | Mod: 25,,, | Performed by: PODIATRIST

## 2021-01-01 PROCEDURE — 87186 SC STD MICRODIL/AGAR DIL: CPT | Mod: ER | Performed by: EMERGENCY MEDICINE

## 2021-01-01 PROCEDURE — 20220 PR BONE BIOPSY,TROCAR/NEEDLE SUPERF: ICD-10-PCS | Mod: 51,RT,, | Performed by: PODIATRIST

## 2021-01-01 PROCEDURE — 71000033 HC RECOVERY, INTIAL HOUR: Performed by: PODIATRIST

## 2021-01-01 PROCEDURE — 93010 EKG 12-LEAD: ICD-10-PCS | Mod: ,,, | Performed by: INTERNAL MEDICINE

## 2021-01-01 PROCEDURE — 64445 NJX AA&/STRD SCIATIC NRV IMG: CPT | Mod: 59,RT,, | Performed by: ANESTHESIOLOGY

## 2021-01-01 PROCEDURE — 86901 BLOOD TYPING SEROLOGIC RH(D): CPT

## 2021-01-01 PROCEDURE — 99223 PR INITIAL HOSPITAL CARE,LEVL III: ICD-10-PCS | Mod: 57,,, | Performed by: SURGERY

## 2021-01-01 PROCEDURE — 97161 PT EVAL LOW COMPLEX 20 MIN: CPT

## 2021-01-01 PROCEDURE — 85018 HEMOGLOBIN: CPT | Performed by: STUDENT IN AN ORGANIZED HEALTH CARE EDUCATION/TRAINING PROGRAM

## 2021-01-01 PROCEDURE — 99223 1ST HOSP IP/OBS HIGH 75: CPT | Mod: 25,,, | Performed by: PODIATRIST

## 2021-01-01 PROCEDURE — 87075 CULTR BACTERIA EXCEPT BLOOD: CPT | Mod: 59 | Performed by: HOSPITALIST

## 2021-01-01 PROCEDURE — 37000008 HC ANESTHESIA 1ST 15 MINUTES: Performed by: PODIATRIST

## 2021-01-01 PROCEDURE — 3077F PR MOST RECENT SYSTOLIC BLOOD PRESSURE >= 140 MM HG: ICD-10-PCS | Mod: CPTII,,, | Performed by: SURGERY

## 2021-01-01 PROCEDURE — 96365 THER/PROPH/DIAG IV INF INIT: CPT | Mod: ER

## 2021-01-01 PROCEDURE — 99231 SBSQ HOSP IP/OBS SF/LOW 25: CPT | Mod: GC,,, | Performed by: ANESTHESIOLOGY

## 2021-01-01 PROCEDURE — 71000015 HC POSTOP RECOV 1ST HR: Performed by: SURGERY

## 2021-01-01 PROCEDURE — 63600175 PHARM REV CODE 636 W HCPCS: Performed by: ANESTHESIOLOGY

## 2021-01-01 PROCEDURE — 82962 GLUCOSE BLOOD TEST: CPT | Performed by: PODIATRIST

## 2021-01-01 PROCEDURE — 99223 PR INITIAL HOSPITAL CARE,LEVL III: ICD-10-PCS | Mod: ,,, | Performed by: PHYSICIAN ASSISTANT

## 2021-01-01 PROCEDURE — 87070 CULTURE OTHR SPECIMN AEROBIC: CPT | Performed by: PODIATRIST

## 2021-01-01 PROCEDURE — 87086 URINE CULTURE/COLONY COUNT: CPT | Mod: ER | Performed by: EMERGENCY MEDICINE

## 2021-01-01 PROCEDURE — 20220 BONE BIOPSY TROCAR/NDL SUPFC: CPT | Mod: 51,RT,, | Performed by: PODIATRIST

## 2021-01-01 PROCEDURE — 71000016 HC POSTOP RECOV ADDL HR: Performed by: PODIATRIST

## 2021-01-01 PROCEDURE — 37000009 HC ANESTHESIA EA ADD 15 MINS: Performed by: SURGERY

## 2021-01-01 RX ORDER — MIDAZOLAM HYDROCHLORIDE 1 MG/ML
INJECTION INTRAMUSCULAR; INTRAVENOUS
Status: DISCONTINUED | OUTPATIENT
Start: 2021-01-01 | End: 2021-01-01

## 2021-01-01 RX ORDER — GLUCAGON 1 MG
1 KIT INJECTION
Status: DISCONTINUED | OUTPATIENT
Start: 2021-01-01 | End: 2021-01-01 | Stop reason: HOSPADM

## 2021-01-01 RX ORDER — SYRING-NEEDL,DISP,INSUL,0.3 ML 29 G X1/2"
296 SYRINGE, EMPTY DISPOSABLE MISCELLANEOUS
Status: COMPLETED | OUTPATIENT
Start: 2021-01-01 | End: 2021-01-01

## 2021-01-01 RX ORDER — CIPROFLOXACIN 750 MG/1
750 TABLET, FILM COATED ORAL EVERY 12 HOURS
Qty: 24 TABLET | Refills: 0 | Status: SHIPPED | OUTPATIENT
Start: 2021-01-01 | End: 2021-01-01

## 2021-01-01 RX ORDER — HYDRALAZINE HYDROCHLORIDE 25 MG/1
100 TABLET, FILM COATED ORAL
Status: COMPLETED | OUTPATIENT
Start: 2021-01-01 | End: 2021-01-01

## 2021-01-01 RX ORDER — ONDANSETRON 2 MG/ML
4 INJECTION INTRAMUSCULAR; INTRAVENOUS ONCE AS NEEDED
Status: DISCONTINUED | OUTPATIENT
Start: 2021-01-01 | End: 2021-01-01 | Stop reason: HOSPADM

## 2021-01-01 RX ORDER — ALPRAZOLAM 2 MG/1
2 TABLET ORAL DAILY
COMMUNITY
Start: 2021-01-01

## 2021-01-01 RX ORDER — IBUPROFEN 200 MG
16 TABLET ORAL
Status: DISCONTINUED | OUTPATIENT
Start: 2021-01-01 | End: 2021-01-01 | Stop reason: HOSPADM

## 2021-01-01 RX ORDER — LOSARTAN POTASSIUM 50 MG/1
100 TABLET ORAL DAILY
Status: DISCONTINUED | OUTPATIENT
Start: 2021-01-01 | End: 2021-01-01 | Stop reason: HOSPADM

## 2021-01-01 RX ORDER — ACETAMINOPHEN 10 MG/ML
INJECTION, SOLUTION INTRAVENOUS
Status: DISCONTINUED | OUTPATIENT
Start: 2021-01-01 | End: 2021-01-01

## 2021-01-01 RX ORDER — ALPRAZOLAM 2 MG/1
2 TABLET ORAL DAILY PRN
Status: ON HOLD | COMMUNITY
Start: 2021-01-01 | End: 2021-01-01 | Stop reason: HOSPADM

## 2021-01-01 RX ORDER — CHLORTHALIDONE 25 MG/1
25 TABLET ORAL DAILY
Status: DISCONTINUED | OUTPATIENT
Start: 2021-01-01 | End: 2021-01-01

## 2021-01-01 RX ORDER — CLONIDINE HYDROCHLORIDE 0.1 MG/1
0.3 TABLET ORAL EVERY 8 HOURS
Status: DISCONTINUED | OUTPATIENT
Start: 2021-01-01 | End: 2021-01-01

## 2021-01-01 RX ORDER — CLONIDINE HYDROCHLORIDE 0.1 MG/1
0.3 TABLET ORAL
Status: COMPLETED | OUTPATIENT
Start: 2021-01-01 | End: 2021-01-01

## 2021-01-01 RX ORDER — CARVEDILOL 6.25 MG/1
6.25 TABLET ORAL 2 TIMES DAILY
Status: DISCONTINUED | OUTPATIENT
Start: 2021-01-01 | End: 2021-01-01 | Stop reason: HOSPADM

## 2021-01-01 RX ORDER — AMOXICILLIN 250 MG
2 CAPSULE ORAL 2 TIMES DAILY
Status: DISCONTINUED | OUTPATIENT
Start: 2021-01-01 | End: 2021-01-01 | Stop reason: HOSPADM

## 2021-01-01 RX ORDER — NIFEDIPINE 30 MG/1
60 TABLET, EXTENDED RELEASE ORAL DAILY
Status: DISCONTINUED | OUTPATIENT
Start: 2021-01-01 | End: 2021-01-01

## 2021-01-01 RX ORDER — CARVEDILOL 6.25 MG/1
6.25 TABLET ORAL 2 TIMES DAILY
Qty: 60 TABLET | Refills: 11 | Status: SHIPPED | OUTPATIENT
Start: 2021-01-01 | End: 2022-12-05

## 2021-01-01 RX ORDER — FENTANYL CITRATE 50 UG/ML
INJECTION, SOLUTION INTRAMUSCULAR; INTRAVENOUS
Status: DISCONTINUED | OUTPATIENT
Start: 2021-01-01 | End: 2021-01-01

## 2021-01-01 RX ORDER — PROPOFOL 10 MG/ML
VIAL (ML) INTRAVENOUS
Status: DISCONTINUED | OUTPATIENT
Start: 2021-01-01 | End: 2021-01-01

## 2021-01-01 RX ORDER — FENTANYL CITRATE 50 UG/ML
25 INJECTION, SOLUTION INTRAMUSCULAR; INTRAVENOUS EVERY 5 MIN PRN
Status: COMPLETED | OUTPATIENT
Start: 2021-01-01 | End: 2021-01-01

## 2021-01-01 RX ORDER — MIDAZOLAM HYDROCHLORIDE 1 MG/ML
INJECTION, SOLUTION INTRAMUSCULAR; INTRAVENOUS
Status: DISCONTINUED | OUTPATIENT
Start: 2021-01-01 | End: 2021-01-01

## 2021-01-01 RX ORDER — SODIUM,POTASSIUM PHOSPHATES 280-250MG
2 POWDER IN PACKET (EA) ORAL EVERY 4 HOURS
Status: COMPLETED | OUTPATIENT
Start: 2021-01-01 | End: 2021-01-01

## 2021-01-01 RX ORDER — NIFEDIPINE 60 MG/1
120 TABLET, EXTENDED RELEASE ORAL DAILY
Qty: 60 TABLET | Refills: 11 | Status: SHIPPED | OUTPATIENT
Start: 2021-01-01 | End: 2022-12-05

## 2021-01-01 RX ORDER — ATORVASTATIN CALCIUM 40 MG/1
40 TABLET, FILM COATED ORAL DAILY
Qty: 90 TABLET | Refills: 3 | Status: SHIPPED | OUTPATIENT
Start: 2021-01-01 | End: 2022-12-05

## 2021-01-01 RX ORDER — NICOTINE 7MG/24HR
1 PATCH, TRANSDERMAL 24 HOURS TRANSDERMAL DAILY
Status: DISCONTINUED | OUTPATIENT
Start: 2021-01-01 | End: 2021-01-01

## 2021-01-01 RX ORDER — LIDOCAINE HYDROCHLORIDE 10 MG/ML
INJECTION, SOLUTION EPIDURAL; INFILTRATION; INTRACAUDAL; PERINEURAL
Status: DISCONTINUED | OUTPATIENT
Start: 2021-01-01 | End: 2021-01-01 | Stop reason: HOSPADM

## 2021-01-01 RX ORDER — DOCUSATE SODIUM 100 MG/1
100 CAPSULE, LIQUID FILLED ORAL 2 TIMES DAILY
Qty: 30 CAPSULE | Refills: 0 | Status: SHIPPED | OUTPATIENT
Start: 2021-01-01 | End: 2021-01-01

## 2021-01-01 RX ORDER — LOPERAMIDE HYDROCHLORIDE 2 MG/1
2 CAPSULE ORAL 4 TIMES DAILY PRN
Status: DISCONTINUED | OUTPATIENT
Start: 2021-01-01 | End: 2021-01-01 | Stop reason: HOSPADM

## 2021-01-01 RX ORDER — FENTANYL CITRATE 50 UG/ML
25 INJECTION, SOLUTION INTRAMUSCULAR; INTRAVENOUS EVERY 5 MIN PRN
Status: DISCONTINUED | OUTPATIENT
Start: 2021-01-01 | End: 2021-01-01

## 2021-01-01 RX ORDER — BUPIVACAINE HYDROCHLORIDE 5 MG/ML
INJECTION, SOLUTION EPIDURAL; INTRACAUDAL
Status: COMPLETED
Start: 2021-01-01 | End: 2021-01-01

## 2021-01-01 RX ORDER — PROMETHAZINE HYDROCHLORIDE 12.5 MG/1
12.5 TABLET ORAL EVERY 6 HOURS PRN
Status: DISCONTINUED | OUTPATIENT
Start: 2021-01-01 | End: 2021-01-01 | Stop reason: HOSPADM

## 2021-01-01 RX ORDER — LABETALOL 100 MG/1
100 TABLET, FILM COATED ORAL 2 TIMES DAILY
Status: DISCONTINUED | OUTPATIENT
Start: 2021-01-01 | End: 2021-01-01

## 2021-01-01 RX ORDER — SODIUM CHLORIDE 0.9 % (FLUSH) 0.9 %
10 SYRINGE (ML) INJECTION EVERY 12 HOURS PRN
Status: DISCONTINUED | OUTPATIENT
Start: 2021-01-01 | End: 2021-01-01 | Stop reason: HOSPADM

## 2021-01-01 RX ORDER — HYDRALAZINE HYDROCHLORIDE 20 MG/ML
INJECTION INTRAMUSCULAR; INTRAVENOUS
Status: DISCONTINUED | OUTPATIENT
Start: 2021-01-01 | End: 2021-01-01

## 2021-01-01 RX ORDER — BUPIVACAINE HYDROCHLORIDE 2.5 MG/ML
INJECTION, SOLUTION EPIDURAL; INFILTRATION; INTRACAUDAL
Status: DISPENSED
Start: 2021-01-01 | End: 2021-01-01

## 2021-01-01 RX ORDER — POLYETHYLENE GLYCOL 3350 17 G/17G
17 POWDER, FOR SOLUTION ORAL DAILY
Qty: 1 BOTTLE | Refills: 0 | Status: SHIPPED | OUTPATIENT
Start: 2021-01-01

## 2021-01-01 RX ORDER — CLONIDINE HYDROCHLORIDE 0.1 MG/1
0.3 TABLET ORAL 3 TIMES DAILY
Status: DISCONTINUED | OUTPATIENT
Start: 2021-01-01 | End: 2021-01-01

## 2021-01-01 RX ORDER — ENEMA 19; 7 G/133ML; G/133ML
1 ENEMA RECTAL ONCE
Qty: 1 ENEMA | Refills: 1 | Status: SHIPPED | OUTPATIENT
Start: 2021-01-01 | End: 2021-01-01

## 2021-01-01 RX ORDER — METHADONE HYDROCHLORIDE 10 MG/1
30 TABLET ORAL DAILY
Status: DISCONTINUED | OUTPATIENT
Start: 2021-01-01 | End: 2021-01-01

## 2021-01-01 RX ORDER — SODIUM CHLORIDE 0.9 % (FLUSH) 0.9 %
10 SYRINGE (ML) INJECTION
Status: DISCONTINUED | OUTPATIENT
Start: 2021-01-01 | End: 2021-01-01 | Stop reason: HOSPADM

## 2021-01-01 RX ORDER — ONDANSETRON 2 MG/ML
4 INJECTION INTRAMUSCULAR; INTRAVENOUS EVERY 6 HOURS PRN
Status: DISCONTINUED | OUTPATIENT
Start: 2021-01-01 | End: 2021-01-01 | Stop reason: HOSPADM

## 2021-01-01 RX ORDER — DICYCLOMINE HYDROCHLORIDE 10 MG/1
10 CAPSULE ORAL EVERY 6 HOURS PRN
Status: DISCONTINUED | OUTPATIENT
Start: 2021-01-01 | End: 2021-01-01 | Stop reason: HOSPADM

## 2021-01-01 RX ORDER — FUROSEMIDE 10 MG/ML
20 INJECTION INTRAMUSCULAR; INTRAVENOUS ONCE
Status: COMPLETED | OUTPATIENT
Start: 2021-01-01 | End: 2021-01-01

## 2021-01-01 RX ORDER — LACTULOSE 10 G/15ML
20 SOLUTION ORAL DAILY
Qty: 60 ML | Refills: 0 | Status: SHIPPED | OUTPATIENT
Start: 2021-01-01 | End: 2021-01-01

## 2021-01-01 RX ORDER — DIPHENHYDRAMINE HYDROCHLORIDE 50 MG/ML
25 INJECTION INTRAMUSCULAR; INTRAVENOUS EVERY 6 HOURS PRN
Status: DISCONTINUED | OUTPATIENT
Start: 2021-01-01 | End: 2021-01-01 | Stop reason: HOSPADM

## 2021-01-01 RX ORDER — METHADONE HYDROCHLORIDE 10 MG/1
100 TABLET ORAL DAILY
Status: DISCONTINUED | OUTPATIENT
Start: 2021-01-01 | End: 2021-01-01

## 2021-01-01 RX ORDER — POLYETHYLENE GLYCOL 3350 17 G/17G
17 POWDER, FOR SOLUTION ORAL 2 TIMES DAILY
Status: DISCONTINUED | OUTPATIENT
Start: 2021-01-01 | End: 2021-01-01 | Stop reason: HOSPADM

## 2021-01-01 RX ORDER — IBUPROFEN 200 MG
TABLET ORAL
Status: COMPLETED
Start: 2021-01-01 | End: 2021-01-01

## 2021-01-01 RX ORDER — HYDROMORPHONE HYDROCHLORIDE 1 MG/ML
0.2 INJECTION, SOLUTION INTRAMUSCULAR; INTRAVENOUS; SUBCUTANEOUS EVERY 5 MIN PRN
Status: DISCONTINUED | OUTPATIENT
Start: 2021-01-01 | End: 2021-01-01 | Stop reason: HOSPADM

## 2021-01-01 RX ORDER — PANTOPRAZOLE SODIUM 40 MG/1
40 TABLET, DELAYED RELEASE ORAL DAILY
Status: DISCONTINUED | OUTPATIENT
Start: 2021-01-01 | End: 2021-01-01 | Stop reason: HOSPADM

## 2021-01-01 RX ORDER — PSEUDOEPHEDRINE/ACETAMINOPHEN 30MG-500MG
100 TABLET ORAL DAILY PRN
Status: DISCONTINUED | OUTPATIENT
Start: 2021-01-01 | End: 2021-01-01 | Stop reason: HOSPADM

## 2021-01-01 RX ORDER — GABAPENTIN 300 MG/1
300 CAPSULE ORAL 2 TIMES DAILY
Status: DISCONTINUED | OUTPATIENT
Start: 2021-01-01 | End: 2021-01-01

## 2021-01-01 RX ORDER — NALOXONE HCL 0.4 MG/ML
0.4 VIAL (ML) INJECTION
Status: DISCONTINUED | OUTPATIENT
Start: 2021-01-01 | End: 2021-01-01 | Stop reason: HOSPADM

## 2021-01-01 RX ORDER — SYRING-NEEDL,DISP,INSUL,0.3 ML 29 G X1/2"
296 SYRINGE, EMPTY DISPOSABLE MISCELLANEOUS
Status: ACTIVE | OUTPATIENT
Start: 2021-01-01 | End: 2021-01-01

## 2021-01-01 RX ORDER — LABETALOL 100 MG/1
100 TABLET, FILM COATED ORAL
Status: COMPLETED | OUTPATIENT
Start: 2021-01-01 | End: 2021-01-01

## 2021-01-01 RX ORDER — ROPIVACAINE HYDROCHLORIDE 2 MG/ML
7 INJECTION, SOLUTION EPIDURAL; INFILTRATION; PERINEURAL CONTINUOUS
Status: DISCONTINUED | OUTPATIENT
Start: 2021-01-01 | End: 2021-01-01

## 2021-01-01 RX ORDER — GADOBUTROL 604.72 MG/ML
10 INJECTION INTRAVENOUS
Status: COMPLETED | OUTPATIENT
Start: 2021-01-01 | End: 2021-01-01

## 2021-01-01 RX ORDER — HYDROCODONE BITARTRATE AND ACETAMINOPHEN 500; 5 MG/1; MG/1
TABLET ORAL
Status: DISCONTINUED | OUTPATIENT
Start: 2021-01-01 | End: 2021-01-01

## 2021-01-01 RX ORDER — HYDROXYZINE PAMOATE 50 MG/1
50 CAPSULE ORAL EVERY 6 HOURS PRN
Status: DISCONTINUED | OUTPATIENT
Start: 2021-01-01 | End: 2021-01-01 | Stop reason: HOSPADM

## 2021-01-01 RX ORDER — AMLODIPINE BESYLATE 10 MG/1
10 TABLET ORAL DAILY
Status: DISCONTINUED | OUTPATIENT
Start: 2021-01-01 | End: 2021-01-01

## 2021-01-01 RX ORDER — NIFEDIPINE 30 MG/1
90 TABLET, EXTENDED RELEASE ORAL DAILY
Status: DISCONTINUED | OUTPATIENT
Start: 2021-01-01 | End: 2021-01-01

## 2021-01-01 RX ORDER — IBUPROFEN 400 MG/1
400 TABLET ORAL EVERY 6 HOURS PRN
Status: DISCONTINUED | OUTPATIENT
Start: 2021-01-01 | End: 2021-01-01 | Stop reason: HOSPADM

## 2021-01-01 RX ORDER — METHADONE HYDROCHLORIDE 10 MG/1
30 TABLET ORAL
Status: COMPLETED | OUTPATIENT
Start: 2021-01-01 | End: 2021-01-01

## 2021-01-01 RX ORDER — CHLORTHALIDONE 50 MG/1
100 TABLET ORAL NIGHTLY
Qty: 60 TABLET | Refills: 11 | Status: SHIPPED | OUTPATIENT
Start: 2021-01-01 | End: 2022-12-05

## 2021-01-01 RX ORDER — IBUPROFEN 200 MG
24 TABLET ORAL
Status: DISCONTINUED | OUTPATIENT
Start: 2021-01-01 | End: 2021-01-01 | Stop reason: HOSPADM

## 2021-01-01 RX ORDER — MUPIROCIN 20 MG/G
OINTMENT TOPICAL 2 TIMES DAILY
Status: DISPENSED | OUTPATIENT
Start: 2021-01-01 | End: 2021-01-01

## 2021-01-01 RX ORDER — LIDOCAINE HCL/PF 100 MG/5ML
SYRINGE (ML) INTRAVENOUS
Status: DISCONTINUED | OUTPATIENT
Start: 2021-01-01 | End: 2021-01-01

## 2021-01-01 RX ORDER — AMOXICILLIN 250 MG
2 CAPSULE ORAL 2 TIMES DAILY
Qty: 14 TABLET | Refills: 0 | Status: SHIPPED | OUTPATIENT
Start: 2021-01-01

## 2021-01-01 RX ORDER — EPINEPHRINE 1 MG/ML
INJECTION, SOLUTION INTRACARDIAC; INTRAMUSCULAR; INTRAVENOUS; SUBCUTANEOUS
Status: DISPENSED
Start: 2021-01-01 | End: 2021-01-01

## 2021-01-01 RX ORDER — LABETALOL 100 MG/1
100 TABLET, FILM COATED ORAL 2 TIMES DAILY
Status: ON HOLD | COMMUNITY
End: 2021-01-01 | Stop reason: HOSPADM

## 2021-01-01 RX ORDER — POLYETHYLENE GLYCOL 3350 17 G/17G
17 POWDER, FOR SOLUTION ORAL DAILY
Status: DISCONTINUED | OUTPATIENT
Start: 2021-01-01 | End: 2021-01-01

## 2021-01-01 RX ORDER — AMOXICILLIN AND CLAVULANATE POTASSIUM 875; 125 MG/1; MG/1
1 TABLET, FILM COATED ORAL 2 TIMES DAILY
Qty: 14 TABLET | Refills: 0 | Status: ON HOLD | OUTPATIENT
Start: 2021-01-01 | End: 2021-01-01 | Stop reason: HOSPADM

## 2021-01-01 RX ORDER — HYDRALAZINE HYDROCHLORIDE 50 MG/1
100 TABLET, FILM COATED ORAL EVERY 8 HOURS
Status: DISCONTINUED | OUTPATIENT
Start: 2021-01-01 | End: 2021-01-01 | Stop reason: HOSPADM

## 2021-01-01 RX ORDER — GLYCERIN 1 G/1
1 SUPPOSITORY RECTAL ONCE
Status: DISCONTINUED | OUTPATIENT
Start: 2021-01-01 | End: 2021-01-01

## 2021-01-01 RX ORDER — METHADONE HYDROCHLORIDE 10 MG/1
60 TABLET ORAL ONCE
Status: COMPLETED | OUTPATIENT
Start: 2021-01-01 | End: 2021-01-01

## 2021-01-01 RX ORDER — BUPRENORPHINE HYDROCHLORIDE AND NALOXONE HYDROCHLORIDE DIHYDRATE 2; .5 MG/1; MG/1
2 TABLET SUBLINGUAL EVERY 6 HOURS PRN
Status: DISCONTINUED | OUTPATIENT
Start: 2021-01-01 | End: 2021-01-01

## 2021-01-01 RX ORDER — CLONIDINE HYDROCHLORIDE 0.1 MG/1
0.3 TABLET ORAL EVERY 6 HOURS
Status: DISCONTINUED | OUTPATIENT
Start: 2021-01-01 | End: 2021-01-01

## 2021-01-01 RX ORDER — POLYETHYLENE GLYCOL 3350 17 G/17G
17 POWDER, FOR SOLUTION ORAL 2 TIMES DAILY
Status: DISCONTINUED | OUTPATIENT
Start: 2021-01-01 | End: 2021-01-01

## 2021-01-01 RX ORDER — INSULIN ASPART 100 [IU]/ML
1-10 INJECTION, SOLUTION INTRAVENOUS; SUBCUTANEOUS
Status: DISCONTINUED | OUTPATIENT
Start: 2021-01-01 | End: 2021-01-01 | Stop reason: HOSPADM

## 2021-01-01 RX ORDER — CIPROFLOXACIN 500 MG/1
750 TABLET ORAL EVERY 12 HOURS
Qty: 36 TABLET | Refills: 0 | Status: CANCELLED | OUTPATIENT
Start: 2021-01-01 | End: 2021-01-01

## 2021-01-01 RX ORDER — ALUMINUM HYDROXIDE, MAGNESIUM HYDROXIDE, AND SIMETHICONE 2400; 240; 2400 MG/30ML; MG/30ML; MG/30ML
30 SUSPENSION ORAL EVERY 6 HOURS PRN
Status: DISCONTINUED | OUTPATIENT
Start: 2021-01-01 | End: 2021-01-01 | Stop reason: HOSPADM

## 2021-01-01 RX ORDER — CELECOXIB 200 MG/1
200 CAPSULE ORAL DAILY
Qty: 7 CAPSULE | Refills: 0 | Status: SHIPPED | OUTPATIENT
Start: 2021-01-01

## 2021-01-01 RX ORDER — PSEUDOEPHEDRINE/ACETAMINOPHEN 30MG-500MG
100 TABLET ORAL ONCE
Status: DISCONTINUED | OUTPATIENT
Start: 2021-01-01 | End: 2021-01-01

## 2021-01-01 RX ORDER — LABETALOL HYDROCHLORIDE 5 MG/ML
INJECTION, SOLUTION INTRAVENOUS
Status: DISCONTINUED | OUTPATIENT
Start: 2021-01-01 | End: 2021-01-01

## 2021-01-01 RX ORDER — KETOROLAC TROMETHAMINE 15 MG/ML
15 INJECTION, SOLUTION INTRAMUSCULAR; INTRAVENOUS ONCE
Status: COMPLETED | OUTPATIENT
Start: 2021-01-01 | End: 2021-01-01

## 2021-01-01 RX ORDER — SYRING-NEEDL,DISP,INSUL,0.3 ML 29 G X1/2"
296 SYRINGE, EMPTY DISPOSABLE MISCELLANEOUS
Status: DISCONTINUED | OUTPATIENT
Start: 2021-01-01 | End: 2021-01-01

## 2021-01-01 RX ORDER — CALCIUM CARBONATE 200(500)MG
500 TABLET,CHEWABLE ORAL 2 TIMES DAILY PRN
Status: DISCONTINUED | OUTPATIENT
Start: 2021-01-01 | End: 2021-01-01 | Stop reason: HOSPADM

## 2021-01-01 RX ORDER — HYDROCODONE BITARTRATE AND ACETAMINOPHEN 500; 5 MG/1; MG/1
TABLET ORAL
Status: DISCONTINUED | OUTPATIENT
Start: 2021-01-01 | End: 2021-01-01 | Stop reason: HOSPADM

## 2021-01-01 RX ORDER — NIFEDIPINE 30 MG/1
120 TABLET, EXTENDED RELEASE ORAL DAILY
Status: DISCONTINUED | OUTPATIENT
Start: 2021-01-01 | End: 2021-01-01 | Stop reason: HOSPADM

## 2021-01-01 RX ORDER — FUROSEMIDE 40 MG/1
40 TABLET ORAL DAILY
Qty: 5 TABLET | Refills: 0 | Status: SHIPPED | OUTPATIENT
Start: 2021-01-01 | End: 2022-11-03

## 2021-01-01 RX ORDER — BUPIVACAINE HYDROCHLORIDE 5 MG/ML
INJECTION, SOLUTION EPIDURAL; INTRACAUDAL
Status: COMPLETED | OUTPATIENT
Start: 2021-01-01 | End: 2021-01-01

## 2021-01-01 RX ORDER — FUROSEMIDE 10 MG/ML
40 INJECTION INTRAMUSCULAR; INTRAVENOUS
Status: COMPLETED | OUTPATIENT
Start: 2021-01-01 | End: 2021-01-01

## 2021-01-01 RX ORDER — METHADONE HYDROCHLORIDE 10 MG/1
40 TABLET ORAL DAILY
Status: DISCONTINUED | OUTPATIENT
Start: 2021-01-01 | End: 2021-01-01

## 2021-01-01 RX ORDER — GABAPENTIN 400 MG/1
400 CAPSULE ORAL 2 TIMES DAILY
Status: DISCONTINUED | OUTPATIENT
Start: 2021-01-01 | End: 2021-01-01 | Stop reason: HOSPADM

## 2021-01-01 RX ORDER — MAGNESIUM SULFATE HEPTAHYDRATE 40 MG/ML
2 INJECTION, SOLUTION INTRAVENOUS ONCE
Status: COMPLETED | OUTPATIENT
Start: 2021-01-01 | End: 2021-01-01

## 2021-01-01 RX ORDER — CLINDAMYCIN HYDROCHLORIDE 150 MG/1
300 CAPSULE ORAL EVERY 6 HOURS
Status: DISCONTINUED | OUTPATIENT
Start: 2021-01-01 | End: 2021-01-01

## 2021-01-01 RX ORDER — ATORVASTATIN CALCIUM 20 MG/1
40 TABLET, FILM COATED ORAL DAILY
Status: DISCONTINUED | OUTPATIENT
Start: 2021-01-01 | End: 2021-01-01 | Stop reason: HOSPADM

## 2021-01-01 RX ORDER — CHLORTHALIDONE 25 MG/1
100 TABLET ORAL NIGHTLY
Status: DISCONTINUED | OUTPATIENT
Start: 2021-01-01 | End: 2021-01-01 | Stop reason: HOSPADM

## 2021-01-01 RX ORDER — ONDANSETRON 2 MG/ML
INJECTION INTRAMUSCULAR; INTRAVENOUS
Status: DISCONTINUED | OUTPATIENT
Start: 2021-01-01 | End: 2021-01-01

## 2021-01-01 RX ORDER — METHOCARBAMOL 750 MG/1
750 TABLET, FILM COATED ORAL EVERY 6 HOURS
Status: DISCONTINUED | OUTPATIENT
Start: 2021-01-01 | End: 2021-01-01 | Stop reason: HOSPADM

## 2021-01-01 RX ORDER — ROCURONIUM BROMIDE 10 MG/ML
INJECTION, SOLUTION INTRAVENOUS
Status: DISCONTINUED | OUTPATIENT
Start: 2021-01-01 | End: 2021-01-01

## 2021-01-01 RX ORDER — IBUPROFEN 200 MG
1 TABLET ORAL DAILY
Status: DISCONTINUED | OUTPATIENT
Start: 2021-01-01 | End: 2021-01-01 | Stop reason: HOSPADM

## 2021-01-01 RX ORDER — BUPIVACAINE HYDROCHLORIDE 5 MG/ML
INJECTION, SOLUTION EPIDURAL; INTRACAUDAL
Status: DISCONTINUED | OUTPATIENT
Start: 2021-01-01 | End: 2021-01-01 | Stop reason: HOSPADM

## 2021-01-01 RX ORDER — LINACLOTIDE 145 UG/1
145 CAPSULE, GELATIN COATED ORAL DAILY
Status: ON HOLD | COMMUNITY
Start: 2021-01-01 | End: 2021-01-01 | Stop reason: HOSPADM

## 2021-01-01 RX ORDER — PSEUDOEPHEDRINE/ACETAMINOPHEN 30MG-500MG
100 TABLET ORAL
Status: DISCONTINUED | OUTPATIENT
Start: 2021-01-01 | End: 2021-01-01

## 2021-01-01 RX ORDER — CHLORTHALIDONE 25 MG/1
50 TABLET ORAL DAILY
Status: DISCONTINUED | OUTPATIENT
Start: 2021-01-01 | End: 2021-01-01

## 2021-01-01 RX ORDER — ACETAMINOPHEN 500 MG
1000 TABLET ORAL EVERY 8 HOURS
Status: DISCONTINUED | OUTPATIENT
Start: 2021-01-01 | End: 2021-01-01 | Stop reason: HOSPADM

## 2021-01-01 RX ORDER — HYDRALAZINE HYDROCHLORIDE 20 MG/ML
10 INJECTION INTRAMUSCULAR; INTRAVENOUS EVERY 6 HOURS PRN
Status: DISCONTINUED | OUTPATIENT
Start: 2021-01-01 | End: 2021-01-01 | Stop reason: HOSPADM

## 2021-01-01 RX ORDER — IBUPROFEN 800 MG/1
800 TABLET ORAL 2 TIMES DAILY PRN
COMMUNITY
Start: 2021-01-01

## 2021-01-01 RX ORDER — FAMOTIDINE 10 MG/ML
INJECTION INTRAVENOUS
Status: DISCONTINUED | OUTPATIENT
Start: 2021-01-01 | End: 2021-01-01

## 2021-01-01 RX ORDER — ENOXAPARIN SODIUM 100 MG/ML
40 INJECTION SUBCUTANEOUS EVERY 24 HOURS
Status: DISCONTINUED | OUTPATIENT
Start: 2021-01-01 | End: 2021-01-01 | Stop reason: HOSPADM

## 2021-01-01 RX ORDER — METHOCARBAMOL 500 MG/1
500 TABLET, FILM COATED ORAL EVERY 8 HOURS PRN
Status: DISCONTINUED | OUTPATIENT
Start: 2021-01-01 | End: 2021-01-01

## 2021-01-01 RX ORDER — CELECOXIB 200 MG/1
200 CAPSULE ORAL DAILY
Status: DISCONTINUED | OUTPATIENT
Start: 2021-01-01 | End: 2021-01-01 | Stop reason: HOSPADM

## 2021-01-01 RX ORDER — SUCCINYLCHOLINE CHLORIDE 20 MG/ML
INJECTION INTRAMUSCULAR; INTRAVENOUS
Status: DISCONTINUED | OUTPATIENT
Start: 2021-01-01 | End: 2021-01-01

## 2021-01-01 RX ORDER — CLONIDINE HYDROCHLORIDE 0.3 MG/1
0.3 TABLET ORAL
Status: COMPLETED | OUTPATIENT
Start: 2021-01-01 | End: 2021-01-01

## 2021-01-01 RX ORDER — CLONIDINE HYDROCHLORIDE 0.1 MG/1
0.2 TABLET ORAL EVERY 8 HOURS
Status: DISCONTINUED | OUTPATIENT
Start: 2021-01-01 | End: 2021-01-01 | Stop reason: HOSPADM

## 2021-01-01 RX ORDER — CHLORTHALIDONE 25 MG/1
50 TABLET ORAL NIGHTLY
Status: DISCONTINUED | OUTPATIENT
Start: 2021-01-01 | End: 2021-01-01

## 2021-01-01 RX ORDER — METHADONE HYDROCHLORIDE 10 MG/1
20 TABLET ORAL DAILY
Status: DISCONTINUED | OUTPATIENT
Start: 2021-01-01 | End: 2021-01-01 | Stop reason: HOSPADM

## 2021-01-01 RX ORDER — NALOXONE HCL 0.4 MG/ML
0.02 VIAL (ML) INJECTION
Status: DISCONTINUED | OUTPATIENT
Start: 2021-01-01 | End: 2021-01-01

## 2021-01-01 RX ORDER — NIFEDIPINE 30 MG/1
30 TABLET, EXTENDED RELEASE ORAL DAILY
Status: DISCONTINUED | OUTPATIENT
Start: 2021-01-01 | End: 2021-01-01

## 2021-01-01 RX ORDER — TRAMADOL HYDROCHLORIDE 50 MG/1
50 TABLET ORAL EVERY 6 HOURS PRN
Status: DISCONTINUED | OUTPATIENT
Start: 2021-01-01 | End: 2021-01-01 | Stop reason: HOSPADM

## 2021-01-01 RX ORDER — POTASSIUM CHLORIDE 20 MEQ/1
20 TABLET, EXTENDED RELEASE ORAL DAILY
Qty: 5 TABLET | Refills: 0 | Status: SHIPPED | OUTPATIENT
Start: 2021-01-01 | End: 2021-01-01

## 2021-01-01 RX ORDER — HYDROMORPHONE HYDROCHLORIDE 1 MG/ML
0.2 INJECTION, SOLUTION INTRAMUSCULAR; INTRAVENOUS; SUBCUTANEOUS EVERY 5 MIN PRN
Status: DISCONTINUED | OUTPATIENT
Start: 2021-01-01 | End: 2021-01-01

## 2021-01-01 RX ORDER — HYDROMORPHONE HYDROCHLORIDE 2 MG/ML
INJECTION, SOLUTION INTRAMUSCULAR; INTRAVENOUS; SUBCUTANEOUS
Status: DISCONTINUED | OUTPATIENT
Start: 2021-01-01 | End: 2021-01-01

## 2021-01-01 RX ADMIN — PIPERACILLIN SODIUM AND TAZOBACTAM SODIUM 4.5 G: 4; .5 INJECTION, POWDER, FOR SOLUTION INTRAVENOUS at 01:11

## 2021-01-01 RX ADMIN — SODIUM CHLORIDE: 0.9 INJECTION, SOLUTION INTRAVENOUS at 03:12

## 2021-01-01 RX ADMIN — CLONIDINE HYDROCHLORIDE 0.3 MG: 0.1 TABLET ORAL at 09:12

## 2021-01-01 RX ADMIN — METHOCARBAMOL 750 MG: 750 TABLET ORAL at 12:12

## 2021-01-01 RX ADMIN — AMLODIPINE BESYLATE 10 MG: 10 TABLET ORAL at 08:12

## 2021-01-01 RX ADMIN — HYDRALAZINE HYDROCHLORIDE 100 MG: 50 TABLET ORAL at 09:12

## 2021-01-01 RX ADMIN — POTASSIUM & SODIUM PHOSPHATES POWDER PACK 280-160-250 MG 2 PACKET: 280-160-250 PACK at 02:12

## 2021-01-01 RX ADMIN — SENNOSIDES AND DOCUSATE SODIUM 2 TABLET: 50; 8.6 TABLET ORAL at 09:12

## 2021-01-01 RX ADMIN — CLINDAMYCIN HYDROCHLORIDE 300 MG: 150 CAPSULE ORAL at 11:12

## 2021-01-01 RX ADMIN — ACETAMINOPHEN 1000 MG: 500 TABLET ORAL at 02:12

## 2021-01-01 RX ADMIN — FENTANYL CITRATE 50 MCG: 50 INJECTION, SOLUTION INTRAMUSCULAR; INTRAVENOUS at 09:11

## 2021-01-01 RX ADMIN — HYDRALAZINE HYDROCHLORIDE 10 MG: 20 INJECTION INTRAMUSCULAR; INTRAVENOUS at 02:12

## 2021-01-01 RX ADMIN — HYDRALAZINE HYDROCHLORIDE 5 MG: 20 INJECTION INTRAMUSCULAR; INTRAVENOUS at 10:11

## 2021-01-01 RX ADMIN — GADOBUTROL 10 ML: 604.72 INJECTION INTRAVENOUS at 10:11

## 2021-01-01 RX ADMIN — MIDAZOLAM HYDROCHLORIDE 1 MG: 1 INJECTION, SOLUTION INTRAMUSCULAR; INTRAVENOUS at 03:12

## 2021-01-01 RX ADMIN — HYDRALAZINE HYDROCHLORIDE 100 MG: 50 TABLET ORAL at 05:12

## 2021-01-01 RX ADMIN — AMLODIPINE BESYLATE 10 MG: 10 TABLET ORAL at 11:12

## 2021-01-01 RX ADMIN — INSULIN ASPART 1 UNITS: 100 INJECTION, SOLUTION INTRAVENOUS; SUBCUTANEOUS at 09:12

## 2021-01-01 RX ADMIN — FENTANYL CITRATE 25 MCG: 50 INJECTION INTRAMUSCULAR; INTRAVENOUS at 06:12

## 2021-01-01 RX ADMIN — LABETALOL HYDROCHLORIDE 100 MG: 100 TABLET, FILM COATED ORAL at 09:12

## 2021-01-01 RX ADMIN — POLYETHYLENE GLYCOL 3350 17 G: 17 POWDER, FOR SOLUTION ORAL at 09:11

## 2021-01-01 RX ADMIN — HYDRALAZINE HYDROCHLORIDE 100 MG: 25 TABLET, FILM COATED ORAL at 12:09

## 2021-01-01 RX ADMIN — CLINDAMYCIN HYDROCHLORIDE 300 MG: 150 CAPSULE ORAL at 05:11

## 2021-01-01 RX ADMIN — BUPIVACAINE HYDROCHLORIDE 20 ML: 5 INJECTION, SOLUTION EPIDURAL; INTRACAUDAL; PERINEURAL at 03:12

## 2021-01-01 RX ADMIN — ATORVASTATIN CALCIUM 40 MG: 20 TABLET, FILM COATED ORAL at 09:12

## 2021-01-01 RX ADMIN — HYDROMORPHONE HYDROCHLORIDE 0.2 MG: 1 INJECTION, SOLUTION INTRAMUSCULAR; INTRAVENOUS; SUBCUTANEOUS at 07:12

## 2021-01-01 RX ADMIN — METHOCARBAMOL 750 MG: 750 TABLET ORAL at 09:12

## 2021-01-01 RX ADMIN — ACETAMINOPHEN 1000 MG: 500 TABLET ORAL at 05:12

## 2021-01-01 RX ADMIN — VANCOMYCIN HYDROCHLORIDE 2500 MG: 1 INJECTION, POWDER, LYOPHILIZED, FOR SOLUTION INTRAVENOUS at 11:09

## 2021-01-01 RX ADMIN — HYDROMORPHONE HYDROCHLORIDE 0.2 MG: 1 INJECTION, SOLUTION INTRAMUSCULAR; INTRAVENOUS; SUBCUTANEOUS at 11:11

## 2021-01-01 RX ADMIN — HYDRALAZINE HYDROCHLORIDE 5 MG: 20 INJECTION INTRAMUSCULAR; INTRAVENOUS at 09:11

## 2021-01-01 RX ADMIN — SENNOSIDES AND DOCUSATE SODIUM 2 TABLET: 50; 8.6 TABLET ORAL at 08:12

## 2021-01-01 RX ADMIN — POTASSIUM & SODIUM PHOSPHATES POWDER PACK 280-160-250 MG 2 PACKET: 280-160-250 PACK at 09:12

## 2021-01-01 RX ADMIN — HYDRALAZINE HYDROCHLORIDE 100 MG: 50 TABLET ORAL at 10:11

## 2021-01-01 RX ADMIN — MUPIROCIN: 20 OINTMENT TOPICAL at 10:11

## 2021-01-01 RX ADMIN — SUCCINYLCHOLINE CHLORIDE 160 MG: 20 INJECTION, SOLUTION INTRAMUSCULAR; INTRAVENOUS at 03:12

## 2021-01-01 RX ADMIN — CLINDAMYCIN HYDROCHLORIDE 300 MG: 150 CAPSULE ORAL at 12:12

## 2021-01-01 RX ADMIN — MIDAZOLAM HYDROCHLORIDE 2 MG: 1 INJECTION, SOLUTION INTRAMUSCULAR; INTRAVENOUS at 09:11

## 2021-01-01 RX ADMIN — LOSARTAN POTASSIUM 100 MG: 50 TABLET, FILM COATED ORAL at 09:12

## 2021-01-01 RX ADMIN — ACETAMINOPHEN 1000 MG: 10 INJECTION, SOLUTION INTRAVENOUS at 09:11

## 2021-01-01 RX ADMIN — PIPERACILLIN SODIUM AND TAZOBACTAM SODIUM 4.5 G: 4; .5 INJECTION, POWDER, FOR SOLUTION INTRAVENOUS at 09:11

## 2021-01-01 RX ADMIN — LOSARTAN POTASSIUM 100 MG: 50 TABLET, FILM COATED ORAL at 08:12

## 2021-01-01 RX ADMIN — CLONIDINE HYDROCHLORIDE 0.3 MG: 0.1 TABLET ORAL at 05:11

## 2021-01-01 RX ADMIN — FUROSEMIDE 20 MG: 10 INJECTION, SOLUTION INTRAVENOUS at 03:12

## 2021-01-01 RX ADMIN — PIPERACILLIN SODIUM AND TAZOBACTAM SODIUM 4.5 G: 4; .5 INJECTION, POWDER, FOR SOLUTION INTRAVENOUS at 05:11

## 2021-01-01 RX ADMIN — ROCURONIUM BROMIDE 10 MG: 10 INJECTION, SOLUTION INTRAVENOUS at 03:12

## 2021-01-01 RX ADMIN — HYDRALAZINE HYDROCHLORIDE 100 MG: 25 TABLET, FILM COATED ORAL at 10:08

## 2021-01-01 RX ADMIN — SENNOSIDES AND DOCUSATE SODIUM 2 TABLET: 50; 8.6 TABLET ORAL at 09:11

## 2021-01-01 RX ADMIN — VANCOMYCIN HYDROCHLORIDE 1500 MG: 1.5 INJECTION, POWDER, LYOPHILIZED, FOR SOLUTION INTRAVENOUS at 10:11

## 2021-01-01 RX ADMIN — PROMETHAZINE HYDROCHLORIDE 12.5 MG: 12.5 TABLET ORAL at 11:11

## 2021-01-01 RX ADMIN — ACETAMINOPHEN 1000 MG: 500 TABLET ORAL at 09:12

## 2021-01-01 RX ADMIN — METHADONE HYDROCHLORIDE 30 MG: 10 TABLET ORAL at 11:12

## 2021-01-01 RX ADMIN — MUPIROCIN: 20 OINTMENT TOPICAL at 08:12

## 2021-01-01 RX ADMIN — INSULIN ASPART 1 UNITS: 100 INJECTION, SOLUTION INTRAVENOUS; SUBCUTANEOUS at 01:12

## 2021-01-01 RX ADMIN — CLINDAMYCIN HYDROCHLORIDE 300 MG: 150 CAPSULE ORAL at 11:11

## 2021-01-01 RX ADMIN — METHOCARBAMOL 750 MG: 750 TABLET ORAL at 03:12

## 2021-01-01 RX ADMIN — LABETALOL HYDROCHLORIDE 100 MG: 100 TABLET, FILM COATED ORAL at 08:11

## 2021-01-01 RX ADMIN — LABETALOL HYDROCHLORIDE 100 MG: 100 TABLET, FILM COATED ORAL at 01:11

## 2021-01-01 RX ADMIN — CELECOXIB 200 MG: 200 CAPSULE ORAL at 09:12

## 2021-01-01 RX ADMIN — MUPIROCIN: 20 OINTMENT TOPICAL at 09:11

## 2021-01-01 RX ADMIN — HYDRALAZINE HYDROCHLORIDE 10 MG: 20 INJECTION INTRAMUSCULAR; INTRAVENOUS at 06:12

## 2021-01-01 RX ADMIN — HYDRALAZINE HYDROCHLORIDE 100 MG: 50 TABLET ORAL at 02:11

## 2021-01-01 RX ADMIN — ONDANSETRON 4 MG: 2 INJECTION INTRAMUSCULAR; INTRAVENOUS at 09:11

## 2021-01-01 RX ADMIN — PIPERACILLIN SODIUM AND TAZOBACTAM SODIUM 4.5 G: 4; .5 INJECTION, POWDER, FOR SOLUTION INTRAVENOUS at 04:12

## 2021-01-01 RX ADMIN — SENNOSIDES AND DOCUSATE SODIUM 2 TABLET: 50; 8.6 TABLET ORAL at 12:11

## 2021-01-01 RX ADMIN — INSULIN ASPART 4 UNITS: 100 INJECTION, SOLUTION INTRAVENOUS; SUBCUTANEOUS at 09:12

## 2021-01-01 RX ADMIN — INSULIN ASPART 4 UNITS: 100 INJECTION, SOLUTION INTRAVENOUS; SUBCUTANEOUS at 12:11

## 2021-01-01 RX ADMIN — Medication 296 ML: at 10:11

## 2021-01-01 RX ADMIN — CLINDAMYCIN HYDROCHLORIDE 300 MG: 150 CAPSULE ORAL at 06:11

## 2021-01-01 RX ADMIN — PANTOPRAZOLE SODIUM 40 MG: 40 TABLET, DELAYED RELEASE ORAL at 09:12

## 2021-01-01 RX ADMIN — LABETALOL HYDROCHLORIDE 100 MG: 100 TABLET, FILM COATED ORAL at 08:12

## 2021-01-01 RX ADMIN — VANCOMYCIN HYDROCHLORIDE 1500 MG: 1.5 INJECTION, POWDER, LYOPHILIZED, FOR SOLUTION INTRAVENOUS at 08:11

## 2021-01-01 RX ADMIN — CLONIDINE HYDROCHLORIDE 0.2 MG: 0.1 TABLET ORAL at 02:12

## 2021-01-01 RX ADMIN — POLYETHYLENE GLYCOL 3350 17 G: 17 POWDER, FOR SOLUTION ORAL at 09:12

## 2021-01-01 RX ADMIN — FENTANYL CITRATE 50 MCG: 50 INJECTION, SOLUTION INTRAMUSCULAR; INTRAVENOUS at 03:12

## 2021-01-01 RX ADMIN — MUPIROCIN: 20 OINTMENT TOPICAL at 11:11

## 2021-01-01 RX ADMIN — CHLORTHALIDONE 50 MG: 25 TABLET ORAL at 09:12

## 2021-01-01 RX ADMIN — INSULIN ASPART 2 UNITS: 100 INJECTION, SOLUTION INTRAVENOUS; SUBCUTANEOUS at 12:12

## 2021-01-01 RX ADMIN — CLONIDINE HYDROCHLORIDE 0.3 MG: 0.1 TABLET ORAL at 01:11

## 2021-01-01 RX ADMIN — TRAMADOL HYDROCHLORIDE 50 MG: 50 TABLET ORAL at 09:12

## 2021-01-01 RX ADMIN — KETOROLAC TROMETHAMINE 15 MG: 15 INJECTION, SOLUTION INTRAMUSCULAR; INTRAVENOUS at 06:12

## 2021-01-01 RX ADMIN — CLONIDINE HYDROCHLORIDE 0.2 MG: 0.1 TABLET ORAL at 09:12

## 2021-01-01 RX ADMIN — HYDRALAZINE HYDROCHLORIDE 100 MG: 50 TABLET ORAL at 03:11

## 2021-01-01 RX ADMIN — INSULIN ASPART 2 UNITS: 100 INJECTION, SOLUTION INTRAVENOUS; SUBCUTANEOUS at 04:12

## 2021-01-01 RX ADMIN — VANCOMYCIN HYDROCHLORIDE 1500 MG: 1.5 INJECTION, POWDER, LYOPHILIZED, FOR SOLUTION INTRAVENOUS at 09:11

## 2021-01-01 RX ADMIN — LABETALOL HYDROCHLORIDE 100 MG: 100 TABLET, FILM COATED ORAL at 06:11

## 2021-01-01 RX ADMIN — INSULIN ASPART 2 UNITS: 100 INJECTION, SOLUTION INTRAVENOUS; SUBCUTANEOUS at 05:12

## 2021-01-01 RX ADMIN — HYDRALAZINE HYDROCHLORIDE 100 MG: 50 TABLET ORAL at 06:12

## 2021-01-01 RX ADMIN — FENTANYL CITRATE 25 MCG: 50 INJECTION, SOLUTION INTRAMUSCULAR; INTRAVENOUS at 05:12

## 2021-01-01 RX ADMIN — ATORVASTATIN CALCIUM 40 MG: 20 TABLET, FILM COATED ORAL at 01:11

## 2021-01-01 RX ADMIN — METHOCARBAMOL 750 MG: 750 TABLET ORAL at 08:12

## 2021-01-01 RX ADMIN — TRAMADOL HYDROCHLORIDE 50 MG: 50 TABLET ORAL at 05:12

## 2021-01-01 RX ADMIN — HYDROMORPHONE HYDROCHLORIDE 0.4 MG: 2 INJECTION INTRAMUSCULAR; INTRAVENOUS; SUBCUTANEOUS at 10:11

## 2021-01-01 RX ADMIN — ENOXAPARIN SODIUM 40 MG: 40 INJECTION SUBCUTANEOUS at 05:12

## 2021-01-01 RX ADMIN — MAGNESIUM SULFATE 2 G: 2 INJECTION INTRAVENOUS at 10:12

## 2021-01-01 RX ADMIN — CLONIDINE HYDROCHLORIDE 0.3 MG: 0.3 TABLET ORAL at 11:08

## 2021-01-01 RX ADMIN — CALCIUM CARBONATE (ANTACID) CHEW TAB 500 MG 500 MG: 500 CHEW TAB at 09:11

## 2021-01-01 RX ADMIN — NICOTINE 1 PATCH: 14 PATCH, EXTENDED RELEASE TRANSDERMAL at 09:12

## 2021-01-01 RX ADMIN — SENNOSIDES AND DOCUSATE SODIUM 2 TABLET: 50; 8.6 TABLET ORAL at 01:11

## 2021-01-01 RX ADMIN — PANTOPRAZOLE SODIUM 40 MG: 40 TABLET, DELAYED RELEASE ORAL at 08:11

## 2021-01-01 RX ADMIN — METHOCARBAMOL 750 MG: 750 TABLET ORAL at 01:12

## 2021-01-01 RX ADMIN — ATORVASTATIN CALCIUM 40 MG: 20 TABLET, FILM COATED ORAL at 08:12

## 2021-01-01 RX ADMIN — HYDRALAZINE HYDROCHLORIDE 100 MG: 50 TABLET ORAL at 05:11

## 2021-01-01 RX ADMIN — CLONIDINE HYDROCHLORIDE 0.3 MG: 0.1 TABLET ORAL at 06:11

## 2021-01-01 RX ADMIN — MAGNESIUM CITRATE 296 ML: 1.75 LIQUID ORAL at 08:10

## 2021-01-01 RX ADMIN — PIPERACILLIN AND TAZOBACTAM 4.5 G: 4; .5 INJECTION, POWDER, LYOPHILIZED, FOR SOLUTION INTRAVENOUS; PARENTERAL at 10:09

## 2021-01-01 RX ADMIN — NICOTINE: 14 PATCH, EXTENDED RELEASE TOPICAL at 10:11

## 2021-01-01 RX ADMIN — NICOTINE 1 PATCH: 14 PATCH, EXTENDED RELEASE TRANSDERMAL at 08:12

## 2021-01-01 RX ADMIN — PIPERACILLIN SODIUM AND TAZOBACTAM SODIUM 4.5 G: 4; .5 INJECTION, POWDER, FOR SOLUTION INTRAVENOUS at 09:12

## 2021-01-01 RX ADMIN — CLONIDINE HYDROCHLORIDE 0.3 MG: 0.1 TABLET ORAL at 08:11

## 2021-01-01 RX ADMIN — GABAPENTIN 400 MG: 400 CAPSULE ORAL at 09:12

## 2021-01-01 RX ADMIN — ONDANSETRON 4 MG: 2 INJECTION INTRAMUSCULAR; INTRAVENOUS at 08:11

## 2021-01-01 RX ADMIN — CHLORTHALIDONE 25 MG: 25 TABLET ORAL at 09:12

## 2021-01-01 RX ADMIN — ALUMINUM HYDROXIDE, MAGNESIUM HYDROXIDE, AND DIMETHICONE 30 ML: 400; 400; 40 SUSPENSION ORAL at 05:11

## 2021-01-01 RX ADMIN — HYDRALAZINE HYDROCHLORIDE 100 MG: 50 TABLET ORAL at 11:11

## 2021-01-01 RX ADMIN — CEFTRIAXONE 2 G: 2 INJECTION, SOLUTION INTRAVENOUS at 05:12

## 2021-01-01 RX ADMIN — CLONIDINE HYDROCHLORIDE 0.3 MG: 0.1 TABLET ORAL at 12:12

## 2021-01-01 RX ADMIN — METHOCARBAMOL 500 MG: 500 TABLET ORAL at 02:12

## 2021-01-01 RX ADMIN — FUROSEMIDE 40 MG: 10 INJECTION, SOLUTION INTRAVENOUS at 11:11

## 2021-01-01 RX ADMIN — GABAPENTIN 400 MG: 400 CAPSULE ORAL at 08:12

## 2021-01-01 RX ADMIN — CLONIDINE HYDROCHLORIDE 0.2 MG: 0.1 TABLET ORAL at 05:12

## 2021-01-01 RX ADMIN — NICOTINE 1 PATCH: 7 PATCH TRANSDERMAL at 11:11

## 2021-01-01 RX ADMIN — NICOTINE 1 PATCH: 7 PATCH TRANSDERMAL at 09:11

## 2021-01-01 RX ADMIN — PIPERACILLIN SODIUM AND TAZOBACTAM SODIUM 4.5 G: 4; .5 INJECTION, POWDER, FOR SOLUTION INTRAVENOUS at 03:12

## 2021-01-01 RX ADMIN — HYDROMORPHONE HYDROCHLORIDE 0.4 MG: 2 INJECTION INTRAMUSCULAR; INTRAVENOUS; SUBCUTANEOUS at 09:11

## 2021-01-01 RX ADMIN — CLONIDINE HYDROCHLORIDE 0.3 MG: 0.1 TABLET ORAL at 11:11

## 2021-01-01 RX ADMIN — PIPERACILLIN SODIUM AND TAZOBACTAM SODIUM 4.5 G: 4; .5 INJECTION, POWDER, FOR SOLUTION INTRAVENOUS at 11:11

## 2021-01-01 RX ADMIN — ATORVASTATIN CALCIUM 40 MG: 20 TABLET, FILM COATED ORAL at 08:11

## 2021-01-01 RX ADMIN — ACETAMINOPHEN 1000 MG: 500 TABLET ORAL at 06:12

## 2021-01-01 RX ADMIN — CLINDAMYCIN HYDROCHLORIDE 300 MG: 150 CAPSULE ORAL at 06:12

## 2021-01-01 RX ADMIN — LABETALOL HYDROCHLORIDE 100 MG: 100 TABLET, FILM COATED ORAL at 11:11

## 2021-01-01 RX ADMIN — FENTANYL CITRATE 25 MCG: 50 INJECTION, SOLUTION INTRAMUSCULAR; INTRAVENOUS at 03:12

## 2021-01-01 RX ADMIN — CLONIDINE HYDROCHLORIDE 0.3 MG: 0.1 TABLET ORAL at 01:12

## 2021-01-01 RX ADMIN — NIFEDIPINE 30 MG: 30 TABLET, FILM COATED, EXTENDED RELEASE ORAL at 09:12

## 2021-01-01 RX ADMIN — PANTOPRAZOLE SODIUM 40 MG: 40 TABLET, DELAYED RELEASE ORAL at 11:11

## 2021-01-01 RX ADMIN — HYDRALAZINE HYDROCHLORIDE 100 MG: 50 TABLET ORAL at 02:12

## 2021-01-01 RX ADMIN — ATORVASTATIN CALCIUM 40 MG: 20 TABLET, FILM COATED ORAL at 09:11

## 2021-01-01 RX ADMIN — CHLORTHALIDONE 12.5 MG: 25 TABLET ORAL at 09:12

## 2021-01-01 RX ADMIN — HYDRALAZINE HYDROCHLORIDE 100 MG: 50 TABLET ORAL at 01:11

## 2021-01-01 RX ADMIN — LABETALOL HYDROCHLORIDE 100 MG: 100 TABLET, FILM COATED ORAL at 09:11

## 2021-01-01 RX ADMIN — HYDRALAZINE HYDROCHLORIDE 100 MG: 25 TABLET, FILM COATED ORAL at 11:11

## 2021-01-01 RX ADMIN — HYDRALAZINE HYDROCHLORIDE 100 MG: 50 TABLET ORAL at 09:11

## 2021-01-01 RX ADMIN — ACETAMINOPHEN 1000 MG: 500 TABLET ORAL at 01:12

## 2021-01-01 RX ADMIN — CLONIDINE HYDROCHLORIDE 0.3 MG: 0.1 TABLET ORAL at 06:12

## 2021-01-01 RX ADMIN — CLONIDINE HYDROCHLORIDE 0.3 MG: 0.1 TABLET ORAL at 12:09

## 2021-01-01 RX ADMIN — FAMOTIDINE 20 MG: 10 INJECTION, SOLUTION INTRAVENOUS at 09:11

## 2021-01-01 RX ADMIN — METHADONE HYDROCHLORIDE 20 MG: 10 TABLET ORAL at 09:12

## 2021-01-01 RX ADMIN — METHOCARBAMOL 750 MG: 750 TABLET ORAL at 02:12

## 2021-01-01 RX ADMIN — LABETALOL HYDROCHLORIDE 5 MG: 5 INJECTION, SOLUTION INTRAVENOUS at 09:11

## 2021-01-01 RX ADMIN — INSULIN ASPART 2 UNITS: 100 INJECTION, SOLUTION INTRAVENOUS; SUBCUTANEOUS at 08:12

## 2021-01-01 RX ADMIN — NICOTINE 1 PATCH: 14 PATCH, EXTENDED RELEASE TRANSDERMAL at 10:11

## 2021-01-01 RX ADMIN — BUPIVACAINE HYDROCHLORIDE 20 ML: 5 INJECTION, SOLUTION EPIDURAL; INTRACAUDAL at 03:12

## 2021-01-01 RX ADMIN — MUPIROCIN: 20 OINTMENT TOPICAL at 09:12

## 2021-01-01 RX ADMIN — PROPOFOL 120 MG: 10 INJECTION, EMULSION INTRAVENOUS at 03:12

## 2021-01-01 RX ADMIN — METHOCARBAMOL 750 MG: 750 TABLET ORAL at 05:12

## 2021-01-01 RX ADMIN — LIDOCAINE HYDROCHLORIDE 100 MG: 20 INJECTION, SOLUTION INTRAVENOUS at 03:12

## 2021-01-01 RX ADMIN — PANTOPRAZOLE SODIUM 40 MG: 40 TABLET, DELAYED RELEASE ORAL at 08:12

## 2021-01-01 RX ADMIN — METHADONE HYDROCHLORIDE 30 MG: 10 TABLET ORAL at 09:11

## 2021-01-01 RX ADMIN — CLONIDINE HYDROCHLORIDE 0.3 MG: 0.1 TABLET ORAL at 12:11

## 2021-01-01 RX ADMIN — CLINDAMYCIN HYDROCHLORIDE 300 MG: 150 CAPSULE ORAL at 12:11

## 2021-01-01 RX ADMIN — CARVEDILOL 6.25 MG: 6.25 TABLET, FILM COATED ORAL at 08:12

## 2021-01-01 RX ADMIN — TRAMADOL HYDROCHLORIDE 50 MG: 50 TABLET ORAL at 02:12

## 2021-01-01 RX ADMIN — POLYETHYLENE GLYCOL 3350 17 G: 17 POWDER, FOR SOLUTION ORAL at 01:11

## 2021-01-01 RX ADMIN — CLINDAMYCIN HYDROCHLORIDE 300 MG: 150 CAPSULE ORAL at 01:11

## 2021-01-01 RX ADMIN — METHADONE HYDROCHLORIDE 40 MG: 10 TABLET ORAL at 08:11

## 2021-01-01 RX ADMIN — INSULIN ASPART 2 UNITS: 100 INJECTION, SOLUTION INTRAVENOUS; SUBCUTANEOUS at 10:12

## 2021-01-01 RX ADMIN — PIPERACILLIN SODIUM AND TAZOBACTAM SODIUM 4.5 G: 4; .5 INJECTION, POWDER, FOR SOLUTION INTRAVENOUS at 08:11

## 2021-01-01 RX ADMIN — HYDRALAZINE HYDROCHLORIDE 100 MG: 50 TABLET ORAL at 01:12

## 2021-01-01 RX ADMIN — ENOXAPARIN SODIUM 40 MG: 40 INJECTION SUBCUTANEOUS at 04:12

## 2021-01-01 RX ADMIN — CEFTRIAXONE 2 G: 2 INJECTION, SOLUTION INTRAVENOUS at 03:12

## 2021-01-01 RX ADMIN — PANTOPRAZOLE SODIUM 40 MG: 40 TABLET, DELAYED RELEASE ORAL at 09:11

## 2021-01-01 RX ADMIN — CLONIDINE HYDROCHLORIDE 0.3 MG: 0.1 TABLET ORAL at 05:12

## 2021-01-01 RX ADMIN — CALCIUM CARBONATE (ANTACID) CHEW TAB 500 MG 500 MG: 500 CHEW TAB at 11:11

## 2021-01-01 RX ADMIN — NIFEDIPINE 120 MG: 30 TABLET, FILM COATED, EXTENDED RELEASE ORAL at 08:12

## 2021-01-01 RX ADMIN — CHLORTHALIDONE 100 MG: 25 TABLET ORAL at 09:12

## 2021-01-01 RX ADMIN — NIFEDIPINE 120 MG: 30 TABLET, FILM COATED, EXTENDED RELEASE ORAL at 09:12

## 2021-01-01 RX ADMIN — METHADONE HYDROCHLORIDE 30 MG: 10 TABLET ORAL at 12:09

## 2021-01-01 RX ADMIN — METHADONE HYDROCHLORIDE 60 MG: 10 TABLET ORAL at 09:11

## 2021-01-01 RX ADMIN — LOSARTAN POTASSIUM 100 MG: 50 TABLET, FILM COATED ORAL at 09:11

## 2021-01-01 RX ADMIN — RNA INGREDIENT BNT-162B2 0.3 ML: 0.23 INJECTION, SUSPENSION INTRAMUSCULAR at 01:12

## 2021-01-01 RX ADMIN — VANCOMYCIN HYDROCHLORIDE 1500 MG: 1.5 INJECTION, POWDER, LYOPHILIZED, FOR SOLUTION INTRAVENOUS at 11:12

## 2021-01-01 RX ADMIN — HYDRALAZINE HYDROCHLORIDE 10 MG: 20 INJECTION INTRAMUSCULAR; INTRAVENOUS at 08:12

## 2021-01-01 RX ADMIN — ROPIVACAINE HYDROCHLORIDE 7 ML: 2 INJECTION, SOLUTION EPIDURAL; INFILTRATION at 07:12

## 2021-01-01 RX ADMIN — CLONIDINE HYDROCHLORIDE 0.3 MG: 0.1 TABLET ORAL at 11:12

## 2021-01-01 RX ADMIN — METHADONE HYDROCHLORIDE 25 MG: 10 TABLET ORAL at 09:12

## 2021-01-01 RX ADMIN — PIPERACILLIN SODIUM AND TAZOBACTAM SODIUM 4.5 G: 4; .5 INJECTION, POWDER, FOR SOLUTION INTRAVENOUS at 12:11

## 2021-01-01 RX ADMIN — ROPIVACAINE HYDROCHLORIDE 7 ML: 2 INJECTION, SOLUTION EPIDURAL; INFILTRATION at 09:12

## 2021-01-01 RX ADMIN — METHADONE HYDROCHLORIDE 30 MG: 10 TABLET ORAL at 08:12

## 2021-01-01 RX ADMIN — ONDANSETRON 4 MG: 2 INJECTION INTRAMUSCULAR; INTRAVENOUS at 07:12

## 2021-01-01 RX ADMIN — SODIUM CHLORIDE: 0.9 INJECTION, SOLUTION INTRAVENOUS at 02:12

## 2021-01-01 RX ADMIN — PIPERACILLIN SODIUM AND TAZOBACTAM SODIUM 4.5 G: 4; .5 INJECTION, POWDER, FOR SOLUTION INTRAVENOUS at 01:12

## 2021-01-01 RX ADMIN — LABETALOL HYDROCHLORIDE 100 MG: 100 TABLET, FILM COATED ORAL at 10:11

## 2021-01-01 RX ADMIN — ROPIVACAINE HYDROCHLORIDE 7 ML: 2 INJECTION, SOLUTION EPIDURAL; INFILTRATION at 06:12

## 2021-01-01 RX ADMIN — INSULIN ASPART 4 UNITS: 100 INJECTION, SOLUTION INTRAVENOUS; SUBCUTANEOUS at 05:12

## 2021-01-01 RX ADMIN — METHADONE HYDROCHLORIDE 20 MG: 10 TABLET ORAL at 08:12

## 2021-01-01 RX ADMIN — MIDAZOLAM HYDROCHLORIDE 1 MG: 1 INJECTION, SOLUTION INTRAMUSCULAR; INTRAVENOUS at 02:12

## 2021-01-01 RX ADMIN — CELECOXIB 200 MG: 200 CAPSULE ORAL at 08:12

## 2021-01-01 RX ADMIN — METHADONE HYDROCHLORIDE 100 MG: 10 TABLET ORAL at 01:11

## 2021-01-01 RX ADMIN — PIPERACILLIN SODIUM AND TAZOBACTAM SODIUM 4.5 G: 4; .5 INJECTION, POWDER, FOR SOLUTION INTRAVENOUS at 10:11

## 2021-01-01 RX ADMIN — CIPROFLOXACIN 750 MG: 250 TABLET ORAL at 08:12

## 2021-02-01 ENCOUNTER — TELEPHONE (OUTPATIENT)
Dept: CARDIOLOGY | Facility: CLINIC | Age: 46
End: 2021-02-01

## 2021-11-27 PROBLEM — E11.9 TYPE 2 DIABETES MELLITUS: Status: ACTIVE | Noted: 2021-01-01

## 2021-11-27 PROBLEM — I96 GANGRENE: Status: ACTIVE | Noted: 2021-01-01

## 2021-11-28 PROBLEM — A48.0: Status: ACTIVE | Noted: 2021-01-01

## 2021-11-29 PROBLEM — F11.93 OPIOID WITHDRAWAL: Status: ACTIVE | Noted: 2021-01-01

## 2021-11-29 PROBLEM — R78.81 GRAM-NEGATIVE BACTEREMIA: Status: ACTIVE | Noted: 2021-01-01

## 2021-11-29 PROBLEM — F11.90 OPIOID USE DISORDER: Status: ACTIVE | Noted: 2021-01-01

## 2021-12-02 PROBLEM — L97.921: Status: ACTIVE | Noted: 2021-01-01

## 2021-12-05 PROBLEM — A48.0: Status: RESOLVED | Noted: 2021-01-01 | Resolved: 2021-01-01

## 2021-12-05 PROBLEM — M86.9 OSTEOMYELITIS OF RIGHT FOOT: Status: RESOLVED | Noted: 2020-09-05 | Resolved: 2021-01-01

## 2021-12-05 PROBLEM — L97.921: Status: RESOLVED | Noted: 2021-01-01 | Resolved: 2021-01-01

## 2021-12-05 PROBLEM — I96 GANGRENE: Status: RESOLVED | Noted: 2021-01-01 | Resolved: 2021-01-01

## 2022-01-01 ENCOUNTER — TELEPHONE (OUTPATIENT)
Dept: VASCULAR SURGERY | Facility: CLINIC | Age: 47
End: 2022-01-01
Payer: MEDICAID

## 2022-01-01 ENCOUNTER — HOSPITAL ENCOUNTER (EMERGENCY)
Facility: HOSPITAL | Age: 47
End: 2022-03-08
Attending: EMERGENCY MEDICINE
Payer: MEDICAID

## 2022-01-01 ENCOUNTER — TELEPHONE (OUTPATIENT)
Dept: INFECTIOUS DISEASES | Facility: CLINIC | Age: 47
End: 2022-01-01
Payer: MEDICAID

## 2022-01-01 ENCOUNTER — OFFICE VISIT (OUTPATIENT)
Dept: VASCULAR SURGERY | Facility: CLINIC | Age: 47
End: 2022-01-01
Payer: MEDICAID

## 2022-01-01 ENCOUNTER — CLINICAL SUPPORT (OUTPATIENT)
Dept: REHABILITATION | Facility: HOSPITAL | Age: 47
End: 2022-01-01
Payer: MEDICAID

## 2022-01-01 ENCOUNTER — PATIENT MESSAGE (OUTPATIENT)
Dept: INFECTIOUS DISEASES | Facility: CLINIC | Age: 47
End: 2022-01-01
Payer: MEDICAID

## 2022-01-01 ENCOUNTER — OFFICE VISIT (OUTPATIENT)
Dept: VASCULAR SURGERY | Facility: CLINIC | Age: 47
End: 2022-01-01
Attending: SURGERY
Payer: MEDICAID

## 2022-01-01 ENCOUNTER — OFFICE VISIT (OUTPATIENT)
Dept: HEPATOLOGY | Facility: CLINIC | Age: 47
End: 2022-01-01
Payer: MEDICAID

## 2022-01-01 ENCOUNTER — TELEPHONE (OUTPATIENT)
Dept: EMERGENCY MEDICINE | Facility: OTHER | Age: 47
End: 2022-01-01
Payer: MEDICAID

## 2022-01-01 ENCOUNTER — TELEPHONE (OUTPATIENT)
Dept: PODIATRY | Facility: CLINIC | Age: 47
End: 2022-01-01
Payer: MEDICAID

## 2022-01-01 ENCOUNTER — IMMUNIZATION (OUTPATIENT)
Dept: INTERNAL MEDICINE | Facility: CLINIC | Age: 47
End: 2022-01-01
Payer: MEDICAID

## 2022-01-01 ENCOUNTER — HOSPITAL ENCOUNTER (OUTPATIENT)
Dept: RADIOLOGY | Facility: HOSPITAL | Age: 47
Discharge: HOME | End: 2022-01-24
Attending: NURSE PRACTITIONER
Payer: MEDICAID

## 2022-01-01 ENCOUNTER — OFFICE VISIT (OUTPATIENT)
Dept: INFECTIOUS DISEASES | Facility: CLINIC | Age: 47
End: 2022-01-01
Payer: MEDICAID

## 2022-01-01 ENCOUNTER — HOSPITAL ENCOUNTER (EMERGENCY)
Facility: HOSPITAL | Age: 47
Discharge: LEFT BEFORE DISCHARGE | End: 2022-03-03
Attending: EMERGENCY MEDICINE
Payer: MEDICAID

## 2022-01-01 ENCOUNTER — TELEPHONE (OUTPATIENT)
Dept: CARDIOTHORACIC SURGERY | Facility: CLINIC | Age: 47
End: 2022-01-01
Payer: MEDICAID

## 2022-01-01 ENCOUNTER — TELEPHONE (OUTPATIENT)
Dept: HEPATOLOGY | Facility: CLINIC | Age: 47
End: 2022-01-01
Payer: MEDICAID

## 2022-01-01 VITALS
WEIGHT: 230 LBS | TEMPERATURE: 99 F | RESPIRATION RATE: 18 BRPM | HEIGHT: 72 IN | OXYGEN SATURATION: 97 % | SYSTOLIC BLOOD PRESSURE: 152 MMHG | DIASTOLIC BLOOD PRESSURE: 100 MMHG | BODY MASS INDEX: 31.15 KG/M2 | HEART RATE: 109 BPM

## 2022-01-01 VITALS
HEIGHT: 73 IN | TEMPERATURE: 99 F | DIASTOLIC BLOOD PRESSURE: 87 MMHG | BODY MASS INDEX: 32.32 KG/M2 | SYSTOLIC BLOOD PRESSURE: 168 MMHG | HEART RATE: 86 BPM

## 2022-01-01 VITALS
SYSTOLIC BLOOD PRESSURE: 178 MMHG | HEART RATE: 101 BPM | RESPIRATION RATE: 18 BRPM | DIASTOLIC BLOOD PRESSURE: 97 MMHG | OXYGEN SATURATION: 94 % | TEMPERATURE: 98 F

## 2022-01-01 VITALS
TEMPERATURE: 96 F | BODY MASS INDEX: 31.15 KG/M2 | WEIGHT: 230 LBS | OXYGEN SATURATION: 95 % | DIASTOLIC BLOOD PRESSURE: 98 MMHG | HEART RATE: 114 BPM | RESPIRATION RATE: 18 BRPM | SYSTOLIC BLOOD PRESSURE: 139 MMHG | HEIGHT: 72 IN

## 2022-01-01 VITALS
HEART RATE: 78 BPM | WEIGHT: 245 LBS | DIASTOLIC BLOOD PRESSURE: 88 MMHG | TEMPERATURE: 99 F | HEIGHT: 73 IN | SYSTOLIC BLOOD PRESSURE: 188 MMHG | BODY MASS INDEX: 32.47 KG/M2

## 2022-01-01 DIAGNOSIS — Z23 NEED FOR VACCINATION: Primary | ICD-10-CM

## 2022-01-01 DIAGNOSIS — R26.89 AMPUTEE GAIT: Primary | ICD-10-CM

## 2022-01-01 DIAGNOSIS — Z89.611 S/P ABOVE KNEE AMPUTATION, RIGHT: Primary | ICD-10-CM

## 2022-01-01 DIAGNOSIS — B17.10 ACUTE HEPATITIS C VIRUS INFECTION WITHOUT HEPATIC COMA: ICD-10-CM

## 2022-01-01 DIAGNOSIS — B18.2 CHRONIC HEPATITIS C WITHOUT HEPATIC COMA: Primary | ICD-10-CM

## 2022-01-01 DIAGNOSIS — Z89.611 S/P ABOVE KNEE AMPUTATION, RIGHT: ICD-10-CM

## 2022-01-01 DIAGNOSIS — R68.89 MULTIPLE COMPLAINTS: ICD-10-CM

## 2022-01-01 DIAGNOSIS — M79.662 PAIN OF LEFT LOWER LEG: ICD-10-CM

## 2022-01-01 DIAGNOSIS — R26.89 AMPUTEE GAIT: ICD-10-CM

## 2022-01-01 DIAGNOSIS — R60.0 LOCALIZED EDEMA: Primary | ICD-10-CM

## 2022-01-01 DIAGNOSIS — Z74.09 IMPAIRED TRANSFERS: ICD-10-CM

## 2022-01-01 DIAGNOSIS — Z89.9 AMPUTEE GAIT: Primary | ICD-10-CM

## 2022-01-01 DIAGNOSIS — Z89.9 AMPUTEE GAIT: ICD-10-CM

## 2022-01-01 DIAGNOSIS — M86.9 OSTEOMYELITIS OF RIGHT FOOT, UNSPECIFIED TYPE: Primary | ICD-10-CM

## 2022-01-01 DIAGNOSIS — R06.02 SOB (SHORTNESS OF BREATH): ICD-10-CM

## 2022-01-01 DIAGNOSIS — R60.0 LOCALIZED EDEMA: ICD-10-CM

## 2022-01-01 DIAGNOSIS — I46.9 CARDIAC ARREST: Primary | ICD-10-CM

## 2022-01-01 DIAGNOSIS — B17.10 ACUTE HEPATITIS C VIRUS INFECTION WITHOUT HEPATIC COMA: Primary | ICD-10-CM

## 2022-01-01 DIAGNOSIS — R06.02 SHORTNESS OF BREATH: ICD-10-CM

## 2022-01-01 LAB
ACID FAST MOD KINY STN SPEC: NORMAL
ACID FAST MOD KINY STN SPEC: NORMAL
ALBUMIN SERPL BCP-MCNC: 2.9 G/DL (ref 3.5–5.2)
ALP SERPL-CCNC: 137 U/L (ref 55–135)
ALT SERPL W/O P-5'-P-CCNC: 20 U/L (ref 10–44)
ANION GAP SERPL CALC-SCNC: 14 MMOL/L (ref 8–16)
AST SERPL-CCNC: 26 U/L (ref 10–40)
BASOPHILS # BLD AUTO: 0.11 K/UL (ref 0–0.2)
BASOPHILS NFR BLD: 0.9 % (ref 0–1.9)
BILIRUB SERPL-MCNC: 0.5 MG/DL (ref 0.1–1)
BNP SERPL-MCNC: 121 PG/ML (ref 0–99)
BUN SERPL-MCNC: 31 MG/DL (ref 6–20)
CALCIUM SERPL-MCNC: 9.5 MG/DL (ref 8.7–10.5)
CHLORIDE SERPL-SCNC: 97 MMOL/L (ref 95–110)
CO2 SERPL-SCNC: 20 MMOL/L (ref 23–29)
CREAT SERPL-MCNC: 1.1 MG/DL (ref 0.5–1.4)
CTP QC/QA: YES
DIFFERENTIAL METHOD: ABNORMAL
EOSINOPHIL # BLD AUTO: 1.4 K/UL (ref 0–0.5)
EOSINOPHIL NFR BLD: 11.6 % (ref 0–8)
ERYTHROCYTE [DISTWIDTH] IN BLOOD BY AUTOMATED COUNT: 13.3 % (ref 11.5–14.5)
EST. GFR  (AFRICAN AMERICAN): >60 ML/MIN/1.73 M^2
EST. GFR  (NON AFRICAN AMERICAN): >60 ML/MIN/1.73 M^2
GLUCOSE SERPL-MCNC: 99 MG/DL (ref 70–110)
HCT VFR BLD AUTO: 32.7 % (ref 40–54)
HCV AB SERPL QL IA: POSITIVE
HGB BLD-MCNC: 11.1 G/DL (ref 14–18)
IMM GRANULOCYTES # BLD AUTO: 0.04 K/UL (ref 0–0.04)
IMM GRANULOCYTES NFR BLD AUTO: 0.3 % (ref 0–0.5)
LYMPHOCYTES # BLD AUTO: 2.2 K/UL (ref 1–4.8)
LYMPHOCYTES NFR BLD: 17.5 % (ref 18–48)
MCH RBC QN AUTO: 29 PG (ref 27–31)
MCHC RBC AUTO-ENTMCNC: 33.9 G/DL (ref 32–36)
MCV RBC AUTO: 85 FL (ref 82–98)
MONOCYTES # BLD AUTO: 1.2 K/UL (ref 0.3–1)
MONOCYTES NFR BLD: 9.8 % (ref 4–15)
MYCOBACTERIUM SPEC QL CULT: NORMAL
MYCOBACTERIUM SPEC QL CULT: NORMAL
NEUTROPHILS # BLD AUTO: 7.4 K/UL (ref 1.8–7.7)
NEUTROPHILS NFR BLD: 59.9 % (ref 38–73)
NRBC BLD-RTO: 0 /100 WBC
PLATELET # BLD AUTO: 346 K/UL (ref 150–450)
PMV BLD AUTO: 9.1 FL (ref 9.2–12.9)
POCT GLUCOSE: 255 MG/DL (ref 70–110)
POTASSIUM SERPL-SCNC: 4.8 MMOL/L (ref 3.5–5.1)
PROT SERPL-MCNC: 9.2 G/DL (ref 6–8.4)
RBC # BLD AUTO: 3.83 M/UL (ref 4.6–6.2)
SARS-COV-2 RDRP RESP QL NAA+PROBE: NEGATIVE
SODIUM SERPL-SCNC: 131 MMOL/L (ref 136–145)
TROPONIN I SERPL DL<=0.01 NG/ML-MCNC: 0.04 NG/ML (ref 0–0.03)
WBC # BLD AUTO: 12.28 K/UL (ref 3.9–12.7)

## 2022-01-01 PROCEDURE — 99999 PR PBB SHADOW E&M-EST. PATIENT-LVL IV: CPT | Mod: PBBFAC,,, | Performed by: PHYSICIAN ASSISTANT

## 2022-01-01 PROCEDURE — 3008F BODY MASS INDEX DOCD: CPT | Mod: CPTII,,, | Performed by: PHYSICIAN ASSISTANT

## 2022-01-01 PROCEDURE — 63600175 PHARM REV CODE 636 W HCPCS: Performed by: EMERGENCY MEDICINE

## 2022-01-01 PROCEDURE — 1159F MED LIST DOCD IN RCRD: CPT | Mod: CPTII,,, | Performed by: PHYSICIAN ASSISTANT

## 2022-01-01 PROCEDURE — 99214 OFFICE O/P EST MOD 30 MIN: CPT | Mod: S$PBB,,, | Performed by: PHYSICIAN ASSISTANT

## 2022-01-01 PROCEDURE — 99024 PR POST-OP FOLLOW-UP VISIT: ICD-10-PCS | Mod: ,,, | Performed by: SURGERY

## 2022-01-01 PROCEDURE — 3079F DIAST BP 80-89 MM HG: CPT | Mod: CPTII,,, | Performed by: PHYSICIAN ASSISTANT

## 2022-01-01 PROCEDURE — 99284 PR EMERGENCY DEPT VISIT,LEVEL IV: ICD-10-PCS | Mod: CS,,, | Performed by: EMERGENCY MEDICINE

## 2022-01-01 PROCEDURE — 1160F RVW MEDS BY RX/DR IN RCRD: CPT | Mod: CPTII,,, | Performed by: PHYSICIAN ASSISTANT

## 2022-01-01 PROCEDURE — 99291 CRITICAL CARE FIRST HOUR: CPT | Mod: 25,CS,, | Performed by: EMERGENCY MEDICINE

## 2022-01-01 PROCEDURE — 93005 ELECTROCARDIOGRAM TRACING: CPT

## 2022-01-01 PROCEDURE — 3077F SYST BP >= 140 MM HG: CPT | Mod: CPTII,,, | Performed by: PHYSICIAN ASSISTANT

## 2022-01-01 PROCEDURE — 36000 PLACE NEEDLE IN VEIN: CPT

## 2022-01-01 PROCEDURE — 93010 EKG 12-LEAD: ICD-10-PCS | Mod: ,,, | Performed by: INTERNAL MEDICINE

## 2022-01-01 PROCEDURE — 3077F PR MOST RECENT SYSTOLIC BLOOD PRESSURE >= 140 MM HG: ICD-10-PCS | Mod: CPTII,,, | Performed by: SURGERY

## 2022-01-01 PROCEDURE — 36680 INSERT NEEDLE BONE CAVITY: CPT | Mod: LT

## 2022-01-01 PROCEDURE — 3077F SYST BP >= 140 MM HG: CPT | Mod: CPTII,,, | Performed by: SURGERY

## 2022-01-01 PROCEDURE — 1159F MED LIST DOCD IN RCRD: CPT | Mod: CPTII,,, | Performed by: SURGERY

## 2022-01-01 PROCEDURE — 1159F PR MEDICATION LIST DOCUMENTED IN MEDICAL RECORD: ICD-10-PCS | Mod: CPTII,,, | Performed by: PHYSICIAN ASSISTANT

## 2022-01-01 PROCEDURE — 36680 INSERT NEEDLE BONE CAVITY: CPT | Mod: ,,, | Performed by: EMERGENCY MEDICINE

## 2022-01-01 PROCEDURE — 99999 PR PBB SHADOW E&M-EST. PATIENT-LVL III: CPT | Mod: PBBFAC,,, | Performed by: SURGERY

## 2022-01-01 PROCEDURE — 3079F PR MOST RECENT DIASTOLIC BLOOD PRESSURE 80-89 MM HG: ICD-10-PCS | Mod: CPTII,,, | Performed by: SURGERY

## 2022-01-01 PROCEDURE — 0002A COVID-19, MRNA, LNP-S, PF, 30 MCG/0.3 ML DOSE VACCINE: CPT | Mod: PBBFAC,CV19

## 2022-01-01 PROCEDURE — 83880 ASSAY OF NATRIURETIC PEPTIDE: CPT | Performed by: EMERGENCY MEDICINE

## 2022-01-01 PROCEDURE — 99285 EMERGENCY DEPT VISIT HI MDM: CPT | Mod: 25

## 2022-01-01 PROCEDURE — 93971 EXTREMITY STUDY: CPT | Mod: TC,PO,LT

## 2022-01-01 PROCEDURE — 3079F PR MOST RECENT DIASTOLIC BLOOD PRESSURE 80-89 MM HG: ICD-10-PCS | Mod: CPTII,,, | Performed by: PHYSICIAN ASSISTANT

## 2022-01-01 PROCEDURE — 99213 OFFICE O/P EST LOW 20 MIN: CPT | Mod: PBBFAC | Performed by: SURGERY

## 2022-01-01 PROCEDURE — 99024 POSTOP FOLLOW-UP VISIT: CPT | Mod: ,,, | Performed by: SURGERY

## 2022-01-01 PROCEDURE — 1160F PR REVIEW ALL MEDS BY PRESCRIBER/CLIN PHARMACIST DOCUMENTED: ICD-10-PCS | Mod: CPTII,,, | Performed by: PHYSICIAN ASSISTANT

## 2022-01-01 PROCEDURE — 85025 COMPLETE CBC W/AUTO DIFF WBC: CPT | Performed by: EMERGENCY MEDICINE

## 2022-01-01 PROCEDURE — 25000003 PHARM REV CODE 250: Performed by: EMERGENCY MEDICINE

## 2022-01-01 PROCEDURE — 31500 PR INSERT, EMERGENCY ENDOTRACH AIRWAY: ICD-10-PCS | Mod: ,,, | Performed by: EMERGENCY MEDICINE

## 2022-01-01 PROCEDURE — 99284 EMERGENCY DEPT VISIT MOD MDM: CPT | Mod: CS,,, | Performed by: EMERGENCY MEDICINE

## 2022-01-01 PROCEDURE — 99291 PR CRITICAL CARE, E/M 30-74 MINUTES: ICD-10-PCS | Mod: 25,CS,, | Performed by: EMERGENCY MEDICINE

## 2022-01-01 PROCEDURE — 97110 THERAPEUTIC EXERCISES: CPT | Mod: PO | Performed by: PHYSICAL THERAPIST

## 2022-01-01 PROCEDURE — 99999 PR PBB SHADOW E&M-EST. PATIENT-LVL IV: ICD-10-PCS | Mod: PBBFAC,,, | Performed by: PHYSICIAN ASSISTANT

## 2022-01-01 PROCEDURE — 3008F PR BODY MASS INDEX (BMI) DOCUMENTED: ICD-10-PCS | Mod: CPTII,,, | Performed by: PHYSICIAN ASSISTANT

## 2022-01-01 PROCEDURE — 97163 PT EVAL HIGH COMPLEX 45 MIN: CPT | Mod: PO

## 2022-01-01 PROCEDURE — 3080F DIAST BP >= 90 MM HG: CPT | Mod: CPTII,,, | Performed by: PHYSICIAN ASSISTANT

## 2022-01-01 PROCEDURE — 93010 ELECTROCARDIOGRAM REPORT: CPT | Mod: ,,, | Performed by: INTERNAL MEDICINE

## 2022-01-01 PROCEDURE — 84484 ASSAY OF TROPONIN QUANT: CPT | Performed by: EMERGENCY MEDICINE

## 2022-01-01 PROCEDURE — 99203 OFFICE O/P NEW LOW 30 MIN: CPT | Mod: S$PBB,,, | Performed by: PHYSICIAN ASSISTANT

## 2022-01-01 PROCEDURE — U0002 COVID-19 LAB TEST NON-CDC: HCPCS | Performed by: EMERGENCY MEDICINE

## 2022-01-01 PROCEDURE — 3079F DIAST BP 80-89 MM HG: CPT | Mod: CPTII,,, | Performed by: SURGERY

## 2022-01-01 PROCEDURE — 99203 PR OFFICE/OUTPT VISIT, NEW, LEVL III, 30-44 MIN: ICD-10-PCS | Mod: S$PBB,,, | Performed by: PHYSICIAN ASSISTANT

## 2022-01-01 PROCEDURE — 99999 PR PBB SHADOW E&M-EST. PATIENT-LVL III: ICD-10-PCS | Mod: PBBFAC,,, | Performed by: SURGERY

## 2022-01-01 PROCEDURE — 99291 CRITICAL CARE FIRST HOUR: CPT | Mod: 25

## 2022-01-01 PROCEDURE — 86803 HEPATITIS C AB TEST: CPT | Performed by: EMERGENCY MEDICINE

## 2022-01-01 PROCEDURE — 3008F BODY MASS INDEX DOCD: CPT | Mod: CPTII,,, | Performed by: SURGERY

## 2022-01-01 PROCEDURE — 99214 PR OFFICE/OUTPT VISIT, EST, LEVL IV, 30-39 MIN: ICD-10-PCS | Mod: S$PBB,,, | Performed by: PHYSICIAN ASSISTANT

## 2022-01-01 PROCEDURE — 99999 PR PBB SHADOW E&M-EST. PATIENT-LVL V: ICD-10-PCS | Mod: PBBFAC,,, | Performed by: PHYSICIAN ASSISTANT

## 2022-01-01 PROCEDURE — 31500 INSERT EMERGENCY AIRWAY: CPT | Mod: ,,, | Performed by: EMERGENCY MEDICINE

## 2022-01-01 PROCEDURE — 3077F PR MOST RECENT SYSTOLIC BLOOD PRESSURE >= 140 MM HG: ICD-10-PCS | Mod: CPTII,,, | Performed by: PHYSICIAN ASSISTANT

## 2022-01-01 PROCEDURE — 1159F PR MEDICATION LIST DOCUMENTED IN MEDICAL RECORD: ICD-10-PCS | Mod: CPTII,,, | Performed by: SURGERY

## 2022-01-01 PROCEDURE — 3075F SYST BP GE 130 - 139MM HG: CPT | Mod: CPTII,,, | Performed by: PHYSICIAN ASSISTANT

## 2022-01-01 PROCEDURE — 3008F PR BODY MASS INDEX (BMI) DOCUMENTED: ICD-10-PCS | Mod: CPTII,,, | Performed by: SURGERY

## 2022-01-01 PROCEDURE — 3075F PR MOST RECENT SYSTOLIC BLOOD PRESS GE 130-139MM HG: ICD-10-PCS | Mod: CPTII,,, | Performed by: PHYSICIAN ASSISTANT

## 2022-01-01 PROCEDURE — 92950 HEART/LUNG RESUSCITATION CPR: CPT

## 2022-01-01 PROCEDURE — 36680 PR INSERT NEEDLE,INTRAOSSEOUS INFUSN: ICD-10-PCS | Mod: ,,, | Performed by: EMERGENCY MEDICINE

## 2022-01-01 PROCEDURE — 3080F PR MOST RECENT DIASTOLIC BLOOD PRESSURE >= 90 MM HG: ICD-10-PCS | Mod: CPTII,,, | Performed by: PHYSICIAN ASSISTANT

## 2022-01-01 PROCEDURE — 99999 PR PBB SHADOW E&M-EST. PATIENT-LVL V: CPT | Mod: PBBFAC,,, | Performed by: PHYSICIAN ASSISTANT

## 2022-01-01 PROCEDURE — 80053 COMPREHEN METABOLIC PANEL: CPT | Performed by: EMERGENCY MEDICINE

## 2022-01-01 PROCEDURE — 63600175 PHARM REV CODE 636 W HCPCS

## 2022-01-01 PROCEDURE — 99215 OFFICE O/P EST HI 40 MIN: CPT | Mod: PBBFAC,25 | Performed by: PHYSICIAN ASSISTANT

## 2022-01-01 PROCEDURE — 99214 OFFICE O/P EST MOD 30 MIN: CPT | Mod: PBBFAC | Performed by: PHYSICIAN ASSISTANT

## 2022-01-01 PROCEDURE — 31500 INSERT EMERGENCY AIRWAY: CPT

## 2022-01-01 RX ORDER — EPINEPHRINE 0.1 MG/ML
INJECTION INTRAVENOUS CODE/TRAUMA/SEDATION MEDICATION
Status: COMPLETED | OUTPATIENT
Start: 2022-01-01 | End: 2022-01-01

## 2022-01-01 RX ORDER — SODIUM BICARBONATE 1 MEQ/ML
SYRINGE (ML) INTRAVENOUS CODE/TRAUMA/SEDATION MEDICATION
Status: COMPLETED | OUTPATIENT
Start: 2022-01-01 | End: 2022-01-01

## 2022-01-01 RX ORDER — NALOXONE HYDROCHLORIDE 1 MG/ML
2 INJECTION INTRAMUSCULAR; INTRAVENOUS; SUBCUTANEOUS
Status: DISCONTINUED | OUTPATIENT
Start: 2022-01-01 | End: 2022-01-01 | Stop reason: HOSPADM

## 2022-01-01 RX ORDER — NALOXONE HYDROCHLORIDE 1 MG/ML
INJECTION INTRAMUSCULAR; INTRAVENOUS; SUBCUTANEOUS
Status: COMPLETED
Start: 2022-01-01 | End: 2022-01-01

## 2022-01-01 RX ORDER — AMIODARONE HYDROCHLORIDE 150 MG/3ML
INJECTION, SOLUTION INTRAVENOUS CODE/TRAUMA/SEDATION MEDICATION
Status: COMPLETED | OUTPATIENT
Start: 2022-01-01 | End: 2022-01-01

## 2022-01-01 RX ORDER — PROMETHAZINE HYDROCHLORIDE 6.25 MG/5ML
SYRUP ORAL
COMMUNITY
Start: 2022-01-01

## 2022-01-01 RX ORDER — CALCIUM CHLORIDE INJECTION 100 MG/ML
INJECTION, SOLUTION INTRAVENOUS CODE/TRAUMA/SEDATION MEDICATION
Status: COMPLETED | OUTPATIENT
Start: 2022-01-01 | End: 2022-01-01

## 2022-01-01 RX ADMIN — CALCIUM CHLORIDE 1 G: 100 INJECTION, SOLUTION INTRAVENOUS at 04:03

## 2022-01-01 RX ADMIN — EPINEPHRINE 1 MG: 0.1 INJECTION, SOLUTION ENDOTRACHEAL; INTRACARDIAC; INTRAVENOUS at 04:03

## 2022-01-01 RX ADMIN — AMIODARONE HYDROCHLORIDE 300 MG: 50 INJECTION, SOLUTION INTRAVENOUS at 04:03

## 2022-01-01 RX ADMIN — NALOXONE HYDROCHLORIDE 2 MG: 1 INJECTION PARENTERAL at 04:03

## 2022-01-01 RX ADMIN — SODIUM BICARBONATE 50 MEQ: 84 INJECTION, SOLUTION INTRAVENOUS at 04:03

## 2022-01-01 RX ADMIN — NALOXONE HYDROCHLORIDE 2 MG: 1 INJECTION INTRAMUSCULAR; INTRAVENOUS; SUBCUTANEOUS at 04:03

## 2022-01-12 NOTE — PROGRESS NOTES
S/p R AKA 12/1/21  Sutures and staples removed  Healing well    He underwent a R AKA in Dec 2021 and he is in need of a first above knee prosthesis. Prior to his amputation, the patient was fully independent in all activities of daily living, enjoying activities such as walking, driving, playing sports, and yard work.  He is very motivated to regain his independence.  The patient would strongly benefit from prosthetic intervention.  Is is very active, a community ambulator and requires a right above knee prosthesis which, in conjunction with the help of physical therapy and gait independence, will enable him to become functional at his fullest potential.    F/u JOEL Hawthorne MD DFSVS FACS   Vascular/Endovascular Surgery

## 2022-01-24 NOTE — TELEPHONE ENCOUNTER
"Spoke with the pt to access his needs to come to the clinic.Pt states that he fell at home and had bleeding from his incision which is currently not bleeding.Ascertained if the incision is open and the pt stated that he couldn't tell. Pt states he's currently at a dr's office appt and stated  "I can't talk like I want". Informed the pt to contact the clinic when his appt is completed.Pt verbalized understanding of information received.  ----- Message from Kelly Alonso sent at 1/24/2022 10:41 AM CST -----  Regarding: Appt Access  Pt called to schedule an appt, advised he has bleeding at his incision site due to a fall and need to be seen this week. Pt is possibly seeking Wednesday for an appt due to him having transportation. Requesting a call back.        [phone]        "

## 2022-01-25 NOTE — TELEPHONE ENCOUNTER
----- Message from Leighann Reyes sent at 1/25/2022  2:55 PM CST -----  Regarding: Reschedule Appt  Contact: Patient  Patient is requesting a call back to reschedule appt missed on 01/21/2022   Patient would like to be seen as soon as possible   Please Assist     Patient can be reached at [phone]

## 2022-01-31 NOTE — PROGRESS NOTES
Subjective:      Patient ID: Nicolas Rock is a 46 y.o. male.    Chief Complaint: Hospital Follow Up    History of Present Illness    HPI    Mr. Rock is a 46 year old male with history of HTN, DMII, drug abuse and right foot infection s/p AKA who is seen today for hospital follow up. Patient was admitted 11/27 with severe diabetic right foot infection with imaging concerning for gas gangrene and osteomyelitis. Patient was started on vanc, zosyn and clindamycin. He underwent surgical I&D and bone biopsy on 11/27. Blood cx and bone cultures returned positive for pan sensitive proteus mirabilis. 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 Patient ultimately underwent AKA 12/1. He clinically improved with clearance of his bacteremia. He was transitioned to Ciprofloxacin x 2 weeks from day of AKA at discharge (end date 12/17).     Patient is seen today for follow up. He has been off antibiotics for over a month. Overall he is doing well. He denies fevers, chills, sweats. He denies stump pain, swelling, warmth. Home health is performing wound care to stump and left foot twice weekly. He reports falling at home last week and noted bloody drainage from incision. Drainage has decreased. Denies purulence. He still has staples in place. No vascular surgery follow up appt is scheduled at this time.      Review of Systems   Constitutional: Negative for chills, fever, malaise/fatigue and night sweats.   HENT: Negative for congestion and sore throat.    Eyes: Negative for visual disturbance.   Cardiovascular: Negative for chest pain and leg swelling.   Respiratory: Negative for cough, shortness of breath and sputum production.    Skin: Positive for dry skin. Negative for color change and itching.        LLE wound   Musculoskeletal: Negative for back pain, joint pain and joint swelling.   Gastrointestinal: Negative for abdominal pain, diarrhea, heartburn, nausea and vomiting.   Genitourinary:  "Negative for dysuria, flank pain and hematuria.   Neurological: Negative for dizziness, numbness and weakness.   Psychiatric/Behavioral: Negative for altered mental status and depression. The patient is not nervous/anxious.      Objective:     Vitals:    02/01/22 0944   BP: (!) 168/87   Pulse: 86   Temp: 98.5 °F (36.9 °C)   TempSrc: Oral   Height: 6' 1" (1.854 m)   PainSc: 0-No pain     Physical Exam  Vitals reviewed.   Constitutional:       General: He is not in acute distress.     Appearance: He is not ill-appearing, toxic-appearing or diaphoretic.   HENT:      Head: Normocephalic and atraumatic.   Eyes:      General: No scleral icterus.     Conjunctiva/sclera: Conjunctivae normal.   Cardiovascular:      Rate and Rhythm: Normal rate and regular rhythm.   Musculoskeletal:         General: Deformity (right AKA) present. No tenderness.      Comments: Right AKA stump c/d/i. Scant bloody drainage noted to middle of incision. No tenderness, fluctuance, warmth. Staples remain in place. See photo below   Skin:     General: Skin is warm and dry.      Findings: No erythema or rash.   Neurological:      Mental Status: He is alert and oriented to person, place, and time.   Psychiatric:         Mood and Affect: Mood normal.         Behavior: Behavior normal.         Thought Content: Thought content normal.         Judgment: Judgment normal.                  Assessment:       1. Osteomyelitis of right foot, unspecified type    2. S/P above knee amputation, right          Plan:   AKA site healing well without localized signs of infection. Will continue to monitor off antibiotics.  Follow up with Vascular surgery for removal of remaining staples (message sent to vascular surgery dept to schedule appt)  Continue local wound care via home health   Encouraged patient to call for any fevers, chills, sweats or increase in stump pain, swelling, drainage from wound.   The patient understands and agrees with the plan of care. All " questions were answered.   RTC as needed        30 minutes of total time was spent on this encounter, which includes face to face time and non-face to face time preparing to see the patient (eg, review of tests), Obtaining and/or reviewing separately obtained history, documenting clinical information in the electronic or other health record, independently interpreting results (not separately reported) and communicating results to the patient/family/caregiver, or care coordination (not separately reported).

## 2022-02-01 NOTE — TELEPHONE ENCOUNTER
----- Message from Jaylyn Comer sent at 2/1/2022  7:54 AM CST -----  Regarding: Appointment Request    Regarding:  [facility] office called to schedule pt for an appt for today if possible due to pts mobility and transportation. Pt states he can come back on Friday also. Pt is currently in office.       Call back number:  [phone] pts       [place] -[phone] ext

## 2022-02-01 NOTE — TELEPHONE ENCOUNTER
"Spoke with the pt who wanted to come to the clinic to have "6 staples" removed. Attempted to have pt come to clinic today since he was at another appt at [facility] currently.Pt stated he was waiting on a ride.Offered to have him come back tomorrow but pt can't get transportation tomorrow.Pt states he can come Thursday.Pt verbalized understanding of information received.  "

## 2022-02-03 NOTE — PROGRESS NOTES
Patient is status post AKA, presented for removal of remaining 6 staples.    Staples removed without incident.     Incision site appears epithelize, skin edges well coapted, no signs of infection or dehiscence.     Patients questions regarding PT and AKA stump management.     James Matamoros DPM, PGY-1  Podiatry / Foot and Ankle Surgery   Pager # [phone]  Secure chat preferred

## 2022-02-04 NOTE — TELEPHONE ENCOUNTER
Spoke with the pt and informed him that I did speak with his home health nurse per his request to determine if the company have physical therapy.

## 2022-02-07 NOTE — TELEPHONE ENCOUNTER
Spoke with Genie  at [facility] and referral place in the computer.  ----- Message from Genie Arreguin sent at 2/7/2022  1:37 PM CST -----  Regarding: Pt referral needed  Good Afternoon,    Mr. Rock called to schedule his post op physical therapy but I do not see a referral.  Can you please put in [id number] if this patient needs therapy?    Thank

## 2022-02-07 NOTE — TELEPHONE ENCOUNTER
Spoke with the pt and informed him the the referral has been placed for PT eval &treat,stump  Nd prosthesis when needed.Pt also informed that he will be contact by [facility] to start his physical therapy.

## 2022-02-16 NOTE — TELEPHONE ENCOUNTER
----- Message from Ad Reilly sent at 2/15/2022  6:43 PM CST -----  Type:  Patient Returning Call    Who Called: Pt  Who Left Message for Patient: NA  Does the patient know what this is regarding?: New patient looking to be scheduled to treat a diabetic wound.  Would the patient rather a call back or a response via [facility]? Call  Best Call Back Number: [phone]  Additional Information: Please assist, thank you!

## 2022-02-16 NOTE — TELEPHONE ENCOUNTER
Spoke with patient, he wanted to make appointment with Dr Dalton.  Informed him that she is not affiliated with [facility], he will contact her for appt.

## 2022-02-17 NOTE — PATIENT INSTRUCTIONS
Prone hip extension stretch - recommended for 3x daily 30min-1hr at least          DO NOT:  1. Sit in chair/wheel chair for long periods of time  2. Prop amputated leg on a pillow, under hip, or between thighs

## 2022-02-18 PROBLEM — Z89.9 AMPUTEE GAIT: Status: ACTIVE | Noted: 2022-01-01

## 2022-02-18 PROBLEM — R26.89 AMPUTEE GAIT: Status: ACTIVE | Noted: 2022-01-01

## 2022-02-18 PROBLEM — Z74.09 IMPAIRED TRANSFERS: Status: ACTIVE | Noted: 2022-01-01

## 2022-02-21 NOTE — PLAN OF CARE
"[facility]   Physical Therapy Initial Evaluation     Date: 2/17/2022   Name: Nicolas Rock  Clinic Number: [id number]    Therapy Diagnosis:   Encounter Diagnoses   Name Primary?    S/P above knee amputation, right     Amputee gait     Impaired transfers      Physician: Tolu Hawthorne MD    Physician Orders: PT Eval and Treat: Pt needs to also be eval for stump  and prosthesis  Medical Diagnosis from Referral: Z89.611 (ICD-10-CM) - S/P above knee amputation, right  Evaluation Date: 2/17/2022  Authorization Period Expiration: 02/07/2023  Plan of Care Expiration: 4/17/2022  Progress Note Due: 3/17/2022  Visit # / Visits authorized: 1/ 1   FOTO: 0/5    Precautions: Standard, Diabetes and (R. AKA s/p 12/1/2021)     Time In: 4:30 pm  Time Out: 5:20 pm  Total Appointment Time (timed & untimed codes): 50 minutes    DOS: 12/1/2021    SUBJECTIVE     Date of onset: 12/1/2021 (s/p R AKA)    History of current condition - [place] reports: Pt explains that he has diabetes, but that is not what created the need for his R AKA. Pt states he was at work and contracted a bacteria that got into his blood stream and he developed the bone infection (osteomyolitis) which caused him to need to amputation.     Pt states he does not like using the 2WW since it feels like the wheels make him "take off" too easily.    Falls: 2 falls (due to being asleep and needing to go to the bathroom)    Imaging, US lower extremity veins:  Impression:     1. There is a mild amount of subcutaneous edema in the distal aspect of the left lower extremity.  This is consistent with the patient's history.  2. No deep venous thrombosis was visualized.    Prior Therapy: No  Social History: lives with their spouse  Occupation: Supervisor (involved climbing); cut grass (secondary job)  Prior Level of Function: Independent  Current Level of Function: Jude (wheelchair bound currently in preparation for prosthesis)  Any " "stairs/steps in the home: No stairs or step inside the home    Pain: No pain currently    Patients goals: Ambulate safely with prosthesis     Medical History:   Past Medical History:   Diagnosis Date    Chronic diastolic heart failure     Diabetes mellitus     HLD (hyperlipidemia)     Hypertension        Surgical History:   Nicolas Rock  has a past surgical history that includes Colonoscopy (Left, 6/28/2016); Incision and drainage foot (Right, 11/27/2021); and Above-knee amputation (Right, 12/1/2021).    Medications:   Nicolas has a current medication list which includes the following prescription(s): alprazolam, atorvastatin, carvedilol, celecoxib, chlorthalidone, clonidine, ferrous sulfate, furosemide, gabapentin, hydralazine, ibuprofen, insulin detemir u-100, insulin regular, losartan, nifedipine, omeprazole, polyethylene glycol, and senna-docusate 8.6-50 mg.    Allergies:   Review of patient's allergies indicates:   Allergen Reactions    Lisinopril Hives     Angioedema     Contrast media Hives     Per patient report, he received IV contrast while in [place] and states, "I got hives, just like when I eat seafood." Unable to find incident of allergic reaction in patient's hx. Patient did not mention this allergy when specifically asked by RN during triage on 11/27/2021. Severity unknown.    Iodine Hives     Patient reporting "seafood" allergy to RN. Patient is a poor historian and is unable to tell nurse exactly what seafood he is allergic to. Patient did not mention this allergy when specifically asked by RN during triage on 11/27/2021. Severity unknown.          OBJECTIVE     Observation: Pt is a 45 yo M presenting therapy in no apparent distress following R AKA 12/1/2021.     Passive Range of Motion:   Shoulder Left Right   Flexion WFL WFL   Abduction WFL WFL   ER at 0 NT NT   ER at 90 NT NT   IR NT NT      Active Range of Motion:   Shoulder Left Right   Flexion WFL WFL   Abduction WFL WFL   ER " at 0 NT NT   ER at 90 NT NT   IR NT NT     Upper Extremity Strength   (L) UE (R) UE   Shoulder flexion: 5/5 5/5   Shoulder Abduction: 5/5 5/5   Shoulder ER 5/5 5/5   Shoulder IR 5/5 5/5   Elbow Flexion 5/5 5/5   Elbow Extension 5/5 5/5   Rhomboids NT NT       Hip Range of Motion:   Left active Left Passive Right active  Right Passive   Flexion NT NT NT NT   Abduction NT NT NT NT   Extension NT NT NT NT   Ext. Rotation NT NT NT NT   Int. Rotation NT NT NT NT       Lower Extremity Strength  Right LE  Left LE    Knee extension: N/A Knee extension: NT   Knee flexion: N/A Knee flexion: NT   Hip flexion: 4+/5 Hip flexion: 4+/5   Hip Internal Rotation:  NT    Hip Internal Rotation: 4+/5      Hip External Rotation: NT    Hip External Rotation: 4+/5      Hip extension:  4/5 Hip extension: 4/5   Hip abduction: 4+/5 Hip abduction: 4+/5   Hip adduction: 4/5 Hip adduction 4+/5   Ankle dorsiflexion: N/A Ankle dorsiflexion: NT   Ankle plantarflexion: N/A Ankle plantarflexion: NT       Sensation: Grossly intact    Static standing - Pt is able to safely maintain balance with hips in neutral    Gait assessment - Pt is safe with Jude ambulation (RW)     Wheelchair assessment - Pt able to propulse wheelchair Independently      Limitation/Restriction for FOTO Upper Leg Survey    Therapist attempted to review FOTO scores for Nicolas MENA Walker on 2/17/2022.   FOTO documents were not completed, however, will be completed at first visit and entered into EPIC - see Media section.         TREATMENT     Total Treatment time (time-based codes) separate from Evaluation: 0 minutes      Nicolas received the treatments listed below:      therapeutic exercises to develop strength, ROM and core stabilization for 0 minutes including:    Date 2/17/2022   Visit 1/1   POC exp 4/17/2022   PN 3/17/2022   FTF 3/17/2022   FOTO 0/5           UBE        Supine:    HS str (L only)    Hip flexor str (R only)    Hip extension (w towel roll)    TAs    Hip ABD   "  Hip ADD            Prone:    Alt LE              Initials EG         manual therapy techniques: Soft tissue Mobilization were applied to the: R residual limb for 0 minutes, including:  - scar tissue massage  - desensitization techniques (as needed)  - ensure pt has  properly applied to residual limb    neuromuscular re-education activities to improve: Proprioception and Balance for 0 minutes. The following activities were included:  - weightbearing on residual limb 5 x 30"   - standing balance unstable surface    therapeutic activities to improve functional performance for 0  minutes, including:  - sit to stand  - prone hip extension  - bed mobility    gait training to improve functional mobility and safety for 0  minutes, including:  - -300 ft while maintaining hips aligned in neutral position        PATIENT EDUCATION AND HOME EXERCISES     Education provided:   - importance of consistent performance of HEP  - role of PT/PTA      Written Home Exercises Provided: yes. Exercises were reviewed and Nicolas was able to demonstrate them prior to the end of the session.  Nicolas demonstrated good  understanding of the education provided. See EMR under Patient Instructions for exercises provided during therapy sessions.    ASSESSMENT     Prosthetist educates pt on proper wear of  and placement all the way into the groin to avoid "dog ears" into the skin. Waist wrap placed. Prosthetist recommends  wear 24/7. Prosthetist offers to return for another visit to continue to prepare pt for prosthesis. Explanation provided regarding further along in the process such as there being a variety of knees and feet available with different prostheses. Wound assessed and appears to be healed sufficiently and rounded. Prosthetist explains to pt that there are some minor wounds along the HS, and pt explains this is a result of 6 stitches that were recently removed following his procedure. Pt expresses that he " will continue to monitor these wounds to ensure that they heal properly.    Nicolas is a 46 y.o. male referred to outpatient Physical Therapy with a medical diagnosis of s/p R above knee amputation. Patient presents to therapy Jude using a wheelchair and able to safely navigate through narrow spaces. Nicolas's BUE and BLE strength assessed in addition to ROM, transfers, and gait. Pt has sufficient movement (strength and ROM), gait, and generalized functional movement in BUE, LLE, as well as R residual limb to move forward with preparation for prosthesis. He is able to perform gait and transfer with SBA. His R residual limb appears to be well rounded however displays some edema. Prosthetist fit pt for  at this visit, with plans to follow up at pt home for any additional information that needs to be collected. PT will aim to continue to encourage proper 24/7 wear of his , increase tolerance for weightbearing on residual limb, maintaining locus of control, discourage contractor formation, as well as prepare pt for ability to lift and maneuver R prosthesis with continued strengthening activities to maintain and improve hip strength. PT explains to pt that he should not be lying on his back with residual limb propped up for sleeping as this will encourage the formation of a contracture. Pt explains that he fell twice when he awoke from sleep and tried to get up to use the restroom, and it is emphasized by Prosthetist as well as PT that pt will need to be very careful to avoid any additional falls.    Patient prognosis is Excellent.   Patient will benefit from skilled outpatient Physical Therapy to address the deficits stated above and in the chart below, provide patient /family education, and to maximize patient's level of independence.     Plan of care discussed with patient: Yes  Patient's spiritual, cultural and educational needs considered and patient is agreeable to the plan of care and goals as  stated below:     Anticipated Barriers for therapy: None    Medical Necessity is demonstrated by the following  History  Co-morbidities and personal factors that may impact the plan of care Co-morbidities:   depression, diabetes and chronic diastolic heart failure    Personal Factors:   no deficits     moderate   Examination  Body Structures and Functions, activity limitations and participation restrictions that may impact the plan of care Body Regions:   lower extremities  upper extremities  trunk    Body Systems:    ROM  Strength  Gait  Transfers  Transitions    Participation Restrictions:   None    Activity limitations:   Learning and applying knowledge  no deficits    General Tasks and Commands  no deficits    Communication  no deficits    Mobility  lifting and carrying objects  moving around using equipment (WC)  using transportation (bus, train, plane, car)  driving (bike, car, motorcycle)    Self care  no deficits    Domestic Life  shopping  cooking  doing house work (cleaning house, washing dishes, laundry)    Interactions/Relationships  no deficits    Life Areas  no deficits    Community and Social Life  community life  recreation and leisure         high   Clinical Presentation evolving clinical presentation with changing clinical characteristics high   Decision Making/ Complexity Score: high     Goals:  Short Term Goals: 4 weeks   1. This patient will be independent with a basic HEP. In progress, not met  2. This patient will increase R residual limb tolerance to weight bearing for at least 20 continuous minutes to be able to tolerate wear of his prosthesis. In progress, not met  3. This patient will increase LLE and R residual limb strength to 5/5 in order to be able to perform work responsibilities when fitted with his prosthesis as well as usual household duties. In progress, not met     Long Term Goals 8 weeks   1. This patient will be independent with an updated HEP. In progress, not met  2. This  patient will have a normalized gait pattern with hips remaining neutral in order to properly maintain locus of control and ambulate with even weight distribution on bilateral extremities with prosthesis. In progress, not met  4. This patient will increase R residual limb tolerance to weight bearing for at least 45 continuous minutes to be able to tolerate wear of his prosthesis. In progress, not met    PLAN   Plan of care Certification: 2/17/2022 to 4/17/2022.    Outpatient Physical Therapy 2 times weekly for 8 weeks to include the following interventions: Gait Training, Manual Therapy, Moist Heat/ Ice, Neuromuscular Re-ed, Patient Education, Therapeutic Activities and Therapeutic Exercise.     Patito Steen, PT      I CERTIFY THE NEED FOR THESE SERVICES FURNISHED UNDER THIS PLAN OF TREATMENT AND WHILE UNDER MY CARE   Physician's comments:     Physician's Signature: ___________________________________________________

## 2022-02-25 NOTE — PROGRESS NOTES
Physical Therapy Daily Treatment Note     Name: Nicolas Rock  Clinic Number: [id number]    Therapy Diagnosis:   Encounter Diagnoses   Name Primary?    Amputee gait Yes    S/P above knee amputation, right     Impaired transfers      Physician: Tolu Hawthorne MD    Visit Date: 2/25/2022  Physician Orders: PT Eval and Treat: Pt needs to also be eval for stump  and prosthesis  Medical Diagnosis from Referral: Z89.611 (ICD-10-CM) - S/P above knee amputation, right  Evaluation Date: 2/17/2022  Authorization Period Expiration: 02/07/2023  Plan of Care Expiration: 4/17/2022  Progress Note Due: 3/17/2022  Visit # / Visits authorized: 1/ 1   FOTO: 1/5      Time In: 10:15  Time Out: 11:00  Total Billable Time: 45 minutes    Precautions: Standard, Diabetes and (R. AKA s/p 12/1/2021)       Subjective     Pt reports: He isn't wearing his  today because his water was off when he tried to wash it last night. The water still isn't back on yet.  He was compliant with home exercise program.  Response to previous treatment: Feels like the inside of the hip is tightening up (in a good way)  Functional change: No change    Pain: 0/10  Location:  NA     Objective         Treatment     therapeutic exercises to develop strength, ROM and core stabilization for 40 minutes including:     Date 2/25/2022 2/17/2022   Visit 2/20 1/1   POC exp 4/17/2022 4/17/2022   PN 3/17/2022 3/17/2022   FTF 3/17/2022 3/17/2022   FOTO 1/5 0/5                 UBE       Nustep 10' L3      Supine:      HS str (L only)      Hip flexor str (R only)  Prone, R residual limb on table, L leg on ground    5 x 30s    Hip extension (w towel roll)      TAs      Hip ABD Side lying  3 x 10  With 3#     Hip ADD Side lying  3 x 10                   Prone:      Alt LE      Hip extension 3 x 10         Seated:     LAQ (L)      HS curl (R)                              Initials KB EG            manual therapy techniques: Soft tissue Mobilization were applied  "to the: R residual limb for 0 minutes, including:  - scar tissue massage  - desensitization techniques (as needed)  - ensure pt has  properly applied to residual limb     neuromuscular re-education activities to improve: Proprioception and Balance for 0 minutes. The following activities were included:  - weightbearing on residual limb 5 x 30"   - standing balance unstable surface     therapeutic activities to improve functional performance for 0  minutes, including:  - sit to stand  - prone hip extension  - bed mobility     gait training to improve functional mobility and safety for 0  minutes, including:  - -300 ft while maintaining hips aligned in neutral position         Home Exercises Provided and Patient Education Provided     Education provided:   - Importance of compliance with  at this time. He verbalized that he will put it back on as soon as possible.    Written Home Exercises Provided: Patient instructed to cont prior HEP.  Exercises were reviewed and Nicolas was able to demonstrate them prior to the end of the session.  Nicolas demonstrated good  understanding of the education provided.     See EMR under Patient Instructions for exercises provided prior visit.    Assessment     Patient tolerated introduction of hip stretching and strengthening. Encouraged patient to continue to wear  24 hours a day except to wash.  Nicolas Is progressing well towards his goals.   Pt prognosis is Good.     Pt will continue to benefit from skilled outpatient physical therapy to address the deficits listed in the problem list box on initial evaluation, provide pt/family education and to maximize pt's level of independence in the home and community environment.     Pt's spiritual, cultural and educational needs considered and pt agreeable to plan of care and goals.    Anticipated barriers to physical therapy: Compliance to     Goals:   Short Term Goals: 4 weeks   1. This patient will be " independent with a basic HEP. In progress, not met  2. This patient will increase R residual limb tolerance to weight bearing for at least 20 continuous minutes to be able to tolerate wear of his prosthesis. In progress, not met  3. This patient will increase LLE and R residual limb strength to 5/5 in order to be able to perform work responsibilities when fitted with his prosthesis as well as usual household duties. In progress, not met     Long Term Goals 8 weeks   1. This patient will be independent with an updated HEP. In progress, not met  2. This patient will have a normalized gait pattern with hips remaining neutral in order to properly maintain locus of control and ambulate with even weight distribution on bilateral extremities with prosthesis. In progress, not met  4. This patient will increase R residual limb tolerance to weight bearing for at least 45 continuous minutes to be able to tolerate wear of his prosthesis. In progress, not met      Plan     Patient would like to weigh himself and try out standard walker next visit    Continue to progress per plan of care      Ag Wong, PT

## 2022-02-28 NOTE — PROGRESS NOTES
Physical Therapy Daily Treatment Note     Name: Nicolas Rock  Clinic Number: [id number]    Therapy Diagnosis:   Encounter Diagnoses   Name Primary?    Amputee gait Yes    S/P above knee amputation, right     Impaired transfers      Physician: Tolu Hawthorne MD    Visit Date: 2/28/2022  Physician Orders: PT Eval and Treat: Pt needs to also be eval for stump  and prosthesis  Medical Diagnosis from Referral: Z89.611 (ICD-10-CM) - S/P above knee amputation, right  Evaluation Date: 2/17/2022  Authorization Period Expiration: 02/07/2023  Plan of Care Expiration: 4/17/2022  Progress Note Due: 3/17/2022  Visit # / Visits authorized: 1/ 1   FOTO: 1/5      Time In: 3:30  Time Out: 4:15  Total Billable Time: 45 minutes    Precautions: Standard, Diabetes and (R. AKA s/p 12/1/2021)       Subjective     Pt reports: He was able to put his  back on. It feels like it is fitting well.  He was compliant with home exercise program.  Response to previous treatment: Feels like the inside of the hip is tightening up (in a good way)  Functional change: No change    Pain: 0/10  Location:  NA     Objective         Treatment     therapeutic exercises to develop strength, ROM and core stabilization for 40 minutes including:     Date 2/28/22 2/25/2022 2/17/2022   Visit 3/20 2/20 1/1   POC exp 4/17/2022 4/17/2022 4/17/2022   PN 3/17/2022 3/17/2022 3/17/2022   FTF 3/17/2022 3/17/2022 3/17/2022   FOTO 2/5 1/5 0/5                   UBE        Nustep 8' L3  10' L3      Supine:       HS str (L only)       Hip flexor str (R only) Prone, R residual limb on table, L leg on ground    5 x 30s  Prone, R residual limb on table, L leg on ground    5 x 30s    Hip extension (w towel roll)       TAs       Hip ABD Side lying  3 x 10  With 3# Side lying  3 x 10  With 3#     Hip ADD  Side lying  3 x 10                     Prone:       Alt LE       Hip extension 3 x 10 3 x 10          Seated:      LAQ (L)       HS curl (R)            "  Standing      Hip hike L side  3 x 10               Initials  KB  KB EG            manual therapy techniques: Soft tissue Mobilization were applied to the: R residual limb for 0 minutes, including:  - scar tissue massage  - desensitization techniques (as needed)  - ensure pt has  properly applied to residual limb     neuromuscular re-education activities to improve: Proprioception and Balance for 0 minutes. The following activities were included:  - weightbearing on residual limb 5 x 30"   - standing balance unstable surface     therapeutic activities to improve functional performance for 0  minutes, including:  - sit to stand  - prone hip extension  - bed mobility     gait training to improve functional mobility and safety for 0  minutes, including:  - -300 ft while maintaining hips aligned in neutral position         Home Exercises Provided and Patient Education Provided     Education provided:   - Importance of compliance with  at this time. He verbalized that he will put it back on as soon as possible.    Written Home Exercises Provided: Patient instructed to cont prior HEP.  Exercises were reviewed and Nicolas was able to demonstrate them prior to the end of the session.  Nicolas demonstrated good  understanding of the education provided.     See EMR under Patient Instructions for exercises provided prior visit.    Assessment     Patient is tolerating exercises well. Patient stood temporarily with RW so we could try to assess his weight, but he seems unstable on RW due to putting his full body weight into it. I educated him reguarding the benefit of standard walker vs. Rolling walker.    Nicolas Is progressing well towards his goals.   Pt prognosis is Good.     Pt will continue to benefit from skilled outpatient physical therapy to address the deficits listed in the problem list box on initial evaluation, provide pt/family education and to maximize pt's level of independence in the home " and community environment.     Pt's spiritual, cultural and educational needs considered and pt agreeable to plan of care and goals.    Anticipated barriers to physical therapy: Compliance to     Goals:   Short Term Goals: 4 weeks   1. This patient will be independent with a basic HEP. In progress, not met  2. This patient will increase R residual limb tolerance to weight bearing for at least 20 continuous minutes to be able to tolerate wear of his prosthesis. In progress, not met  3. This patient will increase LLE and R residual limb strength to 5/5 in order to be able to perform work responsibilities when fitted with his prosthesis as well as usual household duties. In progress, not met     Long Term Goals 8 weeks   1. This patient will be independent with an updated HEP. In progress, not met  2. This patient will have a normalized gait pattern with hips remaining neutral in order to properly maintain locus of control and ambulate with even weight distribution on bilateral extremities with prosthesis. In progress, not met  4. This patient will increase R residual limb tolerance to weight bearing for at least 45 continuous minutes to be able to tolerate wear of his prosthesis. In progress, not met      Plan       Continue to progress per plan of care. Patient should see about getting standard walker      Ag Wong, PT

## 2022-03-03 NOTE — ED PROVIDER NOTES
"Encounter Date: 3/3/2022       History     Chief Complaint   Patient presents with    Shortness of Breath     Pt arrives via EMS c/o SOB and swelling to left leg. O2 sat 89%. Now 95% on 2L NC.     46-year-old male with history of chronic diastolic dysfunction, diabetes, recent right above-the-knee amputation, IV drug use presents with gradual onset of shortness of breath which began yesterday.  He has had similar problems like this when he had congestive heart failure.  Denies infectious symptoms such as fever cough or sputum production.  Denies hemoptysis.  He denies any chest discomfort or chest pain.  Is routine care paramedics noted that he had swelling to his leg but it patient denies any change in his swelling.  Paramedics found him with a room air pulse ox of 89% and transported him here.  Patient states that he had stopped his diuretic and restarted it tonight.        Review of patient's allergies indicates:   Allergen Reactions    Lisinopril Hives     Angioedema     Contrast media Hives     Per patient report, he received IV contrast while in [place] and states, "I got hives, just like when I eat seafood." Unable to find incident of allergic reaction in patient's hx. Patient did not mention this allergy when specifically asked by RN during triage on 11/27/2021. Severity unknown.    Iodine Hives     Patient reporting "seafood" allergy to RN. Patient is a poor historian and is unable to tell nurse exactly what seafood he is allergic to. Patient did not mention this allergy when specifically asked by RN during triage on 11/27/2021. Severity unknown.     Past Medical History:   Diagnosis Date    Chronic diastolic heart failure     Diabetes mellitus     HLD (hyperlipidemia)     Hypertension      Past Surgical History:   Procedure Laterality Date    ABOVE-KNEE AMPUTATION Right 12/1/2021    Procedure: AMPUTATION, ABOVE KNEE;  Surgeon: Tolu Hawthorne MD;  Location: [facility] OR [address] [place] [address];  Service: " Peripheral Vascular;  Laterality: Right;    COLONOSCOPY Left 6/28/2016    Procedure: COLONOSCOPY;  Surgeon: ONEYDA Mckeon MD;  Location: [facility];  Service: Endoscopy;  Laterality: Left;  prostate exam      INCISION AND DRAINAGE FOOT Right 11/27/2021    Procedure: INCISION AND DRAINAGE, FOOT WITH BONE BIOPSY;  Surgeon: Aranza Saenz DPM;  Location: [facility] OR [address] [place] [address];  Service: Podiatry;  Laterality: Right;     Family History   Problem Relation Age of Onset    Hypertension Mother     Diabetes Mother     Kidney disease Mother     Cancer Maternal Uncle     Heart attack Maternal Grandfather      Social History     Tobacco Use    Smoking status: Current Some Day Smoker     Packs/day: 0.50     Years: 33.00     Pack years: 16.50     Types: Cigarettes     Start date: 1987    Smokeless tobacco: Never Used    Tobacco comment:  Pt is enrolled in the [employer]. Ambulatory referral to Smoking Cessation program. Currently, chews Nicorette gum.   Substance Use Topics    Alcohol use: No    Drug use: Yes     Types: Cocaine     Comment: Heroin Daily: Used this morning 10/8/21     Review of Systems   Constitutional: Negative for chills and fever.   HENT: Negative for nosebleeds.    Eyes: Negative for visual disturbance.   Respiratory: Positive for shortness of breath.    Cardiovascular: Negative for chest pain.   Gastrointestinal: Negative for vomiting.   Genitourinary: Negative for frequency.   Musculoskeletal: Negative for joint swelling.   Skin: Negative for rash.   Neurological: Negative for numbness.   Hematological: Does not bruise/bleed easily.   Psychiatric/Behavioral: Negative for confusion.       Physical Exam     Initial Vitals   BP Pulse Resp Temp SpO2   03/03/22 0355 03/03/22 0355 03/03/22 0355 03/03/22 0529 03/03/22 0355   (!) 187/88 101 (!) 22 97.9 °F (36.6 °C) 95 %      MAP       --                Physical Exam    Constitutional: He appears well-developed and well-nourished. He is not diaphoretic.  No distress.   HENT:   Head: Normocephalic.   Eyes: Conjunctivae are normal. Pupils are equal, round, and reactive to light.   Neck: Neck supple. No thyromegaly present.   Normal range of motion.  Cardiovascular: Normal rate, regular rhythm and normal heart sounds.   No murmur heard.  Pulmonary/Chest: No respiratory distress. He has no wheezes. He has no rhonchi. He has rales.   Abdominal: Abdomen is soft. Bowel sounds are normal. He exhibits no distension. There is no abdominal tenderness.   Musculoskeletal:      Cervical back: Normal range of motion and neck supple.      Comments: Right AKA present.  Mild left thigh edema.  Calf is wrapped in bandages.     Neurological: He is alert. He has normal strength. No sensory deficit. GCS score is 15. GCS eye subscore is 4. GCS verbal subscore is 5. GCS motor subscore is 6.   Psychiatric: He has a normal mood and affect.         ED Course   Procedures  Labs Reviewed   CBC W/ AUTO DIFFERENTIAL - Abnormal; Notable for the following components:       Result Value    RBC 3.83 (*)     Hemoglobin 11.1 (*)     Hematocrit 32.7 (*)     MPV 9.1 (*)     Mono # 1.2 (*)     Eos # 1.4 (*)     Lymph % 17.5 (*)     Eosinophil % 11.6 (*)     All other components within normal limits    Narrative:     Release to patient->Immediate   COMPREHENSIVE METABOLIC PANEL - Abnormal; Notable for the following components:    Sodium 131 (*)     CO2 20 (*)     BUN 31 (*)     Total Protein 9.2 (*)     Albumin 2.9 (*)     Alkaline Phosphatase 137 (*)     All other components within normal limits    Narrative:     Release to patient->Immediate   TROPONIN I - Abnormal; Notable for the following components:    Troponin I 0.045 (*)     All other components within normal limits    Narrative:     Release to patient->Immediate   B-TYPE NATRIURETIC PEPTIDE - Abnormal; Notable for the following components:     (*)     All other components within normal limits    Narrative:     Release to patient->Immediate  "  HEPATITIS C ANTIBODY   SARS-COV-2 RDRP GENE          Imaging Results          X-Ray Chest AP Portable (Final result)  Result time 03/03/22 04:29:28    Final result by Macario Ku MD (03/03/22 04:29:28)                 Impression:      Cardiomegaly and suspected small left pleural effusion with adjacent passive atelectasis or airspace disease in the left lower lung zone.      Electronically signed by: Macario Ku MD  Date:    03/03/2022  Time:    04:29             Narrative:    EXAMINATION:  XR CHEST AP PORTABLE    CLINICAL HISTORY:  Provided history is "dyspnea;  ".    TECHNIQUE:  One view of the chest.    COMPARISON:  11/03/2021.    FINDINGS:  Patient is mildly rotated.  Lung volumes are low.  Cardiomediastinal silhouette is enlarged and similar to the prior study.  There is central vascular congestion and coarsened perihilar interstitial lung markings.  Increased interstitial attenuation in the left lower lung zone with blunting of the left costophrenic angle, likely related to small left pleural effusion.  No sizable right pleural effusion.  Upper lung fields are relatively clear.  No distinct pneumothorax.                                 Medications - No data to display  Medical Decision Making:   Initial Assessment:   Patient with shortness of breath.  He has mild hypoxia.  He has some findings of CHF.  Chest x-ray has some central venous congestion and a very small left pleural effusion.  EKG shows type sinus tachycardia without ischemic changes.  Patient stated after his initial evaluation but before labs were back that he would like to be discharged.  He states that he thinks that he just needs to restart his Lasix and does not want to be here anymore.  He would not wait for blood work to return.  He states that he understands that he is taking a risk with his life and that he also understands that we do not have a diagnosis for him.  I tried to convince him otherwise but he insisted on being " released against medical advice.  He states he has a diuretic at home and does not need any prescriptions.                      Clinical Impression:   Final diagnoses:  [R06.02] Shortness of breath          ED Disposition Condition    ROMAN Wharton MD  03/03/22 0582

## 2022-03-03 NOTE — DISCHARGE INSTRUCTIONS
Please understand you are leaving against medical advice.  We do not have a diagnosis to account for shortness of breath and low oxygen level.  Please restart your diuretic Lasix.  Please understand he may return any time.

## 2022-03-04 NOTE — TELEPHONE ENCOUNTER
LOUIS Camp ordered that patient be scheduled for a hep c consult visit.  I spoke with patient.  Appt with PA Scheuermann scheduled 4/1/22; appt reminder notice mailed.

## 2022-03-07 NOTE — CODE/ RAPID DOCUMENTATION
ACLS measured stopped per MD order. Pt remains pulseless and apenic. Time of death 16:44 called by Dr Howell and Dr RAJESH Dawn.

## 2022-03-07 NOTE — PROGRESS NOTES
HEPATOLOGY CLINIC VISIT NOTE - HCV clinic  REFERRING PROVIDER: Germaine Rousseau NP  CHIEF COMPLAINT: Hepatitis C       HISTORY     This is a 46 y.o. Black or  male with chronic hepatitis C, here for further eval / mngmt.     Reports 3 recent episodes of feeling weak and short of breath and like he may pass out. They've all occurred when he was transferring to/from wheelchair. Most recent was yesterday. Overall has had decreased appetite and feeling weak.  At present, only symptom is weakness. BS in office is 280. No CP or dyspnea at present.  Towards end of visit also notes he's had recent panic attacks.       HCV history:  Recently diagnosed  Risks for HCV:  Prior IV / IN drug use - first use age 23    Tattoos - first one age 16  - Treatment naive  - Genotype ?  - HCV ,000 IU/mL - 11/2021      Liver staging:  No formal liver staging  No liver imaging  Labs and imaging reveal no obvious evidence of advanced fibrosis      Denies jaundice, dark urine, abdominal distention, hematemesis, melena, slowed mentation.   No abnormal skin rashes.        PMH, PSH, PROBLEM LIST, SOCIAL HX, FAMILY HX, MEDS, ALLERGIES   Reviewed in Epic  Pertinent findings:  FAMILY HX: neg for liver diease  Alcohol - denies  Drugs - (+) IVDA      ROS:   Review of Systems   Constitutional: Positive for malaise/fatigue. Negative for fever.        Weakness   Respiratory: Positive for shortness of breath (when transferring to/from wheelchair).    Cardiovascular: Negative for chest pain, palpitations and leg swelling.   Gastrointestinal: Negative for abdominal pain and melena.   Psychiatric/Behavioral: Negative for memory loss.       PHYSICAL EXAM:  Friendly Black or  male, in no acute distress; alert and oriented to person, place and time  VITALS: reviewed  HEENT: Sclerae anicteric.   NECK: Supple  CVS: Regular rhythm. No murmurs  LUNGS: Normal respiratory effort. Clear bilaterally  ABDOMEN: Examined in wheelchair. No  abd distention. Nontender.     SKIN: Warm and dry. No jaundice, No obvious rashes.   EXTREMITIES: right AKA. Left lower leg wrapped in ace bandage  NEURO/PSYCH: in wheelchair. Memory intact. Thought and speech pattern appropriate. Behavior normal. No depression or anxiety noted.    PERTINENT DIAGNOSTIC RESULTS     Lab Results   Component Value Date    WBC 12.28 03/03/2022    HGB 11.1 (L) 03/03/2022     03/03/2022     Lab Results   Component Value Date    INR 1.2 11/27/2021     Lab Results   Component Value Date    AST 26 03/03/2022    ALT 20 03/03/2022    BILITOT 0.5 03/03/2022    ALBUMIN 2.9 (L) 03/03/2022    ALKPHOS 137 (H) 03/03/2022    CREATININE 1.1 03/03/2022    BUN 31 (H) 03/03/2022     (L) 03/03/2022    K 4.8 03/03/2022         ASSESSMENT     46 y.o. Black or  male with:  1. CHRONIC HEPATITIS C, GENOTYPE ? - treatment naive  -- Unknown Immunity to HAV & HBV      PLAN     1. Labs,FibroScan, U/S   2. f/u visit  Goal of antiviral therapy    Orders Placed This Encounter   Procedures    FibroScan (Vibration Controlled Transient Elastography)    US Abdomen Complete    Hepatitis B Surface Antigen    Hepatitis B Surface Ab, Qualitative    Hepatitis B Core Antibody, Total    Hepatitis A antibody, IgG         ___________________________________________________________________  EDUCATION:  The natural history of Hepatitis C, including potential progression to cirrhosis was reviewed. We discussed the increased progression of liver disease secondary to alcohol use; patient was advised to avoid alcohol completely.     Transmission of Hepatitis C was reviewed, including possible sexual transmission. Sexual contacts should be screened. Patient should avoid sharing personal products such as razors, toothbrushes, etc.     Recommend avoiding raw seafood.  Limit acetaminophen to 2000mg daily.  __________________________________________________________________    Duration of encounter: 30  MIN  This includes face-to-face time and non face-to-face time preparing to see the patient (eg, review of tests), obtaining and/or reviewing separately obtained history, documenting clinical information in the electronic or other health record, independently interpreting resultsand communicating results to the patient/family/caregiver, or care coordination.

## 2022-03-07 NOTE — ED NOTES
Pt remains on toilet.  States he is fine.  States he wants to stay in bathroom for now.  Instructed to pull emergency cord if needed.

## 2022-03-07 NOTE — ED TRIAGE NOTES
Patient was in in bathroom and found by GEOVANNY Lima with decreased LOC. Patient is non verbal with eyes open. MD at bedside evaluating patient. No pulse noted, CPR started

## 2022-03-07 NOTE — CODE/ RAPID DOCUMENTATION
Bedside ultrasound performed by MD with cardiac stand still noted. Compressions resumed at this time per MD order.   CVA

## 2022-03-07 NOTE — ED NOTES
Pt sitting in wheelchair, knocked on bathroom door for assistance out.  Attempted to push pt to waiting room, pt states he feels like he might pass out.  Pt began to have decreased LOC with slight seizure like activity to upper body.  Called for help, transported pt back to main ED.  Pt was diaphoretic and unresponsive.  Pulse was present.  Pt moved to room 13 and placed on cardiac monitor.  Dr. Dawn and Dr. Howell at bedside.

## 2022-03-07 NOTE — ED PROVIDER NOTES
"Encounter date: 4:58 PM 03/07/2022    Source of History   Patient    Chief Complaint   Pt presents with:   Shortness of Breath (Getting tx for hep c , dizzy)      History Of Present Illness   Nicolas Rock is a 46 y.o. male with History of IV drug use, HTN, diabetes, CHF, right above-the-knee amputation who presents to the ED with chief complaint of shortness of breath and dizziness.  History limited due to critical condition of patient.  Patient was triaged in the ED and he had an EKG performed which showed no STEMI.  He was using the restroom and became short of breath and stated he was dizzy.  He was immediately brought in to the department and placed in room 13. He was noted to be afebrile in bradycardic and then lost pulses.  CPR was initiated.      History limited due to critical condition of patient    Review Of Systems   As per HPI and below:  Unable to obtain due to critical condition of patient      Review of patient's allergies indicates:   Allergen Reactions    Lisinopril Hives     Angioedema     Contrast media Hives     Per patient report, he received IV contrast while in [place] and states, "I got hives, just like when I eat seafood." Unable to find incident of allergic reaction in patient's hx. Patient did not mention this allergy when specifically asked by RN during triage on 11/27/2021. Severity unknown.    Iodine Hives     Patient reporting "seafood" allergy to RN. Patient is a poor historian and is unable to tell nurse exactly what seafood he is allergic to. Patient did not mention this allergy when specifically asked by RN during triage on 11/27/2021. Severity unknown.       No current facility-administered medications on file prior to encounter.     Current Outpatient Medications on File Prior to Encounter   Medication Sig Dispense Refill    ALPRAZolam (XANAX) 2 MG Tab Take 2 mg by mouth once daily.      atorvastatin (LIPITOR) 40 MG tablet Take 1 tablet (40 mg total) by mouth once " daily. 90 tablet 3    carvediloL (COREG) 6.25 MG tablet Take 1 tablet (6.25 mg total) by mouth 2 (two) times daily. 60 tablet 11    celecoxib (CELEBREX) 200 MG capsule Take 1 capsule (200 mg total) by mouth once daily. (Patient not taking: No sig reported) 7 capsule 0    chlorthalidone (HYGROTEN) 50 MG Tab Take 2 tablets (100 mg total) by mouth every evening. 60 tablet 11    cloNIDine (CATAPRES) 0.3 MG tablet Take 1 tablet (0.3 mg total) by mouth 3 (three) times daily. 90 tablet 1    ferrous sulfate (FEOSOL) 325 mg (65 mg iron) Tab tablet Take 1 tablet (325 mg total) by mouth every other day. 15 tablet 1    furosemide (LASIX) 40 MG tablet Take 1 tablet (40 mg total) by mouth once daily. 5 tablet 0    gabapentin (NEURONTIN) 300 MG capsule 300 mg 2 (two) times daily.       hydrALAZINE (APRESOLINE) 100 MG tablet Take 1 tablet (100 mg total) by mouth every 8 (eight) hours. 90 tablet 0    ibuprofen (ADVIL,MOTRIN) 800 MG tablet Take 800 mg by mouth 2 (two) times daily as needed.      insulin detemir U-100 (LEVEMIR) 100 unit/mL injection Inject 10 Units into the skin every evening.       insulin regular 100 unit/mL Inj injection  3 times a day by injection route before meals. ( sliding scale)      losartan (COZAAR) 100 MG tablet Take 1 tablet (100 mg total) by mouth once daily. 30 tablet 0    NIFEdipine (PROCARDIA-XL) 60 MG (OSM) 24 hr tablet Take 2 tablets (120 mg total) by mouth once daily. 60 tablet 11    omeprazole (PRILOSEC) 20 MG capsule Take 20 mg by mouth every evening.      polyethylene glycol (GLYCOLAX) 17 gram/dose powder Take 17 g by mouth once daily. (Patient not taking: No sig reported) 1 Bottle 0    promethazine (PHENERGAN) 6.25 mg/5 mL syrup TAKE 1 TEASPOONFUL BY MOUTH THREE TIMES DAILY      senna-docusate 8.6-50 mg (PERICOLACE) 8.6-50 mg per tablet Take 2 tablets by mouth 2 (two) times daily. (Patient not taking: No sig reported) 14 tablet 0       Past History   As per HPI and below:  Past  Medical History:   Diagnosis Date    Chronic diastolic heart failure     Chronic hepatitis C     Diabetes mellitus     HLD (hyperlipidemia)     Hypertension      Past Surgical History:   Procedure Laterality Date    ABOVE-KNEE AMPUTATION Right 12/1/2021    Procedure: AMPUTATION, ABOVE KNEE;  Surgeon: Tolu Hawthorne MD;  Location: [facility] OR [address] [place] [address];  Service: Peripheral Vascular;  Laterality: Right;    COLONOSCOPY Left 6/28/2016    Procedure: COLONOSCOPY;  Surgeon: ONEYDA Mckeon MD;  Location: [facility] ENDO;  Service: Endoscopy;  Laterality: Left;  prostate exam      INCISION AND DRAINAGE FOOT Right 11/27/2021    Procedure: INCISION AND DRAINAGE, FOOT WITH BONE BIOPSY;  Surgeon: Aranza Saenz DPM;  Location: [facility] OR [address] [place] [address];  Service: Podiatry;  Laterality: Right;       Social History     Socioeconomic History    Marital status:    Tobacco Use    Smoking status: Current Some Day Smoker     Packs/day: 0.50     Years: 33.00     Pack years: 16.50     Types: Cigarettes     Start date: 1987    Smokeless tobacco: Never Used    Tobacco comment:  Pt is enrolled in the [employer]. Ambulatory referral to Smoking Cessation program. Currently, chews Nicorette gum.   Substance and Sexual Activity    Alcohol use: No    Drug use: Yes     Types: Cocaine     Comment: Heroin Daily: Used this morning 10/8/21    Sexual activity: Yes     Partners: Female       Family History   Problem Relation Age of Onset    Hypertension Mother     Diabetes Mother     Kidney disease Mother     Cancer Maternal Uncle     Heart attack Maternal Grandfather        Physical Exam     Vitals:    03/07/22 1350   BP: (!) 152/100   Pulse: 109   Resp: 18   Temp: 98.5 °F (36.9 °C)   TempSrc: Oral   SpO2: 97%   Weight: 104.3 kg (230 lb)   Height: 6' (1.829 m)     Physical Exam:   Nursing note and vitals reviewed.  Appearance:  Nontoxic adult male in acute respiratory distress.  Making purposeful movements.    Skin: No rashes seen.     Eyes: No conjunctival injection.  Pupils 2 mm bilaterally and nonreactive to light.  Head: NC/AT  ENT: Oropharynx clear.    Chest: CTAB.  Slow deep labored breathing.  Cardiovascular:  Bradycardic and regular.  Normal equal bilateral radial pulses.  Abdomen: Soft.  Not distended.  Nontender.  No guarding.  No rebound. No Masses  Musculoskeletal: Good range of motion all joints.  Right above-the-knee amputation.   Neurologic: Moves all extremities.    Mental Status:  Unable to assess.      Results and Medications    Intubation    Date/Time: 3/7/2022 5:42 PM  Location procedure was performed: [facility] EMERGENCY DEPARTMENT  Performed by: Ricki Howell MD  Authorized by: Vj Dawn MD   Consent Done: Emergent Situation  Indications: respiratory distress  Intubation method: WILL/LMA    IO Line - Interosseous Needle/Catheter    Date/Time: 3/7/2022 5:45 PM  Location procedure was performed: [facility] EMERGENCY DEPARTMENT  Performed by: Ricki Howell MD  Authorized by: Vj Dawn MD   Consent Done: Emergent Situation  Indications: clinical deterioration  Insertion site: left proximal humerous  Number of attempts: 1  Confirmation method: stability of the needle  Patient tolerance: Patient tolerated the procedure well with no immediate complications        Labs Reviewed   POCT GLUCOSE - Abnormal; Notable for the following components:       Result Value    POCT Glucose 255 (*)     All other components within normal limits   CBC W/ AUTO DIFFERENTIAL   COMPREHENSIVE METABOLIC PANEL   TROPONIN I   B-TYPE NATRIURETIC PEPTIDE   TROPONIN I       Imaging Results          X-Ray Chest AP Portable (Final result)  Result time 03/07/22 15:32:42    Final result by Brittani Celaya MD (03/07/22 15:32:42)                 Impression:      Stable cardiomegaly with probable small left pleural effusion, not detrimental changed compared to prior exam.      Electronically signed by: Brittani Celaya  MD  Date:    03/07/2022  Time:    15:32             Narrative:    EXAMINATION:  XR CHEST AP PORTABLE    CLINICAL HISTORY:  Chest Pain;    TECHNIQUE:  Single frontal view of the chest was performed.    COMPARISON:  March 3, 2022    FINDINGS:  The heart size is enlarged to a similar extent as was seen on the previous exam.  There is some mild blunting at the lateral aspect of the left costophrenic angle, likely relating to a small amount of pleural fluid.  There is degenerative change within the spine.  The lung fields appear clear without focal consolidation.                                    Medications   naloxone injection 2 mg (2 mg local intranasal application Given 3/7/22 1620)   naloxone injection 2 mg (2 mg Intravenous Given 3/7/22 1626)   EPINEPHrine 0.1 mg/mL injection (1 mg Intravenous Given 3/7/22 1626)   calcium chloride 100 mg/mL (10 %) injection (1 g Intravenous Given 3/7/22 1626)   sodium bicarbonate 8.4 % (1 mEq/mL) injection (50 mEq Intravenous Given 3/7/22 1626)   EPINEPHrine 0.1 mg/mL injection (1 mg Intravenous Given 3/7/22 1641)   sodium bicarbonate 8.4 % (1 mEq/mL) injection (50 mEq Intravenous Given 3/7/22 1632)   calcium chloride 100 mg/mL (10 %) injection (1 g Intravenous Given 3/7/22 1636)   amiodarone injection (300 mg Intravenous Given 3/7/22 1638)       MDM, Impression and Plan   Previous Records:  I decided to obtain old medical records which showed:   Patient had a echo on 09/04/2020 which showed:  · Concentric left ventricular hypertrophy.  · Severe left atrial enlargement.  · Left ventricular systolic function. The estimated ejection fraction is 60%.  · Grade III (severe) left ventricular diastolic dysfunction consistent with restrictive physiology.  · Normal right ventricular systolic function.  · Mild tricuspid regurgitation.  · Mild mitral regurgitation.  · Intermediate central venous pressure (8 mmHg).  · The estimated PA systolic pressure is 48 mmHg.  · Pulmonary hypertension  present.      Initial Assessment:   Urgent evaluation of 46 y.o. male with history as above who presents the ED with chief complaint of shortness of breath and dizziness.  On arrival to the ED patient was noted to be afebrile tachycardic to 109 with a stable blood pressure 152/100 satting 97% on room air in no acute respiratory distress.  Per nursing report he was in the waiting room using the restroom.  The nurse was waiting for him to finish.  He knocked on the door stating that he was done with the restroom.  As she was rolling him out of the restroom he stated that he felt like he was going to pass out and became unresponsive with deep breathing.  Patient was rolled in the emergency department and placed in a stretcher and immediately moved to room 13. Glucose 205. He was placed on the monitor and while obtaining vitals he was noted to be bradycardic and his pupils were noted to be constricted.  During initial evaluation he was noted to be pulseless and CPR was initiated.    Differential Diagnosis:   -Hypoglycemia  -pulmonary embolus  -pneumonia  -ACS  -cardiac arrest  -heroin overdose  Independently Interpreted Test(s):   EKG:  I independently reviewed and interpreted the EKG and my findings are as follows:   Please see ED course.     Initial EKG at 2:03 p.m. shows sinus tachycardia with ventricular rate of 110 beats per minute with baseline artifact and no STEMI.  Similar previous EKG.    Repeat EKG at 420 shows sinus bradycardia with ventricular rate of 44 beats per minute and inverted T-waves in the anterior lateral leads no STEMI.    Repeat EKG at 4:52 p.m. shows asystole    IMAGING:  I have ordered and independently interpreted X-rays and my findings are as follow:  Please see ED course.  Chest x-ray shows cardiomegaly with a small left pleural effusion no pneumothorax or consolidations pointing towards a pneumonia    Clinical Tests:   Lab Tests: Ordered and Reviewed  Radiological Study: Ordered and  Reviewed  Medical Tests: Ordered and Reviewed    ED Management:    After patient was brought to room 13 he was noted to be in respiratory distress due to his history of IV drugs he was given Narcan intranasally with no response.  While he was being put on the monitor he was noted to not have a pulse.  CPR was initiated at 16:20.  Left shoulder IO performed.  LMA placed for airway.  He received multiple rounds of epi while CPR was being performed as well as calcium, bicarb, magnesium.  He was noted to be in  Ventricular tachycardia and he he was defibrillated x2.  He was given amiodarone and was noted to be and PEA.  All interventions were exhausted.  Bedside ultrasound showed cardiac standstill.  Time of death 4:44 p.m. I called and discussed the case with patient's wife and onto states that they will arrange to try to make it to the emergency department to see the patient before his body is moved.  Patient discharged as .  Please see ED course for discussion of labs.              Final diagnoses:  [R68.89] Multiple complaints  [R06.02] SOB (shortness of breath)  [I46.9] Cardiac arrest (Primary)          ED Disposition Condition    Discharge         ED Prescriptions     None        Follow-up Information    None         ED Course as of 22 1746   Mon Mar 07, 2022   1708 Spoke with patient's wife and want and made them aware that patient is .  They state that they will try to make it here in the next hour to see his body. [HM]      ED Course User Index  [HM] MD Ricki Donis MD   Emergency Resident      Ricki Howell MD  Resident  22 [phone]       Ricki Howell MD  Resident  22 [phone]

## 2022-03-08 NOTE — ED NOTES
Pt care assumed. Report received by OLGA LIDIA Price. Pt lying in stretcher in low and locked position and side rails raised x2.

## 2022-03-08 NOTE — ED NOTES
Physician spoke with family concerning patient's death.  Family is taking time to bereave in the room with patient.

## (undated) DEVICE — SUT VICRYL PLUS 3-0 SH 18IN

## (undated) DEVICE — GAUZE FLUFF XXLG 36X36 2 PLY

## (undated) DEVICE — SPONGE LAP 18X18 PREWASHED

## (undated) DEVICE — SUT 0 18IN SILK BLK BRAIDE

## (undated) DEVICE — STOCKINET TUBULAR 1 PLY 6X60IN

## (undated) DEVICE — SPONGE SUPER KERLIX 6X6.75IN

## (undated) DEVICE — TRAY BONE MARROW BEK3411

## (undated) DEVICE — SPONGE DERMACEA GAUZE 4X4

## (undated) DEVICE — STAPLER SKIN PROXIMATE WIDE

## (undated) DEVICE — TOWEL OR DISP STRL BLUE 4/PK

## (undated) DEVICE — KIT EVACUATOR 3-SPRING 1/8 DRN

## (undated) DEVICE — DRAPE PLASTIC U 60X72

## (undated) DEVICE — TRAY MINOR ORTHO

## (undated) DEVICE — SUT BONE WAX 2.5 GRMS 12/BX

## (undated) DEVICE — SEE MEDLINE ITEM 146298

## (undated) DEVICE — KIT IRR SUCTION HND PIECE

## (undated) DEVICE — GAUZE SPONGE 4'X4 12 PLY

## (undated) DEVICE — SEE MEDLINE ITEM 146322

## (undated) DEVICE — PAD ABD 8X10 STERILE

## (undated) DEVICE — BLADE SURG CARBON STEEL #10

## (undated) DEVICE — STOCKINET 4INX48

## (undated) DEVICE — IMMOB KNEE UNIV TRI PANEL 19IN

## (undated) DEVICE — COVER LIGHT HANDLE 80/CA

## (undated) DEVICE — GAUZE SPONGE 4X4 12PLY

## (undated) DEVICE — TRAY MINOR GEN SURG

## (undated) DEVICE — SPONGE GAUZE 16PLY 4X4

## (undated) DEVICE — SEE ITEM #93478

## (undated) DEVICE — DRESSING GAUZE XEROFORM 5X9

## (undated) DEVICE — SUT VICRYL CT-1 2-0 27IN

## (undated) DEVICE — SUT 3-0 12-18IN SILK

## (undated) DEVICE — BANDAGE ELAS SOFTWRAP ST 6X5YD

## (undated) DEVICE — DRESSING DERMACEA SPNG 10S

## (undated) DEVICE — ELECTRODE REM PLYHSV RETURN 9

## (undated) DEVICE — SYR IRRIGATION BULB STER 60ML

## (undated) DEVICE — BANDAGE COMPR 6IN 5.8YD

## (undated) DEVICE — BANDAGE MATRIX HK LOOP 6IN 5YD

## (undated) DEVICE — SUT ETHILON 2-0 BLK PS-2

## (undated) DEVICE — SUT PROLENE 2-0 SH 36IN BLU

## (undated) DEVICE — PADDING CAST SOFT-ROLL 4 X 4

## (undated) DEVICE — SEE MEDLINE ITEM 157131